# Patient Record
Sex: MALE | Race: WHITE | NOT HISPANIC OR LATINO | Employment: OTHER | URBAN - METROPOLITAN AREA
[De-identification: names, ages, dates, MRNs, and addresses within clinical notes are randomized per-mention and may not be internally consistent; named-entity substitution may affect disease eponyms.]

---

## 2017-01-04 ENCOUNTER — OFFICE VISIT (OUTPATIENT)
Dept: OTHER | Facility: IMAGING CENTER | Age: 77
End: 2017-01-04
Payer: MEDICARE

## 2017-01-04 DIAGNOSIS — M19.032 PRIMARY OSTEOARTHRITIS OF BOTH WRISTS: ICD-10-CM

## 2017-01-04 DIAGNOSIS — M54.2 CERVICALGIA: ICD-10-CM

## 2017-01-04 DIAGNOSIS — M19.031 PRIMARY OSTEOARTHRITIS OF BOTH WRISTS: ICD-10-CM

## 2017-01-04 DIAGNOSIS — G60.9 PERIPHERAL NEUROPATHY, IDIOPATHIC: Primary | ICD-10-CM

## 2017-01-04 PROCEDURE — 99213 OFFICE O/P EST LOW 20 MIN: CPT | Mod: 25 | Performed by: FAMILY MEDICINE

## 2017-01-04 PROCEDURE — 97813 ACUP 1/> W/ESTIM 1ST 15 MIN: CPT | Performed by: FAMILY MEDICINE

## 2017-01-04 PROCEDURE — 97811 ACUP 1/> W/O ESTIM EA ADD 15: CPT | Performed by: FAMILY MEDICINE

## 2017-01-04 NOTE — PROGRESS NOTES
[facility] Medical Acupuncture Progress Note  [address] [place] [place] [address]  Dept: [phone]      Patient Name: Primo Ca   MRN: [id number]  YOB: 1940  PCP: Jefferson Garcia M.D.  Date of Service: 1/4/2017  9:59 AM    CC Alvarado - SANDY  peripheral neuropathy, OA wrists bilaterally (lateral aspect), cervicalgia     HPI He needs 5 teeth recrowned - he's going to [place] at the end of this month.    Still has neuropathy to the feet.  Last treatment was helpful for his feet.  He states that the neuropathy hasn't gotten worse in a year.  The wrists aren't too bad at this point.  He has a bit of pain to the R lateral wrist.      Neck is tight bilaterally.       ROS walks an hour 4x per week-   Seen by chiropractic in the past - states that this didn't help.   Favorite color - Red - Dark.  Just likes it. Wears brown and light colored clothes.    Favorite Season - likes the color of the fall .  Too hot in the summer.  90s is too hot.    For hobby / fun - travel.  In the states and overseas.  Likes to meet people, but really likes the history. Genealogy.   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    Retired at 55.    Didn't like the management.    4 years with the [employer].    Born in [place], [place] ? Early in the 2 AM.  Unknown.   R handed   PMH Past Medical History   Diagnosis Date   • Hypertension    • Dyslipidemia    • Gout    • Vertigo    • Obesity    • Diabetes    • GERD (gastroesophageal reflux disease)      Past Surgical History   Procedure Laterality Date   • Cholecystectomy         Social History     Social History   • Marital Status:      Spouse Name: Evelyn   • Number of Children: N/A   • Years of Education: N/A     Social History Main Topics   • Smoking status: Former Smoker -- 22 years     Types: Pipe     Start date: 04/01/1975     Quit date: 08/02/1981   • Smokeless tobacco: Never Used   • Alcohol Use: No   • Drug Use: No   • Sexual  Activity: No      Comment: , 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     • ONE TOUCH ULTRA TEST strip TEST ONCE DAILY AND AS NEEDED FOR SYMPTOMS OF HIGH OR LOW SUGAR 100 Strip 3   • hydrochlorothiazide (HYDRODIURIL) 25 MG Tab TAKE 1 TABLET BY MOUTH EVERY DAY 90 Tab 3   • lisinopril (PRINIVIL) 20 MG Tab TAKE 1 TABLET BY MOUTH TWICE DAILY 180 Tab 3   • POTASSIUM CHLORIDE PO Take  by mouth.     • allopurinol (ZYLOPRIM) 300 MG TABS Take 1 Tab by mouth every day.     • Alpha-Lipoic Acid 600 MG CAPS Take 1 Cap by mouth every day.     • Blood Glucose Monitoring Suppl SUPPLIES MISC Test strips order: Test strips for One Touch Ultra meter. Sig: use once daily and prn ssx high or low sugar 100 Each 3   • cyanocobalamin (VITAMIN B-12) 500 MCG TABS Take 500 mcg by mouth every day.     • docosahexanoic acid (FISH OIL) 1000 MG CAPS Take 1,000 mg by mouth 3 times a day, with meals.     • vitamin D (CHOLECALCIFEROL) 1000 UNIT TABS Take 1,000 Units by mouth 3 times a day.       No current facility-administered medications on file prior to visit.      ALLERGIES Allergies   Allergen Reactions   • Spironolactone      diarrhea      PE Pulses - not examined today  Tongue - not examined today     Assesment Eastern Stagnation SI, Yin Qi  BaZi Saavedra Water (Ángel), strength indeterminate (strong per preferences) need time of birth    Western Encounter Diagnoses   Name Primary?   •  Peripheral neuropathy, idiopathic Yes   • Primary osteoarthritis of both wrists    • Cervicalgia                   Plan Treatment matrix = ZULEIKA CRAIG  Set 1: BLE SP6-->++SP3 / LR5-->++LR interspace / KI8-->++KI1 (L KI no estim)  Set 2: Bilateral FMS  Set 3: SANDY Quinn  Set 4: RUE SI7-->SI5  Set 5: RLE Yin ankle x 4   15 min spent face to face, not including procedure  Kal Huizar M.D.

## 2017-01-04 NOTE — MR AVS SNAPSHOT
Primo Gambino Lanny   2017 10:00 AM   Office Visit   MRN: [id number]    Department:  Acupuncture Med   Dept Phone:  [phone]    Description:  Male : 1940   Provider:  Kal Huizar M.D.           Allergies as of 2017     Allergen Noted Reactions    Spironolactone 2016       diarrhea      You were diagnosed with     Peripheral neuropathy, idiopathic   [241263]  -  Primary     Primary osteoarthritis of both wrists   [8057977]       Cervicalgia   [723.1.ICD-9-CM]         Vital Signs     Smoking Status                   Former Smoker           Basic Information     Date Of Birth Sex Race Ethnicity Preferred Language    1940 Male White Non- English      Problem List              ICD-10-CM Priority Class Noted - Resolved    Type 2 diabetes, controlled, with neuropathy (HCC) E11.40   10/19/2009 - Present    HTN (hypertension) I10   10/19/2009 - Present    Dyslipidemia E78.5   10/19/2009 - Present    Chronic gout M1A.9XX0   10/19/2009 - Present    GERD (gastroesophageal reflux disease) K21.9   10/19/2009 - Present    Obesity (BMI 35.0-39.9 without comorbidity) (HCC) (Chronic) E66.9   Unknown - Present    Leg pain M79.606   2013 - Present    Seasonal allergies J30.2   2/10/2014 - Present    Cerebral microvascular disease I67.9   3/4/2014 - Present    Diabetic neuropathy, type II diabetes mellitus (HCC) E11.40   3/20/2014 - Present    Varicose veins of legs I83.93   3/20/2014 - Present    Actinic keratosis L57.0   3/20/2014 - Present    History of squamous cell carcinoma of skin Z85.828   3/20/2014 - Present    Diverticulosis of colon K57.30   2014 - Present    Neck pain on left side M54.2   10/30/2014 - Present    BPH (benign prostatic hyperplasia) N40.0   2015 - Present    ALEX on CPAP G47.33   2015 - Present    Cervical radiculopathy M54.12   10/8/2015 - Present    Peripheral neuropathy, idiopathic G60.9   10/8/2015 - Present    Primary osteoarthritis of both  wrists M19.031, M19.032   3/1/2016 - Present    Absolute anemia D64.9   3/15/2016 - Present    Obesity (BMI 30-39.9) E66.9   9/22/2016 - Present      Health Maintenance        Date Due Completion Dates    IMM DTaP/Tdap/Td Vaccine (1 - Tdap) 8/27/1959 ---    RETINAL SCREENING 4/22/2016 4/22/2015, 11/24/2014    IMM INFLUENZA (1) 9/1/2016 10/19/2009, 10/25/2007    A1C SCREENING 3/16/2017 9/16/2016, 2/29/2016, 10/19/2015, 3/17/2015, 10/23/2014, 2/3/2014, 11/4/2013, 5/24/2013, 10/15/2012, 4/5/2012, 11/14/2011    FASTING LIPID PROFILE 9/16/2017 9/16/2016, 2/29/2016, 10/19/2015, 3/17/2015, 10/23/2014, 3/5/2014, 11/4/2013, 10/15/2012, 4/5/2012, 2/11/2011, 10/12/2009    URINE ACR / MICROALBUMIN 9/16/2017 9/16/2016, 2/29/2016, 10/19/2015, 3/17/2015, 10/23/2014, 2/3/2014, 4/5/2012    SERUM CREATININE 9/16/2017 9/16/2016, 5/9/2016, 4/11/2016, 2/29/2016, 11/26/2015, 10/19/2015, 4/28/2015, 4/26/2015, 3/17/2015, 10/23/2014, 3/4/2014, 2/3/2014, 1/15/2013, 12/9/2011, 2/11/2011, 10/12/2009    IMM PNEUMOCOCCAL 65+ (ADULT) LOW/MEDIUM RISK SERIES (2 of 2 - PPSV23) 9/22/2017 9/22/2016    DIABETES MONOFILAMTENT / LE EXAM 9/22/2017 9/22/2016, 4/2/2015, 3/20/2014    COLONOSCOPY 8/19/2024 8/19/2014            Current Immunizations     13-VALENT PCV PREVNAR 9/22/2016    INFLUENZA VACCINE H1N1 10/1/2013    Influenza TIV (IM) 10/19/2009    Influenza Vaccine Pediatric 10/25/2007    Pneumococcal Vaccine (UF)Historical Data 1/1/2010    SHINGLES VACCINE 10/19/2009      Below and/or attached are the medications your provider expects you to take. Review all of your home medications and newly ordered medications with your provider and/or pharmacist. Follow medication instructions as directed by your provider and/or pharmacist. Please keep your medication list with you and share with your provider. Update the information when medications are discontinued, doses are changed, or new medications (including over-the-counter products) are added; and carry  medication information at all times in the event of emergency situations     Allergies:  SPIRONOLACTONE - (reactions not documented)               Medications  Valid as of: January 04, 2017 - 11:08 AM    Generic Name Brand Name Tablet Size Instructions for use    Allopurinol (Tab) ZYLOPRIM 300 MG Take 1 Tab by mouth every day.        Alpha-Lipoic Acid (Cap) Alpha-Lipoic Acid 600 MG Take 1 Cap by mouth every day.        Atenolol (Tab) TENORMIN 50 MG TAKE 1 TABLET BY MOUTH TWICE DAILY        Blood Glucose Monitoring Suppl (Misc) Blood Glucose Monitoring Suppl SUPPLIES Test strips order: Test strips for One Touch Ultra meter. Sig: use once daily and prn ssx high or low sugar        Cholecalciferol (Tab) cholecalciferol 1000 UNIT Take 1,000 Units by mouth 3 times a day.        Cyanocobalamin (Tab) VITAMIN B-12 500 MCG Take 500 mcg by mouth every day.        DiltiaZEM HCl Coated Beads (CAPSULE SR 24 HR) CARTIA  MG TAKE ONE CAPSULE BY MOUTH EVERY DAY        Glucose Blood (Strip) ONE TOUCH ULTRA TEST  TEST ONCE DAILY AND AS NEEDED FOR SYMPTOMS OF HIGH OR LOW SUGAR        HydroCHLOROthiazide (Tab) HYDRODIURIL 25 MG TAKE 1 TABLET BY MOUTH EVERY DAY        Indomethacin (Cap) INDOCIN 50 MG Take 50 mg by mouth 3 times a day.        Lisinopril (Tab) PRINIVIL 20 MG TAKE 1 TABLET BY MOUTH TWICE DAILY        Lovastatin (Tab) MEVACOR 20 MG TAKE 3 TABLETS BY MOUTH EVERY DAY        Omega-3 Fatty Acids (Cap) OMEGA 3 FA 1000 MG Take 1,000 mg by mouth 3 times a day, with meals.        Potassium Chloride   Take  by mouth.        SITagliptin-MetFORMIN HCl (Tab) JANUMET  MG TAKE 1 TABLET TWICE DAILY  WITH MEALS        .                 Medicines prescribed today were sent to:     [id number] - [place], [place] - [address] AT [facility] [address] [facility]    [address] [address]    Phone: [phone] Fax: [phone]    Open 24 Hours?: No    [facility] -  [facility] [place] - [address] AT [address]AL TO [address] [facility]    [address] [facility] [address]    Phone: [phone] Fax: [phone]    Open 24 Hours?: No    [id number] - [place], [place] - [address] AT [facility] [address]    [address] [facility] [address]    Phone: [phone] Fax: [phone]    Open 24 Hours?: No      Medication refill instructions:       If your prescription bottle indicates you have medication refills left, it is not necessary to call your provider’s office. Please contact your pharmacy and they will refill your medication.    If your prescription bottle indicates you do not have any refills left, you may request refills at any time through one of the following ways: The online [employer] system (except Urgent Care), by calling your provider’s office, or by asking your pharmacy to contact your provider’s office with a refill request. Medication refills are processed only during regular business hours and may not be available until the next business day. Your provider may request additional information or to have a follow-up visit with you prior to refilling your medication.   *Please Note: Medication refills are assigned a new Rx number when refilled electronically. Your pharmacy may indicate that no refills were authorized even though a new prescription for the same medication is available at the pharmacy. Please request the medicine by name with the pharmacy before contacting your provider for a refill.        Instructions    The side effects of Acupuncture needle insertion include: minor bruising, bleeding, or pain at the site of needle insertion.  If more worrisome symptoms, such as continued bleeding, severe bruising, or continue pain or altered sensation persist, please contact [facility] office @ [phone]            [employer] Access Code: Activation code not generated  Current [employer] Status: Active

## 2017-01-04 NOTE — PATIENT INSTRUCTIONS
The side effects of Acupuncture needle insertion include: minor bruising, bleeding, or pain at the site of needle insertion.  If more worrisome symptoms, such as continued bleeding, severe bruising, or continue pain or altered sensation persist, please contact [facility] office @ [phone]

## 2017-01-25 ENCOUNTER — OFFICE VISIT (OUTPATIENT)
Dept: OTHER | Facility: IMAGING CENTER | Age: 77
End: 2017-01-25
Payer: MEDICARE

## 2017-01-25 DIAGNOSIS — G60.9 PERIPHERAL NEUROPATHY, IDIOPATHIC: ICD-10-CM

## 2017-01-25 DIAGNOSIS — M19.032 PRIMARY OSTEOARTHRITIS OF BOTH WRISTS: ICD-10-CM

## 2017-01-25 DIAGNOSIS — M54.12 CERVICAL RADICULOPATHY: Primary | ICD-10-CM

## 2017-01-25 DIAGNOSIS — M19.031 PRIMARY OSTEOARTHRITIS OF BOTH WRISTS: ICD-10-CM

## 2017-01-25 PROCEDURE — 99213 OFFICE O/P EST LOW 20 MIN: CPT | Mod: 25 | Performed by: FAMILY MEDICINE

## 2017-01-25 PROCEDURE — 1101F PT FALLS ASSESS-DOCD LE1/YR: CPT | Performed by: FAMILY MEDICINE

## 2017-01-25 PROCEDURE — G8432 DEP SCR NOT DOC, RNG: HCPCS | Performed by: FAMILY MEDICINE

## 2017-01-25 PROCEDURE — 97811 ACUP 1/> W/O ESTIM EA ADD 15: CPT | Performed by: FAMILY MEDICINE

## 2017-01-25 PROCEDURE — G8484 FLU IMMUNIZE NO ADMIN: HCPCS | Performed by: FAMILY MEDICINE

## 2017-01-25 PROCEDURE — 1036F TOBACCO NON-USER: CPT | Performed by: FAMILY MEDICINE

## 2017-01-25 PROCEDURE — G8419 CALC BMI OUT NRM PARAM NOF/U: HCPCS | Performed by: FAMILY MEDICINE

## 2017-01-25 PROCEDURE — 4040F PNEUMOC VAC/ADMIN/RCVD: CPT | Performed by: FAMILY MEDICINE

## 2017-01-25 PROCEDURE — 97810 ACUP 1/> WO ESTIM 1ST 15 MIN: CPT | Performed by: FAMILY MEDICINE

## 2017-01-25 NOTE — PROGRESS NOTES
[facility] Medical Acupuncture Progress Note  [address] [place] [address]  Dept: [phone]      Patient Name: Primo Ca   MRN: [id number]  YOB: 1940  PCP: Jefferson Garcia M.D.  Date of Service: 1/25/2017 10:07 AM    CC Alvarado - SANDY  peripheral neuropathy, OA wrists bilaterally (lateral aspect), cervicalgia     HPI He has tingling to the top of the L shoulder.  He states that it's a bit annoying.  Hot shower helps to relieve it. He's a bit tight in the shoulders as well.         R wrist is bothering him.  He's been shoveling snow.      SANDY peripheral neuropathy.    He's ready to go to [place] to get his teeth worked on   [employer] walks an hour 4x per week-   Seen by chiropractic in the past - states that this didn't help.   Favorite color - Red - Dark.  Just likes it. Wears brown and light colored clothes.    Favorite Season - likes the color of the fall .  Too hot in the summer.  90s is too hot.    For hobby / fun - travel.  In the states and overseas.  Likes to meet people, but really likes the history. Genealogy.   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    Retired at 55.    Didn't like the management.    4 years with the [employer].    Used to work in [place], [place]  Born in [place], [place] ? Early in the 2 AM.  Unknown.   R handed   PMH Past Medical History   Diagnosis Date   • Hypertension    • Dyslipidemia    • Gout    • Vertigo    • Obesity    • Diabetes    • GERD (gastroesophageal reflux disease)      Past Surgical History   Procedure Laterality Date   • Cholecystectomy         Social History     Social History   • Marital Status:      Spouse Name: Evelyn   • Number of Children: N/A   • Years of Education: N/A     Social History Main Topics   • Smoking status: Former Smoker -- 22 years     Types: Pipe     Start date: 04/01/1975     Quit date: 08/02/1981   • Smokeless tobacco: Never Used   • Alcohol Use: No   • Drug Use: No   • Sexual  Activity: No      Comment: , 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     • ONE TOUCH ULTRA TEST strip TEST ONCE DAILY AND AS NEEDED FOR SYMPTOMS OF HIGH OR LOW SUGAR 100 Strip 3   • hydrochlorothiazide (HYDRODIURIL) 25 MG Tab TAKE 1 TABLET BY MOUTH EVERY DAY 90 Tab 3   • lisinopril (PRINIVIL) 20 MG Tab TAKE 1 TABLET BY MOUTH TWICE DAILY 180 Tab 3   • POTASSIUM CHLORIDE PO Take  by mouth.     • allopurinol (ZYLOPRIM) 300 MG TABS Take 1 Tab by mouth every day.     • Alpha-Lipoic Acid 600 MG CAPS Take 1 Cap by mouth every day.     • Blood Glucose Monitoring Suppl SUPPLIES MISC Test strips order: Test strips for One Touch Ultra meter. Sig: use once daily and prn ssx high or low sugar 100 Each 3   • cyanocobalamin (VITAMIN B-12) 500 MCG TABS Take 500 mcg by mouth every day.     • docosahexanoic acid (FISH OIL) 1000 MG CAPS Take 1,000 mg by mouth 3 times a day, with meals.     • vitamin D (CHOLECALCIFEROL) 1000 UNIT TABS Take 1,000 Units by mouth 3 times a day.       No current facility-administered medications on file prior to visit.      ALLERGIES Allergies   Allergen Reactions   • Spironolactone      diarrhea      PE Pulses - not examined today  Tongue - not examined today     Assesment Eastern Stagnation SI, Yin Qi  BaZi Saavedra Water (Ángel), strength indeterminate (strong per preferences) need time of birth    Western Encounter Diagnoses   Name Primary?   •  Cervical radiculopathy Yes   • Primary osteoarthritis of both wrists    • Peripheral neuropathy, idiopathic                   Plan Treatment matrix = Alternating  Set 1: neuropathy treatment - RLE Yin   Set 2: Bilateral FMS  Set 3: SANDY Ruy Quinn  Set 4: LLE Saavedra ankle x 4, GB20,21  Set 5: RLE Yin ankle x 4   15 min spent face to face, not including procedure  Kal Huizar M.D.

## 2017-01-25 NOTE — MR AVS SNAPSHOT
Primo Gambino Lanny   2017 10:00 AM   Office Visit   MRN: [id number]    Department:  Acupuncture Med   Dept Phone:  [phone]    Description:  Male : 1940   Provider:  Kal Huizar M.D.           Allergies as of 2017     Allergen Noted Reactions    Spironolactone 2016       diarrhea      You were diagnosed with     Cervical radiculopathy   [657445]  -  Primary     Primary osteoarthritis of both wrists   [7151389]       Peripheral neuropathy, idiopathic   [746013]         Vital Signs     Smoking Status                   Former Smoker           Basic Information     Date Of Birth Sex Race Ethnicity Preferred Language    1940 Male White Non- English      Your appointments     2017 10:00 AM   ACUPUNCTURE 30 with Kal Huizar M.D.   [facility] Acupuncture and Integrative Medicine (--)    [address] [facility] [id number] [phone]              Problem List              ICD-10-CM Priority Class Noted - Resolved    Type 2 diabetes, controlled, with neuropathy (CMS-[facility]) E11.40   10/19/2009 - Present    HTN (hypertension) I10   10/19/2009 - Present    Dyslipidemia E78.5   10/19/2009 - Present    Chronic gout M1A.9XX0   10/19/2009 - Present    GERD (gastroesophageal reflux disease) K21.9   10/19/2009 - Present    Obesity (BMI 35.0-39.9 without comorbidity) ([facility]) (Chronic) E66.9   Unknown - Present    Leg pain M79.606   2013 - Present    Seasonal allergies J30.2   2/10/2014 - Present    Cerebral microvascular disease I67.9   3/4/2014 - Present    Diabetic neuropathy, type II diabetes mellitus (CMS-[facility]) E11.40   3/20/2014 - Present    Varicose veins of legs I83.93   3/20/2014 - Present    Actinic keratosis L57.0   3/20/2014 - Present    History of squamous cell carcinoma of skin Z85.828   3/20/2014 - Present    Diverticulosis of colon K57.30   2014 - Present    Neck pain on left side M54.2   10/30/2014 - Present    BPH (benign prostatic  hyperplasia) N40.0   4/2/2015 - Present    ALEX on CPAP G47.33   4/29/2015 - Present    Cervical radiculopathy M54.12   10/8/2015 - Present    Peripheral neuropathy, idiopathic G60.9   10/8/2015 - Present    Primary osteoarthritis of both wrists M19.031, M19.032   3/1/2016 - Present    Absolute anemia D64.9   3/15/2016 - Present    Obesity (BMI 30-39.9) E66.9   9/22/2016 - Present      Health Maintenance        Date Due Completion Dates    IMM DTaP/Tdap/Td Vaccine (1 - Tdap) 8/27/1959 ---    RETINAL SCREENING 4/22/2016 4/22/2015, 11/24/2014    IMM INFLUENZA (1) 9/1/2016 10/19/2009, 10/25/2007    A1C SCREENING 3/16/2017 9/16/2016, 2/29/2016, 10/19/2015, 3/17/2015, 10/23/2014, 2/3/2014, 11/4/2013, 5/24/2013, 10/15/2012, 4/5/2012, 11/14/2011    FASTING LIPID PROFILE 9/16/2017 9/16/2016, 2/29/2016, 10/19/2015, 3/17/2015, 10/23/2014, 3/5/2014, 11/4/2013, 10/15/2012, 4/5/2012, 2/11/2011, 10/12/2009    URINE ACR / MICROALBUMIN 9/16/2017 9/16/2016, 2/29/2016, 10/19/2015, 3/17/2015, 10/23/2014, 2/3/2014, 4/5/2012    SERUM CREATININE 9/16/2017 9/16/2016, 5/9/2016, 4/11/2016, 2/29/2016, 11/26/2015, 10/19/2015, 4/28/2015, 4/26/2015, 3/17/2015, 10/23/2014, 3/4/2014, 2/3/2014, 1/15/2013, 12/9/2011, 2/11/2011, 10/12/2009    IMM PNEUMOCOCCAL 65+ (ADULT) LOW/MEDIUM RISK SERIES (2 of 2 - PPSV23) 9/22/2017 9/22/2016    DIABETES MONOFILAMENT / LE EXAM 9/22/2017 9/22/2016, 4/2/2015, 3/20/2014    COLONOSCOPY 8/19/2024 8/19/2014            Current Immunizations     13-VALENT PCV PREVNAR 9/22/2016    INFLUENZA VACCINE H1N1 10/1/2013    Influenza TIV (IM) 10/19/2009    Influenza Vaccine Pediatric 10/25/2007    Pneumococcal Vaccine (UF)Historical Data 1/1/2010    SHINGLES VACCINE 10/19/2009      Below and/or attached are the medications your provider expects you to take. Review all of your home medications and newly ordered medications with your provider and/or pharmacist. Follow medication instructions as directed by your provider and/or  pharmacist. Please keep your medication list with you and share with your provider. Update the information when medications are discontinued, doses are changed, or new medications (including over-the-counter products) are added; and carry medication information at all times in the event of emergency situations     Allergies:  SPIRONOLACTONE - (reactions not documented)               Medications  Valid as of: January 25, 2017 - 11:16 AM    Generic Name Brand Name Tablet Size Instructions for use    Allopurinol (Tab) ZYLOPRIM 300 MG Take 1 Tab by mouth every day.        Alpha-Lipoic Acid (Cap) Alpha-Lipoic Acid 600 MG Take 1 Cap by mouth every day.        Atenolol (Tab) TENORMIN 50 MG TAKE 1 TABLET BY MOUTH TWICE DAILY        Blood Glucose Monitoring Suppl (Misc) Blood Glucose Monitoring Suppl SUPPLIES Test strips order: Test strips for One Touch Ultra meter. Sig: use once daily and prn ssx high or low sugar        Cholecalciferol (Tab) cholecalciferol 1000 UNIT Take 1,000 Units by mouth 3 times a day.        Cyanocobalamin (Tab) VITAMIN B-12 500 MCG Take 500 mcg by mouth every day.        DiltiaZEM HCl Coated Beads (CAPSULE SR 24 HR) CARTIA  MG TAKE ONE CAPSULE BY MOUTH EVERY DAY        Glucose Blood (Strip) ONE TOUCH ULTRA TEST  TEST ONCE DAILY AND AS NEEDED FOR SYMPTOMS OF HIGH OR LOW SUGAR        HydroCHLOROthiazide (Tab) HYDRODIURIL 25 MG TAKE 1 TABLET BY MOUTH EVERY DAY        Indomethacin (Cap) INDOCIN 50 MG Take 50 mg by mouth 3 times a day.        Lisinopril (Tab) PRINIVIL 20 MG TAKE 1 TABLET BY MOUTH TWICE DAILY        Lovastatin (Tab) MEVACOR 20 MG TAKE 3 TABLETS BY MOUTH EVERY DAY        Omega-3 Fatty Acids (Cap) OMEGA 3 FA 1000 MG Take 1,000 mg by mouth 3 times a day, with meals.        Potassium Chloride   Take  by mouth.        SITagliptin-MetFORMIN HCl (Tab) JANUMET  MG TAKE 1 TABLET TWICE DAILY  WITH MEALS        .                 Medicines prescribed today were sent to:     [id number] - [place], [place] - [address]  AT [facility] & [facility]    [address] [facility] [address] [place] [address]    Phone: [phone] Fax: [phone]    Open 24 Hours?: No    [facility] [place], [place] - [address] AT PORTAL TO REGISTERED [facility]    [address] [facility] [place] [address]    Phone: [phone] Fax: [phone]    Open 24 Hours?: No    [id number] - [place], [place] - [address] AT [facility] & [address]    [address] [facility] [address]    Phone: [phone] Fax: [phone]    Open 24 Hours?: No      Medication refill instructions:       If your prescription bottle indicates you have medication refills left, it is not necessary to call your provider’s office. Please contact your pharmacy and they will refill your medication.    If your prescription bottle indicates you do not have any refills left, you may request refills at any time through one of the following ways: The online [employer] system (except Urgent Care), by calling your provider’s office, or by asking your pharmacy to contact your provider’s office with a refill request. Medication refills are processed only during regular business hours and may not be available until the next business day. Your provider may request additional information or to have a follow-up visit with you prior to refilling your medication.   *Please Note: Medication refills are assigned a new Rx number when refilled electronically. Your pharmacy may indicate that no refills were authorized even though a new prescription for the same medication is available at the pharmacy. Please request the medicine by name with the pharmacy before contacting your provider for a refill.        Instructions    The side effects of Acupuncture needle insertion include: minor bruising, bleeding, or pain at the site of needle insertion.  If more worrisome symptoms, such as continued bleeding, severe bruising,  or continue pain or altered sensation persist, please contact [facility] office @ [phone]              [employer] Access Code: Activation code not generated  Current [employer] Status: Active

## 2017-01-25 NOTE — PATIENT INSTRUCTIONS
The side effects of Acupuncture needle insertion include: minor bruising, bleeding, or pain at the site of needle insertion.  If more worrisome symptoms, such as continued bleeding, severe bruising, or continue pain or altered sensation persist, please contact [facility] office @ [phone]

## 2017-02-09 RX ORDER — INDOMETHACIN 50 MG/1
CAPSULE ORAL
Qty: 90 CAP | Refills: 3 | Status: SHIPPED | OUTPATIENT
Start: 2017-02-09 | End: 2017-03-08

## 2017-02-09 NOTE — TELEPHONE ENCOUNTER
Was the patient seen in the last year in this department? Yes     Does patient have an active prescription for medications requested? No     Received Request Via: Pharmacy       Last visit:9/22/16    Last labs:9/22/16

## 2017-02-28 ENCOUNTER — OFFICE VISIT (OUTPATIENT)
Dept: OTHER | Facility: IMAGING CENTER | Age: 77
End: 2017-02-28
Payer: MEDICARE

## 2017-02-28 DIAGNOSIS — M19.031 PRIMARY OSTEOARTHRITIS OF BOTH WRISTS: ICD-10-CM

## 2017-02-28 DIAGNOSIS — M54.12 CERVICAL RADICULOPATHY: ICD-10-CM

## 2017-02-28 DIAGNOSIS — M19.032 PRIMARY OSTEOARTHRITIS OF BOTH WRISTS: ICD-10-CM

## 2017-02-28 DIAGNOSIS — G60.9 PERIPHERAL NEUROPATHY, IDIOPATHIC: Primary | ICD-10-CM

## 2017-02-28 PROCEDURE — 1036F TOBACCO NON-USER: CPT | Performed by: FAMILY MEDICINE

## 2017-02-28 PROCEDURE — G8432 DEP SCR NOT DOC, RNG: HCPCS | Performed by: FAMILY MEDICINE

## 2017-02-28 PROCEDURE — 97810 ACUP 1/> WO ESTIM 1ST 15 MIN: CPT | Performed by: FAMILY MEDICINE

## 2017-02-28 PROCEDURE — 99213 OFFICE O/P EST LOW 20 MIN: CPT | Mod: 25 | Performed by: FAMILY MEDICINE

## 2017-02-28 PROCEDURE — G8484 FLU IMMUNIZE NO ADMIN: HCPCS | Performed by: FAMILY MEDICINE

## 2017-02-28 PROCEDURE — 1101F PT FALLS ASSESS-DOCD LE1/YR: CPT | Performed by: FAMILY MEDICINE

## 2017-02-28 PROCEDURE — 97811 ACUP 1/> W/O ESTIM EA ADD 15: CPT | Performed by: FAMILY MEDICINE

## 2017-02-28 PROCEDURE — G8419 CALC BMI OUT NRM PARAM NOF/U: HCPCS | Performed by: FAMILY MEDICINE

## 2017-02-28 PROCEDURE — 4040F PNEUMOC VAC/ADMIN/RCVD: CPT | Performed by: FAMILY MEDICINE

## 2017-02-28 NOTE — PATIENT INSTRUCTIONS
The side effects of Acupuncture needle insertion include: minor bruising, bleeding, or pain at the site of needle insertion.  If more worrisome symptoms, such as continued bleeding, severe bruising, or continue pain or altered sensation persist, please contact [facility] office @ [phone]

## 2017-02-28 NOTE — PROGRESS NOTES
[facility] Progress Note  [address] [place] [address]  Dept: [phone]      Patient Name: Primo Ca   MRN: [id number]  YOB: 1940  PCP: Jefferson Garcia M.D.  Date of Service: 2/28/2017 10:05 AM    CC Alvarado - SANDY  peripheral neuropathy, OA wrists bilaterally (lateral aspect), shoulder pain L      HPI He's back from [place] after having his teeth worked on.  He states that the experience was wonderful.  He saved several thousand dollars.  Over 3 days, he had 3 root canals and multiple crowns.   Evelyn got her teeth cleaned.      L anterior shoulder is tingling.  No pain to the posterior neck and thoracic spine.  Neuropathy to feet.    Wrists are painful as usual.     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    ROS walks an hour 4x per week-   Seen by chiropractic in the past - states that this didn't help.   Favorite color - Red - Dark.  Just likes it. Wears brown and light colored clothes.    Favorite Season - likes the color of the fall .  Too hot in the summer.  90s is too hot.    For hobby / fun - travel.  In the states and overseas.  Likes to meet people, but really likes the history. Genealogy.   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    Retired at 55.    Didn't like the management.    4 years with the [employer].    Used to work in [place], [place]  Born in [place], [place] ? Early in the 2 AM.  Unknown.   R handed   PMH Past Medical History   Diagnosis Date   • Hypertension    • Dyslipidemia    • Gout    • Vertigo    • Obesity    • Diabetes    • GERD (gastroesophageal reflux disease)      Past Surgical History   Procedure Laterality Date   • Cholecystectomy        SH Social History     Social History   • Marital Status:      Spouse Name: Evelyn   • Number of Children: N/A   • Years of Education: N/A     Social History Main Topics   • Smoking status: Former Smoker -- 22 years     Types: Pipe     Start date:  04/01/1975     Quit date: 08/02/1981   • Smokeless tobacco: Never Used   • Alcohol Use: No   • Drug Use: No   • Sexual Activity: No      Comment: , 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, retired government official     Other Topics Concern   • None     Social History Narrative      MEDS Current Outpatient Prescriptions on File Prior to Visit   Medication Sig Dispense Refill   • indomethacin (INDOCIN) 50 MG Cap TAKE 1 CAPSULE BY MOUTH THREE TIMES DAILY 90 Cap 3   • JANUMET  MG per tablet TAKE 1 TABLET TWICE DAILY  WITH MEALS 180 Tab 1   • atenolol (TENORMIN) 50 MG Tab TAKE 1 TABLET BY MOUTH TWICE DAILY 180 Tab 3   • CARTIA  MG CAPSULE SR 24 HR TAKE ONE CAPSULE BY MOUTH EVERY DAY 90 Cap 3   • lovastatin (MEVACOR) 20 MG Tab TAKE 3 TABLETS BY MOUTH EVERY  Tab 3   • indomethacin (INDOCIN) 50 MG Cap Take 50 mg by mouth 3 times a day.     • ONE TOUCH ULTRA TEST strip TEST ONCE DAILY AND AS NEEDED FOR SYMPTOMS OF HIGH OR LOW SUGAR 100 Strip 3   • hydrochlorothiazide (HYDRODIURIL) 25 MG Tab TAKE 1 TABLET BY MOUTH EVERY DAY 90 Tab 3   • lisinopril (PRINIVIL) 20 MG Tab TAKE 1 TABLET BY MOUTH TWICE DAILY 180 Tab 3   • POTASSIUM CHLORIDE PO Take  by mouth.     • allopurinol (ZYLOPRIM) 300 MG TABS Take 1 Tab by mouth every day.     • Alpha-Lipoic Acid 600 MG CAPS Take 1 Cap by mouth every day.     • Blood Glucose Monitoring Suppl SUPPLIES MISC Test strips order: Test strips for One Touch Ultra meter. Sig: use once daily and prn ssx high or low sugar 100 Each 3   • cyanocobalamin (VITAMIN B-12) 500 MCG TABS Take 500 mcg by mouth every day.     • docosahexanoic acid (FISH OIL) 1000 MG CAPS Take 1,000 mg by mouth 3 times a day, with meals.     • vitamin D (CHOLECALCIFEROL) 1000 UNIT TABS Take 1,000 Units by mouth 3 times a day.       No current facility-administered medications on file prior to visit.      ALLERGIES Allergies   Allergen Reactions   • Spironolactone      diarrhea      PE Pulses - not examined today  Tongue - not  examined today     Assesment Eastern Stagnation SI, Yin Qi  BaZi [place] Water (Ángel), strength indeterminate (strong per preferences) need time of birth    Western Encounter Diagnoses   Name Primary?   • Peripheral neuropathy, idiopathic Yes   • Primary osteoarthritis of both wrists    • Cervical radiculopathy                   Plan Treatment matrix = Alternating  Set 1: neuropathy treatment - RLE Yin, No estim  Set 2: Bilateral FMS  Set 3: SANDY Quinn  Set 4: LUE LU7-->LU2, HT5-->HT7  Set 5: RUE SI7-->SI5   15 min spent face to face, not including procedure  Kal Huizar M.D.

## 2017-02-28 NOTE — MR AVS SNAPSHOT
Primo Gambino Lanny   2017 10:00 AM   Office Visit   MRN: [id number]    Department:  Acupuncture Med   Dept Phone:  [phone]    Description:  Male : 1940   Provider:  Kal Huizar M.D.           Allergies as of 2017     Allergen Noted Reactions    Spironolactone 2016       diarrhea      You were diagnosed with     Peripheral neuropathy, idiopathic   [341146]  -  Primary     Primary osteoarthritis of both wrists   [9022837]       Cervical radiculopathy   [894876]         Vital Signs     Smoking Status                   Former Smoker           Basic Information     Date Of Birth Sex Race Ethnicity Preferred Language    1940 Male White Non- English      Problem List              ICD-10-CM Priority Class Noted - Resolved    Type 2 diabetes, controlled, with neuropathy (CMS-HCC) E11.40   10/19/2009 - Present    HTN (hypertension) I10   10/19/2009 - Present    Dyslipidemia E78.5   10/19/2009 - Present    Chronic gout M1A.9XX0   10/19/2009 - Present    GERD (gastroesophageal reflux disease) K21.9   10/19/2009 - Present    Obesity (BMI 35.0-39.9 without comorbidity) ([facility]) (Chronic) E66.9   Unknown - Present    Leg pain M79.606   2013 - Present    Seasonal allergies J30.2   2/10/2014 - Present    Cerebral microvascular disease I67.9   3/4/2014 - Present    Diabetic neuropathy, type II diabetes mellitus (CMS-HCC) E11.40   3/20/2014 - Present    Varicose veins of legs I83.93   3/20/2014 - Present    Actinic keratosis L57.0   3/20/2014 - Present    History of squamous cell carcinoma of skin Z85.828   3/20/2014 - Present    Diverticulosis of colon K57.30   2014 - Present    Neck pain on left side M54.2   10/30/2014 - Present    BPH (benign prostatic hyperplasia) N40.0   2015 - Present    ALEX on CPAP G47.33   2015 - Present    Cervical radiculopathy M54.12   10/8/2015 - Present    Peripheral neuropathy, idiopathic G60.9   10/8/2015 - Present    Primary  osteoarthritis of both wrists M19.031, M19.032   3/1/2016 - Present    Absolute anemia D64.9   3/15/2016 - Present    Obesity (BMI 30-39.9) E66.9   9/22/2016 - Present      Health Maintenance        Date Due Completion Dates    IMM DTaP/Tdap/Td Vaccine (1 - Tdap) 8/27/1959 ---    RETINAL SCREENING 4/22/2016 4/22/2015, 11/24/2014    IMM INFLUENZA (1) 9/1/2016 10/19/2009, 10/25/2007    A1C SCREENING 3/16/2017 9/16/2016, 2/29/2016, 10/19/2015, 3/17/2015, 10/23/2014, 2/3/2014, 11/4/2013, 5/24/2013, 10/15/2012, 4/5/2012, 11/14/2011    FASTING LIPID PROFILE 9/16/2017 9/16/2016, 2/29/2016, 10/19/2015, 3/17/2015, 10/23/2014, 3/5/2014, 11/4/2013, 10/15/2012, 4/5/2012, 2/11/2011, 10/12/2009    URINE ACR / MICROALBUMIN 9/16/2017 9/16/2016, 2/29/2016, 10/19/2015, 3/17/2015, 10/23/2014, 2/3/2014, 4/5/2012    SERUM CREATININE 9/16/2017 9/16/2016, 5/9/2016, 4/11/2016, 2/29/2016, 11/26/2015, 10/19/2015, 4/28/2015, 4/26/2015, 3/17/2015, 10/23/2014, 3/4/2014, 2/3/2014, 1/15/2013, 12/9/2011, 2/11/2011, 10/12/2009    IMM PNEUMOCOCCAL 65+ (ADULT) LOW/MEDIUM RISK SERIES (2 of 2 - PPSV23) 9/22/2017 9/22/2016    DIABETES MONOFILAMENT / LE EXAM 9/22/2017 9/22/2016, 4/2/2015, 3/20/2014    COLONOSCOPY 8/19/2024 8/19/2014            Current Immunizations     13-VALENT PCV PREVNAR 9/22/2016    INFLUENZA VACCINE H1N1 10/1/2013    Influenza TIV (IM) 10/19/2009    Influenza Vaccine Pediatric 10/25/2007    Pneumococcal Vaccine (UF)Historical Data 1/1/2010    SHINGLES VACCINE 10/19/2009      Below and/or attached are the medications your provider expects you to take. Review all of your home medications and newly ordered medications with your provider and/or pharmacist. Follow medication instructions as directed by your provider and/or pharmacist. Please keep your medication list with you and share with your provider. Update the information when medications are discontinued, doses are changed, or new medications (including over-the-counter products)  are added; and carry medication information at all times in the event of emergency situations     Allergies:  SPIRONOLACTONE - (reactions not documented)               Medications  Valid as of: February 28, 2017 - 11:01 AM    Generic Name Brand Name Tablet Size Instructions for use    Allopurinol (Tab) ZYLOPRIM 300 MG Take 1 Tab by mouth every day.        Alpha-Lipoic Acid (Cap) Alpha-Lipoic Acid 600 MG Take 1 Cap by mouth every day.        Atenolol (Tab) TENORMIN 50 MG TAKE 1 TABLET BY MOUTH TWICE DAILY        Blood Glucose Monitoring Suppl (Misc) Blood Glucose Monitoring Suppl SUPPLIES Test strips order: Test strips for One Touch Ultra meter. Sig: use once daily and prn ssx high or low sugar        Cholecalciferol (Tab) cholecalciferol 1000 UNIT Take 1,000 Units by mouth 3 times a day.        Cyanocobalamin (Tab) VITAMIN B-12 500 MCG Take 500 mcg by mouth every day.        DiltiaZEM HCl Coated Beads (CAPSULE SR 24 HR) CARTIA  MG TAKE ONE CAPSULE BY MOUTH EVERY DAY        Glucose Blood (Strip) ONE TOUCH ULTRA TEST  TEST ONCE DAILY AND AS NEEDED FOR SYMPTOMS OF HIGH OR LOW SUGAR        HydroCHLOROthiazide (Tab) HYDRODIURIL 25 MG TAKE 1 TABLET BY MOUTH EVERY DAY        Indomethacin (Cap) INDOCIN 50 MG Take 50 mg by mouth 3 times a day.        Indomethacin (Cap) INDOCIN 50 MG TAKE 1 CAPSULE BY MOUTH THREE TIMES DAILY        Lisinopril (Tab) PRINIVIL 20 MG TAKE 1 TABLET BY MOUTH TWICE DAILY        Lovastatin (Tab) MEVACOR 20 MG TAKE 3 TABLETS BY MOUTH EVERY DAY        Omega-3 Fatty Acids (Cap) OMEGA 3 FA 1000 MG Take 1,000 mg by mouth 3 times a day, with meals.        Potassium Chloride   Take  by mouth.        SITagliptin-MetFORMIN HCl (Tab) JANUMET  MG TAKE 1 TABLET TWICE DAILY  WITH MEALS        .                 Medicines prescribed today were sent to:     [id number] - [place], [place] - [address] AT [facility] & [facility]    [address] [facility] [place] [address]     Phone: [phone] Fax: [phone]    Open 24 Hours?: No    [facility] [place] - [address] AT PORTAL TO [facility] SIT[address]    [address] [facility] [address]    Phone: [phone] Fax: [phone]    Open 24 Hours?: No    [id number] - [place], [place] - [address] AT [facility] [address]    [address] [facility] [address]    Phone: [phone] Fax: [phone]    Open 24 Hours?: No      Medication refill instructions:       If your prescription bottle indicates you have medication refills left, it is not necessary to call your provider’s office. Please contact your pharmacy and they will refill your medication.    If your prescription bottle indicates you do not have any refills left, you may request refills at any time through one of the following ways: The online [employer] system (except Urgent Care), by calling your provider’s office, or by asking your pharmacy to contact your provider’s office with a refill request. Medication refills are processed only during regular business hours and may not be available until the next business day. Your provider may request additional information or to have a follow-up visit with you prior to refilling your medication.   *Please Note: Medication refills are assigned a new Rx number when refilled electronically. Your pharmacy may indicate that no refills were authorized even though a new prescription for the same medication is available at the pharmacy. Please request the medicine by name with the pharmacy before contacting your provider for a refill.        Instructions    The side effects of Acupuncture needle insertion include: minor bruising, bleeding, or pain at the site of needle insertion.  If more worrisome symptoms, such as continued bleeding, severe bruising, or continue pain or altered sensation persist, please contact [facility] office @ [phone]            [employer]  Access Code: Activation code not generated  Current MyChart Status: Active

## 2017-03-08 ENCOUNTER — HOSPITAL ENCOUNTER (EMERGENCY)
Facility: MEDICAL CENTER | Age: 77
End: 2017-03-08
Attending: EMERGENCY MEDICINE
Payer: MEDICARE

## 2017-03-08 VITALS
SYSTOLIC BLOOD PRESSURE: 137 MMHG | RESPIRATION RATE: 16 BRPM | OXYGEN SATURATION: 97 % | WEIGHT: 244.27 LBS | HEIGHT: 70 IN | DIASTOLIC BLOOD PRESSURE: 77 MMHG | BODY MASS INDEX: 34.97 KG/M2 | TEMPERATURE: 98 F | HEART RATE: 72 BPM

## 2017-03-08 DIAGNOSIS — I10 ESSENTIAL HYPERTENSION: ICD-10-CM

## 2017-03-08 DIAGNOSIS — Z91.09 ENVIRONMENTAL ALLERGIES: ICD-10-CM

## 2017-03-08 LAB
ALBUMIN SERPL BCP-MCNC: 4.2 G/DL (ref 3.2–4.9)
ALBUMIN/GLOB SERPL: 1.2 G/DL
ALP SERPL-CCNC: 70 U/L (ref 30–99)
ALT SERPL-CCNC: 17 U/L (ref 2–50)
ANION GAP SERPL CALC-SCNC: 11 MMOL/L (ref 0–11.9)
APTT PPP: 30.8 SEC (ref 24.7–36)
AST SERPL-CCNC: 23 U/L (ref 12–45)
BASOPHILS # BLD AUTO: 0.9 % (ref 0–1.8)
BASOPHILS # BLD: 0.07 K/UL (ref 0–0.12)
BILIRUB SERPL-MCNC: 0.6 MG/DL (ref 0.1–1.5)
BNP SERPL-MCNC: 99 PG/ML (ref 0–100)
BUN SERPL-MCNC: 16 MG/DL (ref 8–22)
CALCIUM SERPL-MCNC: 9.5 MG/DL (ref 8.4–10.2)
CHLORIDE SERPL-SCNC: 96 MMOL/L (ref 96–112)
CO2 SERPL-SCNC: 27 MMOL/L (ref 20–33)
CREAT SERPL-MCNC: 0.97 MG/DL (ref 0.5–1.4)
EOSINOPHIL # BLD AUTO: 0.12 K/UL (ref 0–0.51)
EOSINOPHIL NFR BLD: 1.5 % (ref 0–6.9)
ERYTHROCYTE [DISTWIDTH] IN BLOOD BY AUTOMATED COUNT: 44.4 FL (ref 35.9–50)
GFR SERPL CREATININE-BSD FRML MDRD: >60 ML/MIN/1.73 M 2
GLOBULIN SER CALC-MCNC: 3.5 G/DL (ref 1.9–3.5)
GLUCOSE SERPL-MCNC: 136 MG/DL (ref 65–99)
HCT VFR BLD AUTO: 40.7 % (ref 42–52)
HGB BLD-MCNC: 14.3 G/DL (ref 14–18)
IMM GRANULOCYTES # BLD AUTO: 0.04 K/UL (ref 0–0.11)
IMM GRANULOCYTES NFR BLD AUTO: 0.5 % (ref 0–0.9)
INR PPP: 0.92 (ref 0.87–1.13)
LIPASE SERPL-CCNC: 24 U/L (ref 7–58)
LYMPHOCYTES # BLD AUTO: 1.78 K/UL (ref 1–4.8)
LYMPHOCYTES NFR BLD: 22.6 % (ref 22–41)
MCH RBC QN AUTO: 30.8 PG (ref 27–33)
MCHC RBC AUTO-ENTMCNC: 35.1 G/DL (ref 33.7–35.3)
MCV RBC AUTO: 87.7 FL (ref 81.4–97.8)
MONOCYTES # BLD AUTO: 0.69 K/UL (ref 0–0.85)
MONOCYTES NFR BLD AUTO: 8.7 % (ref 0–13.4)
NEUTROPHILS # BLD AUTO: 5.19 K/UL (ref 1.82–7.42)
NEUTROPHILS NFR BLD: 65.8 % (ref 44–72)
NRBC # BLD AUTO: 0 K/UL
NRBC BLD AUTO-RTO: 0 /100 WBC
PLATELET # BLD AUTO: 313 K/UL (ref 164–446)
PMV BLD AUTO: 8.7 FL (ref 9–12.9)
POTASSIUM SERPL-SCNC: 3.8 MMOL/L (ref 3.6–5.5)
PROT SERPL-MCNC: 7.7 G/DL (ref 6–8.2)
PROTHROMBIN TIME: 12.2 SEC (ref 12–14.6)
RBC # BLD AUTO: 4.64 M/UL (ref 4.7–6.1)
SODIUM SERPL-SCNC: 134 MMOL/L (ref 135–145)
TROPONIN I SERPL-MCNC: <0.02 NG/ML (ref 0–0.04)
WBC # BLD AUTO: 7.9 K/UL (ref 4.8–10.8)

## 2017-03-08 PROCEDURE — 99284 EMERGENCY DEPT VISIT MOD MDM: CPT

## 2017-03-08 PROCEDURE — 93005 ELECTROCARDIOGRAM TRACING: CPT | Performed by: EMERGENCY MEDICINE

## 2017-03-08 PROCEDURE — 96374 THER/PROPH/DIAG INJ IV PUSH: CPT

## 2017-03-08 PROCEDURE — 85025 COMPLETE CBC W/AUTO DIFF WBC: CPT

## 2017-03-08 PROCEDURE — 36415 COLL VENOUS BLD VENIPUNCTURE: CPT

## 2017-03-08 PROCEDURE — 83880 ASSAY OF NATRIURETIC PEPTIDE: CPT

## 2017-03-08 PROCEDURE — 83690 ASSAY OF LIPASE: CPT

## 2017-03-08 PROCEDURE — 80053 COMPREHEN METABOLIC PANEL: CPT

## 2017-03-08 PROCEDURE — 85610 PROTHROMBIN TIME: CPT

## 2017-03-08 PROCEDURE — A9270 NON-COVERED ITEM OR SERVICE: HCPCS | Performed by: EMERGENCY MEDICINE

## 2017-03-08 PROCEDURE — 85730 THROMBOPLASTIN TIME PARTIAL: CPT

## 2017-03-08 PROCEDURE — 700102 HCHG RX REV CODE 250 W/ 637 OVERRIDE(OP): Performed by: EMERGENCY MEDICINE

## 2017-03-08 PROCEDURE — 700111 HCHG RX REV CODE 636 W/ 250 OVERRIDE (IP): Performed by: EMERGENCY MEDICINE

## 2017-03-08 PROCEDURE — 84484 ASSAY OF TROPONIN QUANT: CPT

## 2017-03-08 RX ORDER — CLONIDINE HYDROCHLORIDE 0.1 MG/1
0.2 TABLET ORAL ONCE
Status: COMPLETED | OUTPATIENT
Start: 2017-03-08 | End: 2017-03-08

## 2017-03-08 RX ORDER — MULTIVIT-MIN/IRON/FOLIC ACID/K 18-600-40
2000 CAPSULE ORAL DAILY
COMMUNITY

## 2017-03-08 RX ORDER — CETIRIZINE HYDROCHLORIDE 10 MG/1
10 TABLET ORAL DAILY
Status: DISCONTINUED | OUTPATIENT
Start: 2017-03-08 | End: 2017-03-08 | Stop reason: HOSPADM

## 2017-03-08 RX ORDER — HYDRALAZINE HYDROCHLORIDE 20 MG/ML
20 INJECTION INTRAMUSCULAR; INTRAVENOUS ONCE
Status: COMPLETED | OUTPATIENT
Start: 2017-03-08 | End: 2017-03-08

## 2017-03-08 RX ORDER — NAPROXEN SODIUM 220 MG
440 TABLET ORAL PRN
Status: SHIPPED | COMMUNITY
End: 2017-03-24

## 2017-03-08 RX ADMIN — CLONIDINE HYDROCHLORIDE 0.2 MG: 0.1 TABLET ORAL at 16:36

## 2017-03-08 RX ADMIN — CETIRIZINE HYDROCHLORIDE 10 MG: 10 TABLET, FILM COATED ORAL at 18:54

## 2017-03-08 RX ADMIN — HYDRALAZINE HYDROCHLORIDE 20 MG: 20 INJECTION INTRAMUSCULAR; INTRAVENOUS at 16:37

## 2017-03-08 ASSESSMENT — PAIN SCALES - GENERAL: PAINLEVEL_OUTOF10: 0

## 2017-03-08 NOTE — ED PROVIDER NOTES
ED Provider Note    CHIEF COMPLAINT  Chief Complaint   Patient presents with   • Blood Pressure Problem        HPI  Primo Ca is a 76 y.o. male who presents to the ED with complaints of hypertension. The patient does have intermittent bouts of hypertension spikes. The patient has been eating some Weir foods high in salt today. He was watching a soccer game in his son room and he started feeling flushed, like what he does, when his blood pressure goes up. He noticed that as 220/120. He did take an additional of his atenolol but he continued to stay high, so he presented to the ED for evaluation. Upon arrival, just feels flushed. Denies any headache, visual acuity changes, chest pain, shortness of breath or any other symptoms.    REVIEW OF SYSTEMS  See HPI for further details. All other systems are negative.     PAST MEDICAL HISTORY  Past Medical History   Diagnosis Date   • Hypertension    • Dyslipidemia    • Gout    • Vertigo    • Obesity    • Diabetes    • GERD (gastroesophageal reflux disease)        FAMILY HISTORY  Family History   Problem Relation Age of Onset   • Stroke Mother    • Hypertension Mother    • Cancer Father      colon cancer;liver   • Stroke Brother    • Hypertension Brother      Patient's family history has been discussed and is been found to be noncontributory to his present illness  SOCIAL HISTORY  Social History     Social History   • Marital Status:      Spouse Name: Evelyn   • Number of Children: N/A   • Years of Education: N/A     Social History Main Topics   • Smoking status: Former Smoker -- 22 years     Types: Pipe     Start date: 04/01/1975     Quit date: 08/02/1981   • Smokeless tobacco: Never Used   • Alcohol Use: No   • Drug Use: No   • Sexual Activity: No      Comment: , 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      SURGICAL HISTORY  Past Surgical History   Procedure  "Laterality Date   • Cholecystectomy         CURRENT MEDICATIONS  Home Medications     **Home medications have not yet been reviewed for this encounter**        No current facility-administered medications on file prior to encounter.     Current Outpatient Prescriptions on File Prior to Encounter   Medication Sig Dispense Refill   • indomethacin (INDOCIN) 50 MG Cap TAKE 1 CAPSULE BY MOUTH THREE TIMES DAILY 90 Cap 3   • JANUMET  MG per tablet TAKE 1 TABLET TWICE DAILY  WITH MEALS 180 Tab 1   • atenolol (TENORMIN) 50 MG Tab TAKE 1 TABLET BY MOUTH TWICE DAILY 180 Tab 3   • CARTIA  MG CAPSULE SR 24 HR TAKE ONE CAPSULE BY MOUTH EVERY DAY 90 Cap 3   • lovastatin (MEVACOR) 20 MG Tab TAKE 3 TABLETS BY MOUTH EVERY  Tab 3   • indomethacin (INDOCIN) 50 MG Cap Take 50 mg by mouth 3 times a day.     • ONE TOUCH ULTRA TEST strip TEST ONCE DAILY AND AS NEEDED FOR SYMPTOMS OF HIGH OR LOW SUGAR 100 Strip 3   • hydrochlorothiazide (HYDRODIURIL) 25 MG Tab TAKE 1 TABLET BY MOUTH EVERY DAY 90 Tab 3   • lisinopril (PRINIVIL) 20 MG Tab TAKE 1 TABLET BY MOUTH TWICE DAILY 180 Tab 3   • POTASSIUM CHLORIDE PO Take  by mouth.     • allopurinol (ZYLOPRIM) 300 MG TABS Take 1 Tab by mouth every day.     • Alpha-Lipoic Acid 600 MG CAPS Take 1 Cap by mouth every day.     • Blood Glucose Monitoring Suppl SUPPLIES MISC Test strips order: Test strips for One Touch Ultra meter. Sig: use once daily and prn ssx high or low sugar 100 Each 3   • cyanocobalamin (VITAMIN B-12) 500 MCG TABS Take 500 mcg by mouth every day.     • docosahexanoic acid (FISH OIL) 1000 MG CAPS Take 1,000 mg by mouth 3 times a day, with meals.     • vitamin D (CHOLECALCIFEROL) 1000 UNIT TABS Take 1,000 Units by mouth 3 times a day.         ALLERGIES  Allergies   Allergen Reactions   • Spironolactone      diarrhea       PHYSICAL EXAM  VITAL SIGNS: /122 mmHg  Pulse 81  Temp(Src) 36.7 °C (98 °F)  Resp 16  Ht 1.778 m (5' 10\")  Wt 110.8 kg (244 lb 4.3 oz)  " BMI 35.05 kg/m2  SpO2 95%   Pulse Oximetry was obtained. It showed a reading of Pulse Oximetry: 95 %.  I interpreted this as not hypoxic.     Constitutional: Well developed, Well nourished, No acute distress, Non-toxic appearance.   HENT: Normocephalic, Atraumatic, Bilateral external ears normal, bilateral tympanic membranes normal, Oropharynx moist, No oral exudates, Nose normal.   Eyes: Pupils are equal round and react to light, extraocular motions are intact, conjunctiva is normal, there are no signs of exudate.   Neck: Supple, no cervical lymphadenopathy, no meningeal signs..   Lymphatic: No lymphadenopathy noted.   Cardiovascular: Regular rate and rhythm without murmurs gallops or rubs.   Thorax & Lungs: Lungs are clear to auscultation bilaterally, there are no wheezes no rales. Chest wall is nontender.  Abdomen: Soft, nontender nondistended. Bowel sounds are present.   Skin: Warm, Dry, No erythema,   Back: No tenderness, No CVA tenderness.   Musculoskeletal: Good range of motion in all major joints. No tenderness to palpation or major deformities noted. Intact distal pulses, no clubbing, no cyanosis, no edema,   Neurologic: Alert & oriented x 3, Normal motor function, Normal sensory function, No focal deficits noted.   Psychiatric: Affect normal, Judgment normal, Mood normal.     EKG  Twelve-lead EKG was obtained interpreted by myself as    Normal sinus rhythm with a rate of 77, axis is normal at 68. P waves are normal. The patient has a nonspecific interventricular conduction delay with a first-degree AV block with a NV interval 220. His no acute ST elevations, nonspecific T-wave inversion in lead 3 and aVF. There are Q waves noted in 3 and aVF as well. Otherwise nonspecific EKG    RADIOLOGY/PROCEDURES  No orders to display     Results for orders placed or performed during the hospital encounter of 03/08/17   CBC WITH DIFFERENTIAL   Result Value Ref Range    WBC 7.9 4.8 - 10.8 K/uL    RBC 4.64 (L) 4.70 -  6.10 M/uL    Hemoglobin 14.3 14.0 - 18.0 g/dL    Hematocrit 40.7 (L) 42.0 - 52.0 %    MCV 87.7 81.4 - 97.8 fL    MCH 30.8 27.0 - 33.0 pg    MCHC 35.1 33.7 - 35.3 g/dL    RDW 44.4 35.9 - 50.0 fL    Platelet Count 313 164 - 446 K/uL    MPV 8.7 (L) 9.0 - 12.9 fL    Neutrophils-Polys 65.80 44.00 - 72.00 %    Lymphocytes 22.60 22.00 - 41.00 %    Monocytes 8.70 0.00 - 13.40 %    Eosinophils 1.50 0.00 - 6.90 %    Basophils 0.90 0.00 - 1.80 %    Immature Granulocytes 0.50 0.00 - 0.90 %    Nucleated RBC 0.00 /100 WBC    Neutrophils (Absolute) 5.19 1.82 - 7.42 K/uL    Lymphs (Absolute) 1.78 1.00 - 4.80 K/uL    Monos (Absolute) 0.69 0.00 - 0.85 K/uL    Eos (Absolute) 0.12 0.00 - 0.51 K/uL    Baso (Absolute) 0.07 0.00 - 0.12 K/uL    Immature Granulocytes (abs) 0.04 0.00 - 0.11 K/uL    NRBC (Absolute) 0.00 K/uL   COMP METABOLIC PANEL   Result Value Ref Range    Sodium 134 (L) 135 - 145 mmol/L    Potassium 3.8 3.6 - 5.5 mmol/L    Chloride 96 96 - 112 mmol/L    Co2 27 20 - 33 mmol/L    Anion Gap 11.0 0.0 - 11.9    Glucose 136 (H) 65 - 99 mg/dL    Bun 16 8 - 22 mg/dL    Creatinine 0.97 0.50 - 1.40 mg/dL    Calcium 9.5 8.4 - 10.2 mg/dL    AST(SGOT) 23 12 - 45 U/L    ALT(SGPT) 17 2 - 50 U/L    Alkaline Phosphatase 70 30 - 99 U/L    Total Bilirubin 0.6 0.1 - 1.5 mg/dL    Albumin 4.2 3.2 - 4.9 g/dL    Total Protein 7.7 6.0 - 8.2 g/dL    Globulin 3.5 1.9 - 3.5 g/dL    A-G Ratio 1.2 g/dL   LIPASE   Result Value Ref Range    Lipase 24 7 - 58 U/L   PROTHROMBIN TIME   Result Value Ref Range    PT 12.2 12.0 - 14.6 sec    INR 0.92 0.87 - 1.13   APTT   Result Value Ref Range    APTT 30.8 24.7 - 36.0 sec   TROPONIN   Result Value Ref Range    Troponin I <0.02 0.00 - 0.04 ng/mL   BTYPE NATRIURETIC PEPTIDE   Result Value Ref Range    B Natriuretic Peptide 99 0 - 100 pg/mL   ESTIMATED GFR   Result Value Ref Range    GFR If African American >60 >60 mL/min/1.73 m 2    GFR If Non African American >60 >60 mL/min/1.73 m 2   EKG (ER)   Result Value Ref  Range    Report       [facility] Emergency Dept.    Test Date:  2017  Pt Name:    MERCEDEZ MCDONALD               Department: [facility]  MRN:        [id number]                      Room:       -ROOM 9  Gender:     M                            Technician: GAEL  :        [id number]                   Requested By:GUIDO CISNEROS  Order #:    [id number]                    Reading MD:    Measurements  Intervals                                Axis  Rate:       77                           P:          0  SD:         222                          QRS:        68  QRSD:       114                          T:          -8  QT:         380  QTc:        431    Interpretive Statements  SINUS RHYTHM  FIRST DEGREE AV BLOCK  NONSPECIFIC INTRAVENTRICULAR CONDUCTION DELAY  Compared to ECG 2015 16:59:08  Intraventricular conduction delay now present  Sinus bradycardia no longer present  Incomplete right bundle-branch block no longer present           COURSE & MEDICAL DECISION MAKING  Pertinent Labs & Imaging studies reviewed. (See chart for details)  Patient resents to ED for evaluation. Upon arrival, the patient's blood pressure was elevated. I did give him 20 mg of hydralazine with 0.2 mg clonidine. Patient was observed. His blood pressure has come down nicely to the 150s systolic, but he still feels flushed and describing an itchiness to his eyes and nose. His nose is congested. At this point, I feel that the eye flushed. This is most likely an allergic reaction or allergy. I started the patient with severe tach. He is on Flonase, apparently for this at home. His blood pressure is lower. He has no signs of end organ injury. I do feel the patient is stable for discharge. I recommended for the patient to follow up with his primary care physician for recheck of his blood pressure and possible adjustment of his blood pressure medications, but also for possible referral to an allergist. He has continued  allergic type symptoms. Certainly an allergy can cause a reactive hypertensive episode. At this point there is no significant and/or injury and I do feel the patient is stable for discharge.    FINAL IMPRESSION  1. Essential hypertension    2. Environmental allergies         The patient will return for new or worsening symptoms and is stable at the time of discharge.    The patient is referred to a primary physician for blood pressure management, diabetic screening, and for all other preventative health concerns.    DISPOSITION:  Patient will be discharged home in stable condition.    FOLLOW UP:  Jefferson Garcia M.D.  [address] [facility] [address]  [address]  [facility] [address]  [phone]    Schedule an appointment as soon as possible for a visit        OUTPATIENT MEDICATIONS:  New Prescriptions    No medications on file             Electronically signed by: Venkata Lawler, 3/8/2017 3:32 PM

## 2017-03-08 NOTE — ED AVS SNAPSHOT
Home Care Instructions                                                                                                                Primo Ca   MRN: [id number]    Department:  [facility], Emergency Dept   Date of Visit:  3/8/2017            [facility], Emergency Dept    [address]    [place] [address]    Phone:  [phone]      You were seen by     Venkata Lawler M.D.      Your Diagnosis Was     Essential hypertension     I10       These are the medications you received during your hospitalization from 03/08/2017 1429 to 03/08/2017 1910     Date/Time Order Dose Route Action    03/08/2017 1636 clonidine (CATAPRES) tablet 0.2 mg 0.2 mg Oral Given    03/08/2017 1637 hydrALAZINE (APRESOLINE) injection 20 mg 20 mg Intravenous Given    03/08/2017 1854 cetirizine (ZYRTEC) tablet 10 mg 10 mg Oral Given      Follow-up Information     1. Schedule an appointment as soon as possible for a visit with Jefferson Garcia M.D..    Specialty:  Family Medicine    Contact information    [address]  [address]  [place] [address] [phone]        Medication Information     Review all of your home medications and newly ordered medications with your primary doctor and/or pharmacist as soon as possible. Follow medication instructions as directed by your doctor and/or pharmacist.     Please keep your complete medication list with you and share with your physician. Update the information when medications are discontinued, doses are changed, or new medications (including over-the-counter products) are added; and carry medication information at all times in the event of emergency situations.               Medication List      ASK your doctor about these medications        Instructions    Morning Afternoon Evening Bedtime    ALEVE 220 MG tablet   Generic drug:  naproxen        Take 440 mg by mouth as needed. Indications: Mild to Moderate Pain   Dose:  440 mg                       allopurinol 300 MG Tabs   Commonly known as:  ZYLOPRIM        Take 1 Tab by mouth every day.   Dose:  300 mg                        Alpha-Lipoic Acid 600 MG Caps        Take 1 Cap by mouth every day.   Dose:  1 Cap                        aspirin EC 81 MG Tbec   Commonly known as:  ECOTRIN        Take 81 mg by mouth every evening.   Dose:  81 mg                        atenolol 50 MG Tabs   Commonly known as:  TENORMIN        TAKE 1 TABLET BY MOUTH TWICE DAILY                        CARTIA  MG Cp24   Generic drug:  diltiazem CD        TAKE ONE CAPSULE BY MOUTH EVERY DAY                        cyanocobalamin 500 MCG Tabs   Commonly known as:  VITAMIN B-12        Take 500 mcg by mouth every day.   Dose:  500 mcg                        docosahexanoic acid 1000 MG Caps   Commonly known as:  OMEGA 3 FA        Take 1,000 mg by mouth every day.   Dose:  1000 mg                        hydrochlorothiazide 25 MG Tabs   Commonly known as:  HYDRODIURIL        TAKE 1 TABLET BY MOUTH EVERY DAY                        JANUMET  MG per tablet   Generic drug:  sitagliptan-metformin        TAKE 1 TABLET TWICE DAILY  WITH MEALS                        lisinopril 20 MG Tabs   Commonly known as:  PRINIVIL        TAKE 1 TABLET BY MOUTH TWICE DAILY                        lovastatin 20 MG Tabs   Commonly known as:  MEVACOR        TAKE 3 TABLETS BY MOUTH EVERY DAY                        Vitamin D 2000 UNITS Caps        Take 2,000 Units by mouth every day.   Dose:  2000 Units                                Procedures and tests performed during your visit     APTT    BTYPE NATRIURETIC PEPTIDE    CBC WITH DIFFERENTIAL    COMP METABOLIC PANEL    EKG (ER)    ESTIMATED GFR    LIPASE    PROTHROMBIN TIME    TROPONIN        Discharge Instructions       Arterial Hypertension  Arterial hypertension (high blood pressure) is a condition of elevated pressure in your blood vessels. Hypertension over a long period of time is a  "risk factor for strokes, heart attacks, and heart failure. It is also the leading cause of kidney (renal) failure.   CAUSES   · In Adults -- Over 90% of all hypertension has no known cause. This is called essential or primary hypertension. In the other 10% of people with hypertension, the increase in blood pressure is caused by another disorder. This is called secondary hypertension. Important causes of secondary hypertension are:  · Heavy alcohol use.  · Obstructive sleep apnea.  · Hyperaldosterosim (Conn's syndrome).  · Steroid use.  · Chronic kidney failure.  · Hyperparathyroidism.  · Medications.  · Renal artery stenosis.  · Pheochromocytoma.  · Cushing's disease.  · Coarctation of the aorta.  · Scleroderma renal crisis.  · Licorice (in excessive amounts).  · Drugs (cocaine, methamphetamine).  Your caregiver can explain any items above that apply to you.  · In Children -- Secondary hypertension is more common and should always be considered.  · Pregnancy -- Few women of childbearing age have high blood pressure. However, up to 10% of them develop hypertension of pregnancy. Generally, this will not harm the woman. It may be a sign of 3 complications of pregnancy: preeclampsia, HELLP syndrome, and eclampsia. Follow up and control with medication is necessary.  SYMPTOMS   · This condition normally does not produce any noticeable symptoms. It is usually found during a routine exam.  · Malignant hypertension is a late problem of high blood pressure. It may have the following symptoms:  · Headaches.  · Blurred vision.  · End-organ damage (this means your kidneys, heart, lungs, and other organs are being damaged).  · Stressful situations can increase the blood pressure. If a person with normal blood pressure has their blood pressure go up while being seen by their caregiver, this is often termed \"white coat hypertension.\" Its importance is not known. It may be related with eventually developing hypertension or " "complications of hypertension.  · Hypertension is often confused with mental tension, stress, and anxiety.  DIAGNOSIS   The diagnosis is made by 3 separate blood pressure measurements. They are taken at least 1 week apart from each other. If there is organ damage from hypertension, the diagnosis may be made without repeat measurements.  Hypertension is usually identified by having blood pressure readings:  · Above 140/90 mmHg measured in both arms, at 3 separate times, over a couple weeks.  · Over 130/80 mmHg should be considered a risk factor and may require treatment in patients with diabetes.  Blood pressure readings over 120/80 mmHg are called \"pre-hypertension\" even in non-diabetic patients.  To get a true blood pressure measurement, use the following guidelines. Be aware of the factors that can alter blood pressure readings.  · Take measurements at least 1 hour after caffeine.  · Take measurements 30 minutes after smoking and without any stress. This is another reason to quit smoking  it raises your blood pressure.  · Use a proper cuff size. Ask your caregiver if you are not sure about your cuff size.  · Most home blood pressure cuffs are automatic. They will measure systolic and diastolic pressures. The systolic pressure is the pressure reading at the start of sounds. Diastolic pressure is the pressure at which the sounds disappear. If you are elderly, measure pressures in multiple postures. Try sitting, lying or standing.  · Sit at rest for a minimum of 5 minutes before taking measurements.  · You should not be on any medications like decongestants. These are found in many cold medications.  · Record your blood pressure readings and review them with your caregiver.  If you have hypertension:  · Your caregiver may do tests to be sure you do not have secondary hypertension (see \"causes\" above).  · Your caregiver may also look for signs of metabolic syndrome. This is also called Syndrome X or Insulin Resistance " Syndrome. You may have this syndrome if you have type 2 diabetes, abdominal obesity, and abnormal blood lipids in addition to hypertension.  · Your caregiver will take your medical and family history and perform a physical exam.  · Diagnostic tests may include blood tests (for glucose, cholesterol, potassium, and kidney function), a urinalysis, or an EKG. Other tests may also be necessary depending on your condition.  PREVENTION   There are important lifestyle issues that you can adopt to reduce your chance of developing hypertension:  · Maintain a normal weight.  · Limit the amount of salt (sodium) in your diet.  · Exercise often.  · Limit alcohol intake.  · Get enough potassium in your diet. Discuss specific advice with your caregiver.  · Follow a DASH diet (dietary approaches to stop hypertension). This diet is rich in fruits, vegetables, and low-fat dairy products, and avoids certain fats.  PROGNOSIS   Essential hypertension cannot be cured. Lifestyle changes and medical treatment can lower blood pressure and reduce complications. The prognosis of secondary hypertension depends on the underlying cause. Many people whose hypertension is controlled with medicine or lifestyle changes can live a normal, healthy life.   RISKS AND COMPLICATIONS   While high blood pressure alone is not an illness, it often requires treatment due to its short- and long-term effects on many organs. Hypertension increases your risk for:  · CVAs or strokes (cerebrovascular accident).  · Heart failure due to chronically high blood pressure (hypertensive cardiomyopathy).  · Heart attack (myocardial infarction).  · Damage to the retina (hypertensive retinopathy).  · Kidney failure (hypertensive nephropathy).  Your caregiver can explain list items above that apply to you. Treatment of hypertension can significantly reduce the risk of complications.  TREATMENT   · For overweight patients, weight loss and regular exercise are recommended.  "Physical fitness lowers blood pressure.  · Mild hypertension is usually treated with diet and exercise. A diet rich in fruits and vegetables, fat-free dairy products, and foods low in fat and salt (sodium) can help lower blood pressure. Decreasing salt intake decreases blood pressure in a 1/3 of people.  · Stop smoking if you are a smoker.  The steps above are highly effective in reducing blood pressure. While these actions are easy to suggest, they are difficult to achieve. Most patients with moderate or severe hypertension end up requiring medications to bring their blood pressure down to a normal level. There are several classes of medications for treatment. Blood pressure pills (antihypertensives) will lower blood pressure by their different actions. Lowering the blood pressure by 10 mmHg may decrease the risk of complications by as much as 25%.  The goal of treatment is effective blood pressure control. This will reduce your risk for complications. Your caregiver will help you determine the best treatment for you according to your lifestyle. What is excellent treatment for one person, may not be for you.  HOME CARE INSTRUCTIONS   · Do not smoke.  · Follow the lifestyle changes outlined in the \"Prevention\" section.  · If you are on medications, follow the directions carefully. Blood pressure medications must be taken as prescribed. Skipping doses reduces their benefit. It also puts you at risk for problems.  · Follow up with your caregiver, as directed.  · If you are asked to monitor your blood pressure at home, follow the guidelines in the \"Diagnosis\" section above.  SEEK MEDICAL CARE IF:   · You think you are having medication side effects.  · You have recurrent headaches or lightheadedness.  · You have swelling in your ankles.  · You have trouble with your vision.  SEEK IMMEDIATE MEDICAL CARE IF:   · You have sudden onset of chest pain or pressure, difficulty breathing, or other symptoms of a heart " attack.  · You have a severe headache.  · You have symptoms of a stroke (such as sudden weakness, difficulty speaking, difficulty walking).  MAKE SURE YOU:   · Understand these instructions.  · Will watch your condition.  · Will get help right away if you are not doing well or get worse.  Document Released: 12/18/2006 Document Revised: 03/11/2013 Document Reviewed: 07/17/2008  ExitCare® Patient Information ©2014 [employer].      Allergies  An allergy is an abnormal reaction to a substance by the body's defense system (immune system). Allergies can develop at any age.  WHAT CAUSES ALLERGIES?  An allergic reaction happens when the immune system mistakenly reacts to a normally harmless substance, called an allergen, as if it were harmful. The immune system releases antibodies to fight the substance. Antibodies eventually release a chemical called histamine into the bloodstream. The release of histamine is meant to protect the body from infection, but it also causes discomfort.  An allergic reaction can be triggered by:  · Eating an allergen.  · Inhaling an allergen.  · Touching an allergen.  WHAT TYPES OF ALLERGIES ARE THERE?  There are many types of allergies. Common types include:  · Seasonal allergies. People with this type of allergy are usually allergic to substances that are only present during certain seasons, such as molds and pollens.  · Food allergies.  · Drug allergies.  · Insect allergies.  · Animal dander allergies.  WHAT ARE SYMPTOMS OF ALLERGIES?  Possible allergy symptoms include:  · Swelling of the lips, face, tongue, mouth, or throat.  · Sneezing, coughing, or wheezing.  · Nasal congestion.  · Tingling in the mouth.  · Rash.  · Itching.  · Itchy, red, swollen areas of skin (hives).  · Watery eyes.  · Vomiting.  · Diarrhea.  · Dizziness.  · Lightheadedness.  · Fainting.  · Trouble breathing or swallowing.  · Chest tightness.  · Rapid heartbeat.  HOW ARE ALLERGIES DIAGNOSED?  Allergies are diagnosed  with a medical and family history and one or more of the following:  · Skin tests.  · Blood tests.  · A food diary. A food diary is a record of all the foods and drinks you have in a day and of all the symptoms you experience.  · The results of an elimination diet. An elimination diet involves eliminating foods from your diet and then adding them back in one by one to find out if a certain food causes an allergic reaction.  HOW ARE ALLERGIES TREATED?  There is no cure for allergies, but allergic reactions can be treated with medicine. Severe reactions usually need to be treated at a hospital.  HOW CAN REACTIONS BE PREVENTED?  The best way to prevent an allergic reaction is by avoiding the substance you are allergic to. Allergy shots and medicines can also help prevent reactions in some cases. People with severe allergic reactions may be able to prevent a life-threatening reaction called anaphylaxis with a medicine given right after exposure to the allergen.     This information is not intended to replace advice given to you by your health care provider. Make sure you discuss any questions you have with your health care provider.     Document Released: 03/12/2004 Document Revised: 01/08/2016 Document Reviewed: 09/29/2015  [employer] Interactive Patient Education ©2016 [employer]            Patient Information     Patient Information    Following emergency treatment: all patient requiring follow-up care must return either to a private physician or a clinic if your condition worsens before you are able to obtain further medical attention, please return to the emergency room.     Billing Information    At [facility], we work to make the billing process streamlined for our patients.  Our Representatives are here to answer any questions you may have regarding your hospital bill.  If you have insurance coverage and have supplied your insurance information to us, we will submit a claim to your insurer on your behalf.   Should you have any questions regarding your bill, we can be reached online or by phone as follows:  Online: You are able pay your bills online or live chat with our representatives about any billing questions you may have. We are here to help Monday - Friday from 8:00am to 7:30pm and 9:00am - 12:00pm on Saturdays.  Please visit [email or web address]  for more information.   Phone:  [phone] or [phone]    Please note that your emergency physician, surgeon, pathologist, radiologist, anesthesiologist, and other specialists are not employed by [facility] and will therefore bill separately for their services.  Please contact them directly for any questions concerning their bills at the numbers below:     Emergency Physician Services:  [phone]  [facility]:  [phone]  [facility]:  [phone]  [facility]:  [phone]    1. Your final bill may vary from the amount quoted upon discharge if all procedures are not complete at that time, or if your doctor has additional procedures of which we are not aware. You will receive an additional bill if you return to the Emergency Department at [facility] for suture removal regardless of the facility of which the sutures were placed.     2. Please arrange for settlement of this account at the emergency registration.    3. All self-pay accounts are due in full at the time of treatment.  If you are unable to meet this obligation then payment is expected within 4-5 days.     4. If you have had radiology studies (CT, X-ray, Ultrasound, MRI), you have received a preliminary result during your emergency department visit. Please contact the radiology department [phone] to receive a copy of your final result. Please discuss the Final result with your primary physician or with the follow up physician provided.     Crisis Hotline:  National Crisis Hotline:  9-800-FONWYMS or  1-942.189.1161  [place] Crisis Hotline:    1-179.822.2937 or 322-246-0886         ED Discharge Follow Up Questions    1. In order to provide you with very good care, we would like to follow up with a phone call in the next few days.  May we have your permission to contact you?     YES /  NO    2. What is the best phone number to call you? (       )_____-__________    3. What is the best time to call you?      Morning  /  Afternoon  /  Evening                   Patient Signature:  ____________________________________________________________    Date:  ____________________________________________________________      Your appointments     Mar 10, 2017  4:20 PM   Established Patient with Jefferson Garcia M.D.   [facility])    [address]  [facility] [id number] [phone]           You will be receiving a confirmation call a few days before your appointment from our automated call confirmation system.            Apr 26, 2017 10:00 AM   ACUPUNCTURE 30 with Kal Huizar M.D.   [facility] (--)    [address] [address] [facility] [address]  [facility] [id number] [phone]

## 2017-03-08 NOTE — ED AVS SNAPSHOT
3/8/2017          Primo Gambino Lanny  [address]  [place] [address]    Dear Primo:    [facility] wants to ensure your discharge home is safe and you or your loved ones have had all your questions answered regarding your care after you leave the hospital.    You may receive a telephone call within two days of your discharge.  This call is to make certain you understand your discharge instructions as well as ensure we provided you with the best care possible during your stay with us.     The call will only last approximately 3-5 minutes and will be done by a nurse.    Once again, we want to ensure your discharge home is safe and that you have a clear understanding of any next steps in your care.  If you have any questions or concerns, please do not hesitate to contact us, we are here for you.  Thank you for choosing [facility] for your healthcare needs.    Sincerely,    Slick Louis    [facility]

## 2017-03-08 NOTE — ED NOTES
"Chief Complaint   Patient presents with   • Blood Pressure Problem     Pulse 81  Temp(Src) 36.7 °C (98 °F)  Resp 16  Ht 1.778 m (5' 10\")  Wt 110.8 kg (244 lb 4.3 oz)  BMI 35.05 kg/m2  SpO2 95%    Pt denies c/o CP, denies HA, denies abd pain.  C/O generalized weakness    "

## 2017-03-09 NOTE — ED NOTES
"Med rec updated and complete  Allergies reviewed  Pt states \"No antibiotics in the last 30 days\".  Pt states \"I took my ATENOLOL 50MG today (3/8/2017) @ 0800 and 1300\".  "

## 2017-03-09 NOTE — DISCHARGE INSTRUCTIONS
Arterial Hypertension  Arterial hypertension (high blood pressure) is a condition of elevated pressure in your blood vessels. Hypertension over a long period of time is a risk factor for strokes, heart attacks, and heart failure. It is also the leading cause of kidney (renal) failure.   CAUSES   · In Adults -- Over 90% of all hypertension has no known cause. This is called essential or primary hypertension. In the other 10% of people with hypertension, the increase in blood pressure is caused by another disorder. This is called secondary hypertension. Important causes of secondary hypertension are:  · Heavy alcohol use.  · Obstructive sleep apnea.  · Hyperaldosterosim (Conn's syndrome).  · Steroid use.  · Chronic kidney failure.  · Hyperparathyroidism.  · Medications.  · Renal artery stenosis.  · Pheochromocytoma.  · Cushing's disease.  · Coarctation of the aorta.  · Scleroderma renal crisis.  · Licorice (in excessive amounts).  · Drugs (cocaine, methamphetamine).  Your caregiver can explain any items above that apply to you.  · In Children -- Secondary hypertension is more common and should always be considered.  · Pregnancy -- Few women of childbearing age have high blood pressure. However, up to 10% of them develop hypertension of pregnancy. Generally, this will not harm the woman. It may be a sign of 3 complications of pregnancy: preeclampsia, HELLP syndrome, and eclampsia. Follow up and control with medication is necessary.  SYMPTOMS   · This condition normally does not produce any noticeable symptoms. It is usually found during a routine exam.  · Malignant hypertension is a late problem of high blood pressure. It may have the following symptoms:  · Headaches.  · Blurred vision.  · End-organ damage (this means your kidneys, heart, lungs, and other organs are being damaged).  · Stressful situations can increase the blood pressure. If a person with normal blood pressure has their blood pressure go up while being  "seen by their caregiver, this is often termed \"white coat hypertension.\" Its importance is not known. It may be related with eventually developing hypertension or complications of hypertension.  · Hypertension is often confused with mental tension, stress, and anxiety.  DIAGNOSIS   The diagnosis is made by 3 separate blood pressure measurements. They are taken at least 1 week apart from each other. If there is organ damage from hypertension, the diagnosis may be made without repeat measurements.  Hypertension is usually identified by having blood pressure readings:  · Above 140/90 mmHg measured in both arms, at 3 separate times, over a couple weeks.  · Over 130/80 mmHg should be considered a risk factor and may require treatment in patients with diabetes.  Blood pressure readings over 120/80 mmHg are called \"pre-hypertension\" even in non-diabetic patients.  To get a true blood pressure measurement, use the following guidelines. Be aware of the factors that can alter blood pressure readings.  · Take measurements at least 1 hour after caffeine.  · Take measurements 30 minutes after smoking and without any stress. This is another reason to quit smoking  it raises your blood pressure.  · Use a proper cuff size. Ask your caregiver if you are not sure about your cuff size.  · Most home blood pressure cuffs are automatic. They will measure systolic and diastolic pressures. The systolic pressure is the pressure reading at the start of sounds. Diastolic pressure is the pressure at which the sounds disappear. If you are elderly, measure pressures in multiple postures. Try sitting, lying or standing.  · Sit at rest for a minimum of 5 minutes before taking measurements.  · You should not be on any medications like decongestants. These are found in many cold medications.  · Record your blood pressure readings and review them with your caregiver.  If you have hypertension:  · Your caregiver may do tests to be sure you do not have " "secondary hypertension (see \"causes\" above).  · Your caregiver may also look for signs of metabolic syndrome. This is also called Syndrome X or Insulin Resistance Syndrome. You may have this syndrome if you have type 2 diabetes, abdominal obesity, and abnormal blood lipids in addition to hypertension.  · Your caregiver will take your medical and family history and perform a physical exam.  · Diagnostic tests may include blood tests (for glucose, cholesterol, potassium, and kidney function), a urinalysis, or an EKG. Other tests may also be necessary depending on your condition.  PREVENTION   There are important lifestyle issues that you can adopt to reduce your chance of developing hypertension:  · Maintain a normal weight.  · Limit the amount of salt (sodium) in your diet.  · Exercise often.  · Limit alcohol intake.  · Get enough potassium in your diet. Discuss specific advice with your caregiver.  · Follow a DASH diet (dietary approaches to stop hypertension). This diet is rich in fruits, vegetables, and low-fat dairy products, and avoids certain fats.  PROGNOSIS   Essential hypertension cannot be cured. Lifestyle changes and medical treatment can lower blood pressure and reduce complications. The prognosis of secondary hypertension depends on the underlying cause. Many people whose hypertension is controlled with medicine or lifestyle changes can live a normal, healthy life.   RISKS AND COMPLICATIONS   While high blood pressure alone is not an illness, it often requires treatment due to its short- and long-term effects on many organs. Hypertension increases your risk for:  · CVAs or strokes (cerebrovascular accident).  · Heart failure due to chronically high blood pressure (hypertensive cardiomyopathy).  · Heart attack (myocardial infarction).  · Damage to the retina (hypertensive retinopathy).  · Kidney failure (hypertensive nephropathy).  Your caregiver can explain list items above that apply to you. Treatment " "of hypertension can significantly reduce the risk of complications.  TREATMENT   · For overweight patients, weight loss and regular exercise are recommended. Physical fitness lowers blood pressure.  · Mild hypertension is usually treated with diet and exercise. A diet rich in fruits and vegetables, fat-free dairy products, and foods low in fat and salt (sodium) can help lower blood pressure. Decreasing salt intake decreases blood pressure in a 1/3 of people.  · Stop smoking if you are a smoker.  The steps above are highly effective in reducing blood pressure. While these actions are easy to suggest, they are difficult to achieve. Most patients with moderate or severe hypertension end up requiring medications to bring their blood pressure down to a normal level. There are several classes of medications for treatment. Blood pressure pills (antihypertensives) will lower blood pressure by their different actions. Lowering the blood pressure by 10 mmHg may decrease the risk of complications by as much as 25%.  The goal of treatment is effective blood pressure control. This will reduce your risk for complications. Your caregiver will help you determine the best treatment for you according to your lifestyle. What is excellent treatment for one person, may not be for you.  HOME CARE INSTRUCTIONS   · Do not smoke.  · Follow the lifestyle changes outlined in the \"Prevention\" section.  · If you are on medications, follow the directions carefully. Blood pressure medications must be taken as prescribed. Skipping doses reduces their benefit. It also puts you at risk for problems.  · Follow up with your caregiver, as directed.  · If you are asked to monitor your blood pressure at home, follow the guidelines in the \"Diagnosis\" section above.  SEEK MEDICAL CARE IF:   · You think you are having medication side effects.  · You have recurrent headaches or lightheadedness.  · You have swelling in your ankles.  · You have trouble with " your vision.  SEEK IMMEDIATE MEDICAL CARE IF:   · You have sudden onset of chest pain or pressure, difficulty breathing, or other symptoms of a heart attack.  · You have a severe headache.  · You have symptoms of a stroke (such as sudden weakness, difficulty speaking, difficulty walking).  MAKE SURE YOU:   · Understand these instructions.  · Will watch your condition.  · Will get help right away if you are not doing well or get worse.  Document Released: 12/18/2006 Document Revised: 03/11/2013 Document Reviewed: 07/17/2008  ExitCare® Patient Information ©2014 [employer].      Allergies  An allergy is an abnormal reaction to a substance by the body's defense system (immune system). Allergies can develop at any age.  WHAT CAUSES ALLERGIES?  An allergic reaction happens when the immune system mistakenly reacts to a normally harmless substance, called an allergen, as if it were harmful. The immune system releases antibodies to fight the substance. Antibodies eventually release a chemical called histamine into the bloodstream. The release of histamine is meant to protect the body from infection, but it also causes discomfort.  An allergic reaction can be triggered by:  · Eating an allergen.  · Inhaling an allergen.  · Touching an allergen.  WHAT TYPES OF ALLERGIES ARE THERE?  There are many types of allergies. Common types include:  · Seasonal allergies. People with this type of allergy are usually allergic to substances that are only present during certain seasons, such as molds and pollens.  · Food allergies.  · Drug allergies.  · Insect allergies.  · Animal dander allergies.  WHAT ARE SYMPTOMS OF ALLERGIES?  Possible allergy symptoms include:  · Swelling of the lips, face, tongue, mouth, or throat.  · Sneezing, coughing, or wheezing.  · Nasal congestion.  · Tingling in the mouth.  · Rash.  · Itching.  · Itchy, red, swollen areas of skin (hives).  · Watery  eyes.  · Vomiting.  · Diarrhea.  · Dizziness.  · Lightheadedness.  · Fainting.  · Trouble breathing or swallowing.  · Chest tightness.  · Rapid heartbeat.  HOW ARE ALLERGIES DIAGNOSED?  Allergies are diagnosed with a medical and family history and one or more of the following:  · Skin tests.  · Blood tests.  · A food diary. A food diary is a record of all the foods and drinks you have in a day and of all the symptoms you experience.  · The results of an elimination diet. An elimination diet involves eliminating foods from your diet and then adding them back in one by one to find out if a certain food causes an allergic reaction.  HOW ARE ALLERGIES TREATED?  There is no cure for allergies, but allergic reactions can be treated with medicine. Severe reactions usually need to be treated at a hospital.  HOW CAN REACTIONS BE PREVENTED?  The best way to prevent an allergic reaction is by avoiding the substance you are allergic to. Allergy shots and medicines can also help prevent reactions in some cases. People with severe allergic reactions may be able to prevent a life-threatening reaction called anaphylaxis with a medicine given right after exposure to the allergen.     This information is not intended to replace advice given to you by your health care provider. Make sure you discuss any questions you have with your health care provider.     Document Released: 03/12/2004 Document Revised: 01/08/2016 Document Reviewed: 09/29/2015  [employer] Interactive Patient Education ©2016 [employer]

## 2017-03-09 NOTE — ED NOTES
Discharge information provided. Pt verbalized understanding of discharge instructions to follow up with PCP and to return to ER if condition worsens. Pt expressed the awareness of not driving or operating heavy machinery, has ride home with wife. Pt ambulated out of ER in a steady gait, no additional questions or concerns.

## 2017-03-10 ENCOUNTER — OFFICE VISIT (OUTPATIENT)
Dept: MEDICAL GROUP | Facility: MEDICAL CENTER | Age: 77
End: 2017-03-10
Payer: MEDICARE

## 2017-03-10 ENCOUNTER — PATIENT MESSAGE (OUTPATIENT)
Dept: MEDICAL GROUP | Facility: MEDICAL CENTER | Age: 77
End: 2017-03-10

## 2017-03-10 VITALS
BODY MASS INDEX: 34.22 KG/M2 | TEMPERATURE: 97.5 F | HEIGHT: 70 IN | DIASTOLIC BLOOD PRESSURE: 98 MMHG | HEART RATE: 79 BPM | SYSTOLIC BLOOD PRESSURE: 172 MMHG | RESPIRATION RATE: 16 BRPM | WEIGHT: 239 LBS | OXYGEN SATURATION: 94 %

## 2017-03-10 DIAGNOSIS — I10 ESSENTIAL HYPERTENSION: ICD-10-CM

## 2017-03-10 DIAGNOSIS — F41.8 ANXIETY ABOUT HEALTH: ICD-10-CM

## 2017-03-10 DIAGNOSIS — E11.40 TYPE 2 DIABETES, CONTROLLED, WITH NEUROPATHY (HCC): ICD-10-CM

## 2017-03-10 DIAGNOSIS — J30.1 SEASONAL ALLERGIC RHINITIS DUE TO POLLEN: ICD-10-CM

## 2017-03-10 DIAGNOSIS — E78.5 DYSLIPIDEMIA: ICD-10-CM

## 2017-03-10 DIAGNOSIS — E66.9 OBESITY (BMI 35.0-39.9 WITHOUT COMORBIDITY): Chronic | ICD-10-CM

## 2017-03-10 PROCEDURE — 1101F PT FALLS ASSESS-DOCD LE1/YR: CPT | Performed by: FAMILY MEDICINE

## 2017-03-10 PROCEDURE — 4040F PNEUMOC VAC/ADMIN/RCVD: CPT | Performed by: FAMILY MEDICINE

## 2017-03-10 PROCEDURE — G8484 FLU IMMUNIZE NO ADMIN: HCPCS | Performed by: FAMILY MEDICINE

## 2017-03-10 PROCEDURE — G8419 CALC BMI OUT NRM PARAM NOF/U: HCPCS | Performed by: FAMILY MEDICINE

## 2017-03-10 PROCEDURE — 1036F TOBACCO NON-USER: CPT | Performed by: FAMILY MEDICINE

## 2017-03-10 PROCEDURE — G8432 DEP SCR NOT DOC, RNG: HCPCS | Performed by: FAMILY MEDICINE

## 2017-03-10 PROCEDURE — 99214 OFFICE O/P EST MOD 30 MIN: CPT | Performed by: FAMILY MEDICINE

## 2017-03-10 RX ORDER — DIAZEPAM 2 MG/1
2 TABLET ORAL
Qty: 30 TAB | Refills: 1 | Status: SHIPPED
Start: 2017-03-10 | End: 2017-03-13 | Stop reason: SDUPTHER

## 2017-03-10 RX ORDER — FLUTICASONE PROPIONATE 50 MCG
2 SPRAY, SUSPENSION (ML) NASAL DAILY
Qty: 16 G | Refills: 3 | Status: SHIPPED | OUTPATIENT
Start: 2017-03-10 | End: 2018-05-07 | Stop reason: SDUPTHER

## 2017-03-10 NOTE — MR AVS SNAPSHOT
"        Primo Gambino Lanny   3/10/2017 4:20 PM   Office Visit   MRN: [id number]    Department:  [facility]   Dept Phone:  [phone]    Description:  Male : 1940   Provider:  Jefferson Garcia M.D.           Reason for Visit     Hypertension           Allergies as of 3/10/2017     Allergen Noted Reactions    Spironolactone 2016       diarrhea      You were diagnosed with     Essential hypertension   [0630715]       Anxiety about health   [5198467]       Seasonal allergic rhinitis due to pollen   [8876143]       Obesity (BMI 35.0-39.9 without comorbidity) ([facility])   [267794]       Type 2 diabetes, controlled, with neuropathy (CMS-HCC)   [486880]       Dyslipidemia   [479967]         Vital Signs     Blood Pressure Pulse Temperature Respirations Height Weight    172/98 mmHg 79 36.4 °C (97.5 °F) 16 1.778 m (5' 10\") 108.41 kg (239 lb)    Body Mass Index Oxygen Saturation Smoking Status             34.29 kg/m2 94% Former Smoker         Basic Information     Date Of Birth Sex Race Ethnicity Preferred Language    1940 Male White Non- English      Your appointments     2017 10:00 AM   ACUPUNCTURE 30 with Kal Huizar M.D.   [facility] (--)    [address] [facility] [id number] [phone]              Problem List              ICD-10-CM Priority Class Noted - Resolved    Type 2 diabetes, controlled, with neuropathy (CMS-HCC) E11.40   10/19/2009 - Present    HTN (hypertension) I10   10/19/2009 - Present    Dyslipidemia E78.5   10/19/2009 - Present    Chronic gout M1A.9XX0   10/19/2009 - Present    GERD (gastroesophageal reflux disease) K21.9   10/19/2009 - Present    Obesity (BMI 35.0-39.9 without comorbidity) ([facility]) (Chronic) E66.9   Unknown - Present    Leg pain M79.606   2013 - Present    Seasonal allergies J30.2   2/10/2014 - Present    Cerebral microvascular disease I67.9   3/4/2014 - Present    Diabetic neuropathy, type II " diabetes mellitus (CMS-HCC) E11.40   3/20/2014 - Present    Varicose veins of legs I83.93   3/20/2014 - Present    Actinic keratosis L57.0   3/20/2014 - Present    History of squamous cell carcinoma of skin Z85.828   3/20/2014 - Present    Diverticulosis of colon K57.30   8/22/2014 - Present    Neck pain on left side M54.2   10/30/2014 - Present    BPH (benign prostatic hyperplasia) N40.0   4/2/2015 - Present    LAEX on CPAP G47.33   4/29/2015 - Present    Cervical radiculopathy M54.12   10/8/2015 - Present    Peripheral neuropathy, idiopathic G60.9   10/8/2015 - Present    Primary osteoarthritis of both wrists M19.031, M19.032   3/1/2016 - Present    Absolute anemia D64.9   3/15/2016 - Present    Obesity (BMI 30-39.9) E66.9   9/22/2016 - Present      Health Maintenance        Date Due Completion Dates    IMM DTaP/Tdap/Td Vaccine (1 - Tdap) 8[birth year]27/1959 ---    RETINAL SCREENING 4/22/2016 4/22/2015, 11/24/2014    IMM INFLUENZA (1) 9/1/2016 10/19/2009, 10/25/2007    A1C SCREENING 3/16/2017 9/16/2016, 2/29/2016, 10/19/2015, 3/17/2015, 10/23/2014, 2/3/2014, 11/4/2013, 5/24/2013, 10/15/2012, 4/5/2012, 11/14/2011    FASTING LIPID PROFILE 9/16/2017 9/16/2016, 2/29/2016, 10/19/2015, 3/17/2015, 10/23/2014, 3/5/2014, 11/4/2013, 10/15/2012, 4/5/2012, 2/11/2011, 10/12/2009    URINE ACR / MICROALBUMIN 9/16/2017 9/16/2016, 2/29/2016, 10/19/2015, 3/17/2015, 10/23/2014, 2/3/2014, 4/5/2012    IMM PNEUMOCOCCAL 65+ (ADULT) LOW/MEDIUM RISK SERIES (2 of 2 - PPSV23) 9/22/2017 9/22/2016    DIABETES MONOFILAMENT / LE EXAM 9/22/2017 9/22/2016, 4/2/2015, 3/20/2014    SERUM CREATININE 3/8/2018 3/8/2017, 9/16/2016, 5/9/2016, 4/11/2016, 2/29/2016, 11/26/2015, 10/19/2015, 4/28/2015, 4/26/2015, 3/17/2015, 10/23/2014, 3/4/2014, 2/3/2014, 1/15/2013, 12/9/2011, 2/11/2011, 10/12/2009    COLONOSCOPY 8/19/2024 8/19/2014            Current Immunizations     13-VALENT PCV PREVNAR 9/22/2016    INFLUENZA VACCINE H1N1 10/1/2013    Influenza TIV (IM) 10/19/2009      Influenza Vaccine Pediatric 10/25/2007    Pneumococcal Vaccine (UF)Historical Data 1/1/2010    SHINGLES VACCINE 10/19/2009      Below and/or attached are the medications your provider expects you to take. Review all of your home medications and newly ordered medications with your provider and/or pharmacist. Follow medication instructions as directed by your provider and/or pharmacist. Please keep your medication list with you and share with your provider. Update the information when medications are discontinued, doses are changed, or new medications (including over-the-counter products) are added; and carry medication information at all times in the event of emergency situations     Allergies:  SPIRONOLACTONE - (reactions not documented)               Medications  Valid as of: March 10, 2017 -  4:43 PM    Generic Name Brand Name Tablet Size Instructions for use    Alpha-Lipoic Acid (Cap) Alpha-Lipoic Acid 600 MG Take 1 Cap by mouth every day.        Aspirin (Tablet Delayed Response) ECOTRIN 81 MG Take 81 mg by mouth every evening.        Atenolol (Tab) TENORMIN 50 MG TAKE 1 TABLET BY MOUTH TWICE DAILY        Cholecalciferol (Cap) Vitamin D 2000 UNITS Take 2,000 Units by mouth every day.        Cyanocobalamin (Tab) VITAMIN B-12 500 MCG Take 500 mcg by mouth every day.        DiazePAM (Tab) VALIUM 2 MG Take 1 Tab by mouth 1 time daily as needed for Anxiety.        DiltiaZEM HCl Coated Beads (CAPSULE SR 24 HR) CARTIA  MG TAKE ONE CAPSULE BY MOUTH EVERY DAY        Fluticasone Propionate (Suspension) FLONASE 50 MCG/ACT Spray 2 Sprays in nose every day.        HydroCHLOROthiazide (Tab) HYDRODIURIL 25 MG TAKE 1 TABLET BY MOUTH EVERY DAY        Lisinopril (Tab) PRINIVIL 20 MG TAKE 1 TABLET BY MOUTH TWICE DAILY        Lovastatin (Tab) MEVACOR 20 MG TAKE 3 TABLETS BY MOUTH EVERY DAY        Naproxen Sodium (Tab) ANAPROX 220 MG Take 440 mg by mouth as needed. Indications: Mild to Moderate Pain        Omega-3 Fatty  Acids (Cap) OMEGA 3 FA 1000 MG Take 1,000 mg by mouth every day.        SITagliptin-MetFORMIN HCl (Tab) JANUMET  MG TAKE 1 TABLET TWICE DAILY  WITH MEALS        .                 Medicines prescribed today were sent to:     [id number] - [place], [place] - [address] [facility] AT [facility] & [facility]    [address] [place] [address]    Phone: [phone] Fax: [phone]    Open 24 Hours?: No    [facility] - [place], [place] - [address] AT PORTAL TO REGISTERED [facility] S[facility]    [address] [facility] [address]    Phone: [phone] Fax: [phone]    Open 24 Hours?: No    [id number] - [place], [place] - [address] AT [facility] & [address]    [address] [facility] [address]    Phone: [phone] Fax: [phone]    Open 24 Hours?: No      Medication refill instructions:       If your prescription bottle indicates you have medication refills left, it is not necessary to call your provider’s office. Please contact your pharmacy and they will refill your medication.    If your prescription bottle indicates you do not have any refills left, you may request refills at any time through one of the following ways: The online [employer] system (except Urgent Care), by calling your provider’s office, or by asking your pharmacy to contact your provider’s office with a refill request. Medication refills are processed only during regular business hours and may not be available until the next business day. Your provider may request additional information or to have a follow-up visit with you prior to refilling your medication.   *Please Note: Medication refills are assigned a new Rx number when refilled electronically. Your pharmacy may indicate that no refills were authorized even though a new prescription for the same medication is available at the pharmacy. Please request the medicine by name with the pharmacy before contacting your  provider for a refill.        Your To Do List     Future Labs/Procedures Complete By Expires    COMP METABOLIC PANEL  As directed 3/11/2018    HEMOGLOBIN A1C  As directed 3/11/2018    LIPID PROFILE  As directed 3/11/2018    MICROALBUMIN CREAT RATIO URINE  As directed 3/11/2018    TSH WITH REFLEX TO FT4  As directed 3/10/2018      Referral     A referral request has been sent to our patient care coordination department. Please allow 3-5 business days for us to process this request and contact you either by phone or mail. If you do not hear from us by the 5th business day, please call us at [phone].           [employer] Access Code: [email or web address]  Expires: 4/9/2017  4:01 PM    [employer]  A secure, online tool to manage your health information     [employer]’s [employer]® is a secure, online tool that connects you to your personalized health information from the privacy of your home -- day or night - making it very easy for you to manage your healthcare. Once the activation process is completed, you can even access your medical information using the [employer] bradley, which is available for free in the Apple Bradley store or Google Play store.     [employer] provides the following levels of access (as shown below):   My Chart Features   [facility] Doctor [facility]  Specialists [facility]  Urgent  Care Non-[facility]  Primary Care  Doctor   Email your healthcare team securely and privately 24/7 X X X    Manage appointments: schedule your next appointment; view details of past/upcoming appointments X      Request prescription refills. X      View recent personal medical records, including lab and immunizations X X X X   View health record, including health history, allergies, medications X X X X   Read reports about your outpatient visits, procedures, consult and ER notes X X X X   See your discharge summary, which is a recap of your hospital and/or ER visit that includes your diagnosis, lab results, and care plan. X X       How  to register for ReferralMD:  1. Go to  [email or web address].  2. Click on the Sign Up Now box, which takes you to the New Member Sign Up page. You will need to provide the following information:  a. Enter your ReferralMD Access Code exactly as it appears at the top of this page. (You will not need to use this code after you’ve completed the sign-up process. If you do not sign up before the expiration date, you must request a new code.)   b. Enter your date of birth.   c. Enter your home email address.   d. Click Submit, and follow the next screen’s instructions.  3. Create a ReferralMD ID. This will be your ReferralMD login ID and cannot be changed, so think of one that is secure and easy to remember.  4. Create a Brand[employer] password. You can change your password at any time.  5. Enter your Password Reset Question and Answer. This can be used at a later time if you forget your password.   6. Enter your e-mail address. This allows you to receive e-mail notifications when new information is available in ReferralMD.  7. Click Sign Up. You can now view your health information.    For assistance activating your ReferralMD account, call [phone]

## 2017-03-11 NOTE — ASSESSMENT & PLAN NOTE
Systolic blood pressure has been running over 150s.  He was down in the ER in [facility] in February and given fluids because of dehydration but his blood pressure was around 200 systolic.  Went to the /120, he went to the ER because of blood pressure, he was flushed and heavy head.  Having anxiety about blood pressure, was given librium about 20 years ago and that relaxed him.  Has been taking aleve a couple times a week, to help relax him for bed.  Using CPAP nightly.  Taking lisinopril 20 mg daily, HCTZ 25 mg daily, cartia 240 mg, atenolol 50 mg daily.  Feeling stressed over the blood pressure, so he is checking his blood pressure 5-7 times per day.

## 2017-03-11 NOTE — ASSESSMENT & PLAN NOTE
Patient is taking nasal decongestant, which is working and he was not aware that he need to stop after 3 days. Patient has been usingfor about 3 days. He tried Zyrtec but it caused drowsiness the next day.

## 2017-03-11 NOTE — PROGRESS NOTES
Subjective:   Primo Ca is a 76 y.o. male here today for hypertension, allergies    HTN (hypertension)  Systolic blood pressure has been running over 150s.  He was down in the ER in [facility] in February and given fluids because of dehydration but his blood pressure was around 200 systolic.  Went to the /120, he went to the ER because of blood pressure, he was flushed and heavy head.  Having anxiety about blood pressure, was given librium about 20 years ago and that relaxed him.  Has been taking aleve a couple times a week, to help relax him for bed.  Using CPAP nightly.  Taking lisinopril 20 mg daily, HCTZ 25 mg daily, cartia 240 mg, atenolol 50 mg daily.  Feeling stressed over the blood pressure, so he is checking his blood pressure 5-7 times per day.    Seasonal allergies  Patient is taking nasal decongestant, which is working and he was not aware that he need to stop after 3 days. Patient has been usingfor about 3 days. He tried Zyrtec but it caused drowsiness the next day.           Current medicines (including changes today)  Current Outpatient Prescriptions   Medication Sig Dispense Refill   • fluticasone (FLONASE) 50 MCG/ACT nasal spray Spray 2 Sprays in nose every day. 16 g 3   • diazepam (VALIUM) 2 MG Tab Take 1 Tab by mouth 1 time daily as needed for Anxiety. 30 Tab 1   • Cholecalciferol (VITAMIN D) 2000 UNITS Cap Take 2,000 Units by mouth every day.     • aspirin EC (ECOTRIN) 81 MG Tablet Delayed Response Take 81 mg by mouth every evening.     • naproxen (ALEVE) 220 MG tablet Take 440 mg by mouth as needed. Indications: Mild to Moderate Pain     • JANUMET  MG per tablet TAKE 1 TABLET TWICE DAILY  WITH MEALS 180 Tab 1   • atenolol (TENORMIN) 50 MG Tab TAKE 1 TABLET BY MOUTH TWICE DAILY 180 Tab 3   • CARTIA  MG CAPSULE SR 24 HR TAKE ONE CAPSULE BY MOUTH EVERY DAY 90 Cap 3   • lovastatin (MEVACOR) 20 MG Tab TAKE 3 TABLETS BY MOUTH EVERY  Tab 3   •  "hydrochlorothiazide (HYDRODIURIL) 25 MG Tab TAKE 1 TABLET BY MOUTH EVERY DAY 90 Tab 3   • lisinopril (PRINIVIL) 20 MG Tab TAKE 1 TABLET BY MOUTH TWICE DAILY 180 Tab 3   • Alpha-Lipoic Acid 600 MG CAPS Take 1 Cap by mouth every day.     • cyanocobalamin (VITAMIN B-12) 500 MCG TABS Take 500 mcg by mouth every day.     • docosahexanoic acid (FISH OIL) 1000 MG CAPS Take 1,000 mg by mouth every day.       No current facility-administered medications for this visit.     He  has a past medical history of Hypertension; Dyslipidemia; Gout; Vertigo; Obesity; Diabetes; and GERD (gastroesophageal reflux disease).    ROS   No chest pain, no shortness of breath       Objective:     Blood pressure 172/98, pulse 79, temperature 36.4 °C (97.5 °F), resp. rate 16, height 1.778 m (5' 10\"), weight 108.41 kg (239 lb), SpO2 94 %. Body mass index is 34.29 kg/(m^2).   Physical Exam:  Constitutional: Alert, no distress.  Skin: Warm, dry, good turgor, no rashes in visible areas.  Eye: Equal, round and reactive, conjunctiva clear, lids normal.  Respiratory: Unlabored respiratory effort, lungs clear to auscultation, no wheezes, no ronchi.  Cardiovascular: Normal S1, S2, no murmur, no edema.  Psych: Alert and oriented x3, normal affect and mood.        Assessment and Plan:   The following treatment plan was discussed    1. Essential hypertension  Uncontrolled but may be elevated because of stress. Advised patient to walk.  Continue current medication.  We will give her a short-term prescription for diazepam to see if we can break him out of this cycle of anxiety about his blood pressure.   Follow-up in 2 weeks to review twice daily blood pressure readings.    2. Anxiety about health  Short-term prescription for diazepam. If patient needs medication for longer period of time, we will consider an SSRI.  - diazepam (VALIUM) 2 MG Tab; Take 1 Tab by mouth 1 time daily as needed for Anxiety.  Dispense: 30 Tab; Refill: 1    3. Seasonal allergic " rhinitis due to pollen  Trial of Flonase.  - fluticasone (FLONASE) 50 MCG/ACT nasal spray; Spray 2 Sprays in nose every day.  Dispense: 16 g; Refill: 3    4. Obesity (BMI 35.0-39.9 without comorbidity) ([facility])  - REFERRAL TO [facility] IMPROVEMENT PROGRAMS (HIP) Services Requested:: Medicare Obesity Counseling; Reason for Visit:: Overweight/Obesity  - Patient identified as having weight management issue.  Appropriate orders and counseling given.    5. Type 2 diabetes, controlled, with neuropathy (CMS-HCC)  Check labs and follow up in approximately 2 weeks to discuss.  - COMP METABOLIC PANEL; Future  - HEMOGLOBIN A1C; Future  - MICROALBUMIN CREAT RATIO URINE; Future  - LIPID PROFILE; Future    6. Dyslipidemia  Check labs and follow up in approximately 2 weeks to discuss.  - TSH WITH REFLEX TO FT4; Future      Followup: Return in about 2 weeks (around 3/24/2017) for Diabetes, Short.

## 2017-03-13 ENCOUNTER — TELEPHONE (OUTPATIENT)
Dept: MEDICAL GROUP | Facility: MEDICAL CENTER | Age: 77
End: 2017-03-13

## 2017-03-13 LAB — EKG IMPRESSION: NORMAL

## 2017-03-13 RX ORDER — DIAZEPAM 2 MG/1
2 TABLET ORAL
Qty: 30 TAB | Refills: 1 | Status: SHIPPED
Start: 2017-03-13 | End: 2017-08-06

## 2017-03-13 NOTE — TELEPHONE ENCOUNTER
Pt left message stating his pharmacy never received Diazepam rx. Called pt and notified him we faxed rx this morning.

## 2017-03-14 ENCOUNTER — HOSPITAL ENCOUNTER (OUTPATIENT)
Dept: LAB | Facility: MEDICAL CENTER | Age: 77
End: 2017-03-14
Attending: FAMILY MEDICINE
Payer: MEDICARE

## 2017-03-14 DIAGNOSIS — E78.5 DYSLIPIDEMIA: ICD-10-CM

## 2017-03-14 DIAGNOSIS — E11.40 TYPE 2 DIABETES, CONTROLLED, WITH NEUROPATHY (HCC): ICD-10-CM

## 2017-03-14 LAB
ALBUMIN SERPL BCP-MCNC: 4.2 G/DL (ref 3.2–4.9)
ALBUMIN/GLOB SERPL: 1.3 G/DL
ALP SERPL-CCNC: 76 U/L (ref 30–99)
ALT SERPL-CCNC: 37 U/L (ref 2–50)
ANION GAP SERPL CALC-SCNC: 10 MMOL/L (ref 0–11.9)
AST SERPL-CCNC: 37 U/L (ref 12–45)
BILIRUB SERPL-MCNC: 0.7 MG/DL (ref 0.1–1.5)
BUN SERPL-MCNC: 13 MG/DL (ref 8–22)
CALCIUM SERPL-MCNC: 9.5 MG/DL (ref 8.5–10.5)
CHLORIDE SERPL-SCNC: 94 MMOL/L (ref 96–112)
CHOLEST SERPL-MCNC: 138 MG/DL (ref 100–199)
CO2 SERPL-SCNC: 27 MMOL/L (ref 20–33)
CREAT SERPL-MCNC: 1.01 MG/DL (ref 0.5–1.4)
CREAT UR-MCNC: 115.6 MG/DL
EST. AVERAGE GLUCOSE BLD GHB EST-MCNC: 154 MG/DL
GLOBULIN SER CALC-MCNC: 3.2 G/DL (ref 1.9–3.5)
GLUCOSE SERPL-MCNC: 140 MG/DL (ref 65–99)
HBA1C MFR BLD: 7 % (ref 0–5.6)
HDLC SERPL-MCNC: 41 MG/DL
LDLC SERPL CALC-MCNC: 49 MG/DL
MICROALBUMIN UR-MCNC: 12.5 MG/DL
MICROALBUMIN/CREAT UR: 108 MG/G (ref 0–30)
POTASSIUM SERPL-SCNC: 3.8 MMOL/L (ref 3.6–5.5)
PROT SERPL-MCNC: 7.4 G/DL (ref 6–8.2)
SODIUM SERPL-SCNC: 131 MMOL/L (ref 135–145)
TRIGL SERPL-MCNC: 239 MG/DL (ref 0–149)
TSH SERPL DL<=0.005 MIU/L-ACNC: 1.33 UIU/ML (ref 0.3–3.7)

## 2017-03-14 PROCEDURE — 80061 LIPID PANEL: CPT

## 2017-03-14 PROCEDURE — 83036 HEMOGLOBIN GLYCOSYLATED A1C: CPT | Mod: GA

## 2017-03-14 PROCEDURE — 82043 UR ALBUMIN QUANTITATIVE: CPT

## 2017-03-14 PROCEDURE — 82570 ASSAY OF URINE CREATININE: CPT

## 2017-03-14 PROCEDURE — 36415 COLL VENOUS BLD VENIPUNCTURE: CPT

## 2017-03-14 PROCEDURE — 84443 ASSAY THYROID STIM HORMONE: CPT

## 2017-03-14 PROCEDURE — 80053 COMPREHEN METABOLIC PANEL: CPT

## 2017-03-15 DIAGNOSIS — E11.40 TYPE 2 DIABETES, CONTROLLED, WITH NEUROPATHY (HCC): ICD-10-CM

## 2017-03-15 DIAGNOSIS — E66.9 OBESITY (BMI 30-39.9): ICD-10-CM

## 2017-03-24 ENCOUNTER — OFFICE VISIT (OUTPATIENT)
Dept: MEDICAL GROUP | Facility: MEDICAL CENTER | Age: 77
End: 2017-03-24
Payer: MEDICARE

## 2017-03-24 VITALS
SYSTOLIC BLOOD PRESSURE: 128 MMHG | RESPIRATION RATE: 16 BRPM | OXYGEN SATURATION: 100 % | HEIGHT: 70 IN | WEIGHT: 234 LBS | DIASTOLIC BLOOD PRESSURE: 86 MMHG | HEART RATE: 58 BPM | BODY MASS INDEX: 33.5 KG/M2 | TEMPERATURE: 97.6 F

## 2017-03-24 DIAGNOSIS — R97.20 ELEVATED PSA, LESS THAN 10 NG/ML: ICD-10-CM

## 2017-03-24 DIAGNOSIS — E11.40 TYPE 2 DIABETES, CONTROLLED, WITH NEUROPATHY (HCC): ICD-10-CM

## 2017-03-24 DIAGNOSIS — H91.91 DECREASED HEARING, RIGHT: ICD-10-CM

## 2017-03-24 DIAGNOSIS — I10 ESSENTIAL HYPERTENSION: ICD-10-CM

## 2017-03-24 PROBLEM — H91.90 DECREASED HEARING: Status: ACTIVE | Noted: 2017-03-24

## 2017-03-24 PROCEDURE — G8419 CALC BMI OUT NRM PARAM NOF/U: HCPCS | Performed by: FAMILY MEDICINE

## 2017-03-24 PROCEDURE — 1101F PT FALLS ASSESS-DOCD LE1/YR: CPT | Performed by: FAMILY MEDICINE

## 2017-03-24 PROCEDURE — G8484 FLU IMMUNIZE NO ADMIN: HCPCS | Performed by: FAMILY MEDICINE

## 2017-03-24 PROCEDURE — 1036F TOBACCO NON-USER: CPT | Performed by: FAMILY MEDICINE

## 2017-03-24 PROCEDURE — G8432 DEP SCR NOT DOC, RNG: HCPCS | Performed by: FAMILY MEDICINE

## 2017-03-24 PROCEDURE — 4040F PNEUMOC VAC/ADMIN/RCVD: CPT | Performed by: FAMILY MEDICINE

## 2017-03-24 PROCEDURE — 99214 OFFICE O/P EST MOD 30 MIN: CPT | Performed by: FAMILY MEDICINE

## 2017-03-24 RX ORDER — DILTIAZEM HYDROCHLORIDE 360 MG/1
360 CAPSULE, EXTENDED RELEASE ORAL
Qty: 90 CAP | Refills: 3 | Status: SHIPPED | OUTPATIENT
Start: 2017-03-24 | End: 2017-05-09

## 2017-03-24 RX ORDER — AMLODIPINE BESYLATE 2.5 MG/1
2.5 TABLET ORAL DAILY
Qty: 90 TAB | Refills: 3 | Status: SHIPPED | OUTPATIENT
Start: 2017-03-24 | End: 2017-03-24

## 2017-03-24 RX ORDER — INDOMETHACIN 50 MG/1
50 CAPSULE ORAL 3 TIMES DAILY
COMMUNITY
End: 2018-05-23

## 2017-03-24 NOTE — ASSESSMENT & PLAN NOTE
Patient has decreased hearing in right ear and usually wears a hearing aid. He would like to have follow-up with the audiologist, is requesting a referral.

## 2017-03-24 NOTE — PROGRESS NOTES
Subjective:   Primo Ca is a 76 y.o. male here today for hypertension, diabetes    Decreased hearing  Patient has decreased hearing in right ear and usually wears a hearing aid. He would like to have follow-up with the audiologist, is requesting a referral.    Elevated PSA, less than 10 ng/ml  Patient has had a slightly elevated PSA of around 4.5. He was seen by Dr. Rai, urologist, about 2 years ago and was diagnosed with BPH.    HTN (hypertension)  Patient's blood pressure has been elevated 140-160 over 80s to 95 at home over the last couple weeks. He is on atenolol 50 mg daily, diltiazem 240 mg daily, hydrochlorothiazide 25 mg daily, lisinopril 20 mg twice a day. He is having microalbuminuria. Patient discontinued taking Aleve regularly.    Type 2 diabetes, controlled, with neuropathy  Patient's A1c is slightly decreased since last labs in September from 6.2 down to 6.0.  He is on Janumet  milligrams twice a day.         Current medicines (including changes today)  Current Outpatient Prescriptions   Medication Sig Dispense Refill   • indomethacin (INDOCIN) 50 MG Cap Take 50 mg by mouth 3 times a day.     • diltiazem CD (CARDIZEM CD) 360 MG ER capsule Take 1 Cap by mouth every day. 90 Cap 3   • Cholecalciferol (VITAMIN D) 2000 UNITS Cap Take 2,000 Units by mouth every day.     • aspirin EC (ECOTRIN) 81 MG Tablet Delayed Response Take 81 mg by mouth every evening.     • JANUMET  MG per tablet TAKE 1 TABLET TWICE DAILY  WITH MEALS 180 Tab 1   • atenolol (TENORMIN) 50 MG Tab TAKE 1 TABLET BY MOUTH TWICE DAILY 180 Tab 3   • lovastatin (MEVACOR) 20 MG Tab TAKE 3 TABLETS BY MOUTH EVERY  Tab 3   • hydrochlorothiazide (HYDRODIURIL) 25 MG Tab TAKE 1 TABLET BY MOUTH EVERY DAY 90 Tab 3   • lisinopril (PRINIVIL) 20 MG Tab TAKE 1 TABLET BY MOUTH TWICE DAILY 180 Tab 3   • Alpha-Lipoic Acid 600 MG CAPS Take 1 Cap by mouth every day.     • cyanocobalamin (VITAMIN B-12) 500 MCG TABS Take 500 mcg  "by mouth every day.     • docosahexanoic acid (FISH OIL) 1000 MG CAPS Take 1,000 mg by mouth every day.     • diazepam (VALIUM) 2 MG Tab Take 1 Tab by mouth 1 time daily as needed for Anxiety. 30 Tab 1   • fluticasone (FLONASE) 50 MCG/ACT nasal spray Spray 2 Sprays in nose every day. 16 g 3     No current facility-administered medications for this visit.     He  has a past medical history of Hypertension; Dyslipidemia; Gout; Vertigo; Obesity; Diabetes; and GERD (gastroesophageal reflux disease).    ROS   No chest pain, no shortness of breath     Objective:     Blood pressure 128/86, pulse 58, temperature 36.4 °C (97.6 °F), resp. rate 16, height 1.778 m (5' 10\"), weight 106.142 kg (234 lb), SpO2 100 %. Body mass index is 33.58 kg/(m^2).   Physical Exam:  Constitutional: Alert, no distress.  Skin: Warm, dry, good turgor, no rashes in visible areas.  Eye: Equal, round and reactive, conjunctiva clear, lids normal.  Respiratory: Unlabored respiratory effort, lungs clear to auscultation, no wheezes, no ronchi.  Cardiovascular: Normal S1, S2, no murmur, no edema  Psych: Alert and oriented x3, normal affect and mood.        Assessment and Plan:   The following treatment plan was discussed    1. Essential hypertension  Increased diltiazem from 240 mg a 360 mg daily. Monitor blood pressure twice a day. Continue other medication. Call if blood pressure does not stay under 140/90.  - diltiazem CD (CARDIZEM CD) 360 MG ER capsule; Take 1 Cap by mouth every day.  Dispense: 90 Cap; Refill: 3    2. Type 2 diabetes, controlled, with neuropathy (CMS-HCC)  Controlled. Continue current medication. Follow-up in 3 months with labs, particularly microalbuminuria.  - COMP METABOLIC PANEL; Future  - HEMOGLOBIN A1C; Future  - MICROALBUMIN CREAT RATIO URINE; Future  - LIPID PROFILE; Future    3. Elevated PSA, less than 10 ng/ml  Check PSA in 3 months. Follow-up in clinic afterwards.  - PROSTATE SPECIFIC AG DIAGNOSTIC; Future    4. Decreased " hearing, right  - REFERRAL TO AUDIOLOGY      Followup: Return in about 3 months (around 6/24/2017) for Diabetes, Short.

## 2017-03-24 NOTE — ASSESSMENT & PLAN NOTE
Patient's blood pressure has been elevated 140-160 over 80s to 95 at home over the last couple weeks. He is on atenolol 50 mg daily, diltiazem 240 mg daily, hydrochlorothiazide 25 mg daily, lisinopril 20 mg twice a day. He is having microalbuminuria. Patient discontinued taking Aleve regularly.

## 2017-03-24 NOTE — ASSESSMENT & PLAN NOTE
Patient's A1c is slightly decreased since last labs in September from 6.2 down to 6.0.  He is on Janumet  milligrams twice a day.

## 2017-03-24 NOTE — MR AVS SNAPSHOT
"        Primo Gambino Lanny   3/24/2017 2:00 PM   Office Visit   MRN: [id number]    Department:  [facility]   Dept Phone:  [phone]    Description:  Male : 1940   Provider:  Jefferson Garcia M.D.           Reason for Visit     Follow-Up           Allergies as of 3/24/2017     Allergen Noted Reactions    Spironolactone 2016       diarrhea      You were diagnosed with     Essential hypertension   [7260421]       Type 2 diabetes, controlled, with neuropathy (CMS-[facility])   [509836]       Elevated PSA, less than 10 ng/ml   [[id number]]       Decreased hearing, right   [244154]         Vital Signs     Blood Pressure Pulse Temperature Respirations Height Weight    128/86 mmHg 58 36.4 °C (97.6 °F) 16 1.778 m (5' 10\") 106.142 kg (234 lb)    Body Mass Index Oxygen Saturation Smoking Status             33.58 kg/m2 100% Former Smoker         Basic Information     Date Of Birth Sex Race Ethnicity Preferred Language    1940 Male White Non- English      Your appointments     Mar 28, 2017 10:00 AM   ACUPUNCTURE 30 with Kal Huizar M.D.   [facility] (--)    [address] [employer]  [place] [address]   [phone]            2017 10:00 AM   ACUPUNCTURE 30 with Kal Huizar M.D.   [facility] (--)    [address]  [place] [address]   [phone]            Jul 10, 2017 10:00 AM   Established Patient with Jefferson Garcia M.D.   [facility] ([facility])    [address]  [place] [address]   [phone]           You will be receiving a confirmation call a few days before your appointment from our automated call confirmation system.              Problem List              ICD-10-CM Priority Class Noted - Resolved    Type 2 diabetes, controlled, with neuropathy (CMS-HCC) E11.40   10/19/2009 - Present    HTN (hypertension) I10   10/19/2009 - Present    Dyslipidemia " E78.5   10/19/2009 - Present    Chronic gout M1A.9XX0   10/19/2009 - Present    GERD (gastroesophageal reflux disease) K21.9   10/19/2009 - Present    Obesity (BMI 35.0-39.9 without comorbidity) (HCC) (Chronic) E66.9   Unknown - Present    Leg pain M79.606   1/16/2013 - Present    Seasonal allergies J30.2   2/10/2014 - Present    Cerebral microvascular disease I67.9   3/4/2014 - Present    Diabetic neuropathy, type II diabetes mellitus (CMS-HCC) E11.40   3/20/2014 - Present    Varicose veins of legs I83.93   3/20/2014 - Present    Actinic keratosis L57.0   3/20/2014 - Present    History of squamous cell carcinoma of skin Z85.828   3/20/2014 - Present    Diverticulosis of colon K57.30   8/22/2014 - Present    Neck pain on left side M54.2   10/30/2014 - Present    BPH (benign prostatic hyperplasia) N40.0   4/2/2015 - Present    ALEX on CPAP G47.33   4/29/2015 - Present    Cervical radiculopathy M54.12   10/8/2015 - Present    Peripheral neuropathy, idiopathic G60.9   10/8/2015 - Present    Primary osteoarthritis of both wrists M19.031, M19.032   3/1/2016 - Present    Absolute anemia D64.9   3/15/2016 - Present    Obesity (BMI 30-39.9) E66.9   9/22/2016 - Present    Elevated PSA, less than 10 ng/ml R97.20   3/24/2017 - Present    Decreased hearing H91.90   3/24/2017 - Present      Health Maintenance        Date Due Completion Dates    IMM DTaP/Tdap/Td Vaccine (1 - Tdap) 8/27/1959 ---    RETINAL SCREENING 4/22/2016 4/22/2015, 11/24/2014    IMM INFLUENZA (1) 9/1/2016 10/19/2009, 10/25/2007    A1C SCREENING 9/14/2017 3/14/2017, 9/16/2016, 2/29/2016, 10/19/2015, 3/17/2015, 10/23/2014, 2/3/2014, 11/4/2013, 5/24/2013, 10/15/2012, 4/5/2012, 11/14/2011    IMM PNEUMOCOCCAL 65+ (ADULT) LOW/MEDIUM RISK SERIES (2 of 2 - PPSV23) 9/22/2017 9/22/2016    DIABETES MONOFILAMENT / LE EXAM 9/22/2017 9/22/2016, 4/2/2015, 3/20/2014    FASTING LIPID PROFILE 3/14/2018 3/14/2017, 9/16/2016, 2/29/2016, 10/19/2015, 3/17/2015, 10/23/2014,  3/5/2014, 11/4/2013, 10/15/2012, 4/5/2012, 2/11/2011, 10/12/2009    URINE ACR / MICROALBUMIN 3/14/2018 3/14/2017, 9/16/2016, 2/29/2016, 10/19/2015, 3/17/2015, 10/23/2014, 2/3/2014, 4/5/2012    SERUM CREATININE 3/14/2018 3/14/2017, 3/8/2017, 9/16/2016, 5/9/2016, 4/11/2016, 2/29/2016, 11/26/2015, 10/19/2015, 4/28/2015, 4/26/2015, 3/17/2015, 10/23/2014, 3/4/2014, 2/3/2014, 1/15/2013, 12/9/2011, 2/11/2011, 10/12/2009    COLONOSCOPY 8/19/2024 8/19/2014            Current Immunizations     13-VALENT PCV PREVNAR 9/22/2016    INFLUENZA VACCINE H1N1 10/1/2013    Influenza TIV (IM) 10/19/2009    Influenza Vaccine Pediatric 10/25/2007    Pneumococcal Vaccine (UF)Historical Data 1/1/2010    SHINGLES VACCINE 10/19/2009      Below and/or attached are the medications your provider expects you to take. Review all of your home medications and newly ordered medications with your provider and/or pharmacist. Follow medication instructions as directed by your provider and/or pharmacist. Please keep your medication list with you and share with your provider. Update the information when medications are discontinued, doses are changed, or new medications (including over-the-counter products) are added; and carry medication information at all times in the event of emergency situations     Allergies:  SPIRONOLACTONE - (reactions not documented)               Medications  Valid as of: March 24, 2017 -  2:27 PM    Generic Name Brand Name Tablet Size Instructions for use    Alpha-Lipoic Acid (Cap) Alpha-Lipoic Acid 600 MG Take 1 Cap by mouth every day.        Aspirin (Tablet Delayed Response) ECOTRIN 81 MG Take 81 mg by mouth every evening.        Atenolol (Tab) TENORMIN 50 MG TAKE 1 TABLET BY MOUTH TWICE DAILY        Cholecalciferol (Cap) Vitamin D 2000 UNITS Take 2,000 Units by mouth every day.        Cyanocobalamin (Tab) VITAMIN B-12 500 MCG Take 500 mcg by mouth every day.        DiazePAM (Tab) VALIUM 2 MG Take 1 Tab by mouth 1 time daily  as needed for Anxiety.        DiltiaZEM HCl Coated Beads (CAPSULE SR 24 HR) CARDIZEM  MG Take 1 Cap by mouth every day.        Fluticasone Propionate (Suspension) FLONASE 50 MCG/ACT Spray 2 Sprays in nose every day.        HydroCHLOROthiazide (Tab) HYDRODIURIL 25 MG TAKE 1 TABLET BY MOUTH EVERY DAY        Indomethacin (Cap) INDOCIN 50 MG Take 50 mg by mouth 3 times a day.        Lisinopril (Tab) PRINIVIL 20 MG TAKE 1 TABLET BY MOUTH TWICE DAILY        Lovastatin (Tab) MEVACOR 20 MG TAKE 3 TABLETS BY MOUTH EVERY DAY        Omega-3 Fatty Acids (Cap) OMEGA 3 FA 1000 MG Take 1,000 mg by mouth every day.        SITagliptin-MetFORMIN HCl (Tab) JANUMET  MG TAKE 1 TABLET TWICE DAILY  WITH MEALS        .                 Medicines prescribed today were sent to:     [facility] [id number] [place] [address] [address] [facility] [id number] [facility] [address]    Phone: [phone] Fax: [phone]    Open 24 Hours?: No    [facility] - [place], [place] - [address] AT PORTAL TO REGISTERED [facility] SITES    [address] [facility] [address]    Phone: [phone] Fax: [phone]    Open 24 Hours?: No    [id number] - [place], [place] - [address] AT [facility] [address]    [address] [facility] [address]    Phone: [phone] Fax: [phone]    Open 24 Hours?: No      Medication refill instructions:       If your prescription bottle indicates you have medication refills left, it is not necessary to call your provider’s office. Please contact your pharmacy and they will refill your medication.    If your prescription bottle indicates you do not have any refills left, you may request refills at any time through one of the following ways: The online [employer] system (except Urgent Care), by calling your provider’s office, or by asking your pharmacy to contact your provider’s office with a refill request. Medication  refills are processed only during regular business hours and may not be available until the next business day. Your provider may request additional information or to have a follow-up visit with you prior to refilling your medication.   *Please Note: Medication refills are assigned a new Rx number when refilled electronically. Your pharmacy may indicate that no refills were authorized even though a new prescription for the same medication is available at the pharmacy. Please request the medicine by name with the pharmacy before contacting your provider for a refill.        Your To Do List     Future Labs/Procedures Complete By Expires    COMP METABOLIC PANEL  As directed 3/25/2018    HEMOGLOBIN A1C  As directed 3/25/2018    LIPID PROFILE  As directed 3/25/2018    MICROALBUMIN CREAT RATIO URINE  As directed 3/25/2018    PROSTATE SPECIFIC AG DIAGNOSTIC  As directed 3/24/2018      Referral     A referral request has been sent to our patient care coordination department. Please allow 3-5 business days for us to process this request and contact you either by phone or mail. If you do not hear from us by the 5th business day, please call us at [phone].           [employer] Access Code: Activation code not generated  Current [employer] Status: Active

## 2017-03-24 NOTE — ASSESSMENT & PLAN NOTE
Patient has had a slightly elevated PSA of around 4.5. He was seen by Dr. Rai, urologist, about 2 years ago and was diagnosed with BPH.

## 2017-03-26 ENCOUNTER — PATIENT MESSAGE (OUTPATIENT)
Dept: MEDICAL GROUP | Facility: MEDICAL CENTER | Age: 77
End: 2017-03-26

## 2017-03-27 NOTE — TELEPHONE ENCOUNTER
From: Primo Ca  To: Jefferson Garcia M.D.  Sent: 3/26/2017 8:45 PM PDT  Subject: Non-Urgent Medical Question    So I started the new prescription 3 days ago Dialtizem 360 up from 240 , and it making a difference in readings. Lower, but also have some slight side effects ie lip swelling, tired, not hungry, lower pulse today 45-50, that I would rather not deal with, and I would like to go back to 240 dosage until we return from [place] in 3 weeks. I will monitor daily and keep it below 150/90 and better. Readings appear to be fine for last 5 days. Sound like a plan ?

## 2017-03-28 ENCOUNTER — OFFICE VISIT (OUTPATIENT)
Dept: OTHER | Facility: IMAGING CENTER | Age: 77
End: 2017-03-28
Payer: MEDICARE

## 2017-03-28 DIAGNOSIS — M25.512 CHRONIC LEFT SHOULDER PAIN: Primary | ICD-10-CM

## 2017-03-28 DIAGNOSIS — M19.032 PRIMARY OSTEOARTHRITIS OF BOTH WRISTS: ICD-10-CM

## 2017-03-28 DIAGNOSIS — M19.031 PRIMARY OSTEOARTHRITIS OF BOTH WRISTS: ICD-10-CM

## 2017-03-28 DIAGNOSIS — G89.29 CHRONIC LEFT SHOULDER PAIN: Primary | ICD-10-CM

## 2017-03-28 DIAGNOSIS — M54.12 CERVICAL RADICULOPATHY: ICD-10-CM

## 2017-03-28 DIAGNOSIS — G60.9 PERIPHERAL NEUROPATHY, IDIOPATHIC: ICD-10-CM

## 2017-03-28 PROCEDURE — 99213 OFFICE O/P EST LOW 20 MIN: CPT | Mod: 25 | Performed by: FAMILY MEDICINE

## 2017-03-28 PROCEDURE — 97811 ACUP 1/> W/O ESTIM EA ADD 15: CPT | Performed by: FAMILY MEDICINE

## 2017-03-28 PROCEDURE — G8484 FLU IMMUNIZE NO ADMIN: HCPCS | Performed by: FAMILY MEDICINE

## 2017-03-28 PROCEDURE — 1101F PT FALLS ASSESS-DOCD LE1/YR: CPT | Performed by: FAMILY MEDICINE

## 2017-03-28 PROCEDURE — G8419 CALC BMI OUT NRM PARAM NOF/U: HCPCS | Performed by: FAMILY MEDICINE

## 2017-03-28 PROCEDURE — G8432 DEP SCR NOT DOC, RNG: HCPCS | Performed by: FAMILY MEDICINE

## 2017-03-28 PROCEDURE — 1036F TOBACCO NON-USER: CPT | Performed by: FAMILY MEDICINE

## 2017-03-28 PROCEDURE — 97813 ACUP 1/> W/ESTIM 1ST 15 MIN: CPT | Performed by: FAMILY MEDICINE

## 2017-03-28 PROCEDURE — 4040F PNEUMOC VAC/ADMIN/RCVD: CPT | Performed by: FAMILY MEDICINE

## 2017-03-28 NOTE — PROGRESS NOTES
"[facility] Progress Note  [address] [place] [address]  Dept: [phone]      Patient Name: Primo Ca   MRN: [id number]  YOB: 1940  PCP: Jefferson Garcia M.D.  Date of Service: 3/28/2017 10:03 AM    [facility] - SANDY  peripheral neuropathy, OA wrists bilaterally (lateral aspect), shoulder pain L      HPI L shoulder painful at top - feels like the middle of the shoulder.  ROM is preserved.  R wrist is bothering him  Lateral aspect.      Feet feel OK.  Back of his legs feel tight when he wakes up -0 improve after a walk.     Teeth are doing well -     His wife is on \"the How Not to Die\" book.       He's going to [place] on Thursdays - going to be gone for 3 weeks.     ROS     walks an hour 4x per week-   Seen by chiropractic in the past - states that this didn't help.   Favorite color - Red - Dark.  Just likes it. Wears brown and light colored clothes.    Favorite Season - likes the color of the fall .  Too hot in the summer.  90s is too hot.    For hobby / fun - travel.  In the states and overseas.  Likes to meet people, but really likes the history. Genealogy.   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    Retired at 55.    Didn't like the management.    4 years with the [employer].    Used to work in [place], [place]  Born in [place], [place] ? Early in the 2 AM.  Unknown.   R handed   PMH Past Medical History   Diagnosis Date   • Hypertension    • Dyslipidemia    • Gout    • Vertigo    • Obesity    • Diabetes    • GERD (gastroesophageal reflux disease)      Past Surgical History   Procedure Laterality Date   • Cholecystectomy         Social History     Social History   • Marital Status:      Spouse Name: Evelyn   • Number of Children: N/A   • Years of Education: N/A     Social History Main Topics   • Smoking status: Former Smoker -- 22 years     Types: Pipe     Start date: 04/01/1975     Quit date: 08/02/1981   • Smokeless tobacco: " Never Used   • Alcohol Use: No   • Drug Use: No   • Sexual Activity: No      Comment: , 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     • diltiazem CD (CARDIZEM CD) 360 MG ER capsule Take 1 Cap by mouth every day. 90 Cap 3   • diazepam (VALIUM) 2 MG Tab Take 1 Tab by mouth 1 time daily as needed for Anxiety. 30 Tab 1   • fluticasone (FLONASE) 50 MCG/ACT nasal spray Spray 2 Sprays in nose every day. 16 g 3   • Cholecalciferol (VITAMIN D) 2000 UNITS Cap Take 2,000 Units by mouth every day.     • aspirin EC (ECOTRIN) 81 MG Tablet Delayed Response Take 81 mg by mouth every evening.     • JANUMET  MG per tablet TAKE 1 TABLET TWICE DAILY  WITH MEALS 180 Tab 1   • atenolol (TENORMIN) 50 MG Tab TAKE 1 TABLET BY MOUTH TWICE DAILY 180 Tab 3   • lovastatin (MEVACOR) 20 MG Tab TAKE 3 TABLETS BY MOUTH EVERY  Tab 3   • hydrochlorothiazide (HYDRODIURIL) 25 MG Tab TAKE 1 TABLET BY MOUTH EVERY DAY 90 Tab 3   • lisinopril (PRINIVIL) 20 MG Tab TAKE 1 TABLET BY MOUTH TWICE DAILY 180 Tab 3   • Alpha-Lipoic Acid 600 MG CAPS Take 1 Cap by mouth every day.     • cyanocobalamin (VITAMIN B-12) 500 MCG TABS Take 500 mcg by mouth every day.     • docosahexanoic acid (FISH OIL) 1000 MG CAPS Take 1,000 mg by mouth every day.       No current facility-administered medications on file prior to visit.      ALLERGIES Allergies   Allergen Reactions   • Spironolactone      diarrhea      PE Pulses - not examined today  Tongue - not examined today     Assesment Eastern Stagnation SI, Yin Qi  BaZi Saavedra Water (Ángel), strength indeterminate (strong per preferences) need time of birth    Western No diagnosis found.               Plan Treatment matrix = Alternating  Set 1: LUE LU7-->++LU2, Lu1  Set 2: RUE  SI7-->SI8  Set 3: LLE Saavedra ankle x 4  Set 4: RUE Yin ankle x 4  Set 5: Weston Yin Edin Saavedra 3:6     15 min spent face to face, not including procedure  Kal Huizar M.D.

## 2017-03-28 NOTE — MR AVS SNAPSHOT
Primo Gambino Lanny   3/28/2017 10:00 AM   Office Visit   MRN: [id number]    Department:  Acupuncture Med   Dept Phone:  [phone]    Description:  Male : 1940   Provider:  Kal Huizar M.D.           Allergies as of 3/28/2017     Allergen Noted Reactions    Spironolactone 2016       diarrhea      You were diagnosed with     Chronic left shoulder pain   [004817]  -  Primary     Primary osteoarthritis of both wrists   [9620262]       Cervical radiculopathy   [553703]       Peripheral neuropathy, idiopathic   [356303]         Vital Signs     Smoking Status                   Former Smoker           Basic Information     Date Of Birth Sex Race Ethnicity Preferred Language    1940 Male White Non- English      Your appointments     2017 10:00 AM   ACUPUNCTURE 30 with Kal Huizar M.D.   [facility] Acupuncture and Integrative Medicine (--)    [address]  [facility] [id number] [phone]            Jul 10, 2017 10:00 AM   Established Patient with Jefferson Garcia M.D.   [facility]    [address]  [place] [address]   [phone]           You will be receiving a confirmation call a few days before your appointment from our automated call confirmation system.              Problem List              ICD-10-CM Priority Class Noted - Resolved    Type 2 diabetes, controlled, with neuropathy (CMS-HCC) E11.40   10/19/2009 - Present    HTN (hypertension) I10   10/19/2009 - Present    Dyslipidemia E78.5   10/19/2009 - Present    Chronic gout M1A.9XX0   10/19/2009 - Present    GERD (gastroesophageal reflux disease) K21.9   10/19/2009 - Present    Obesity (BMI 35.0-39.9 without comorbidity) (HCC) (Chronic) E66.9   Unknown - Present    Leg pain M79.606   2013 - Present    Seasonal allergies J30.2   2/10/2014 - Present    Cerebral microvascular disease I67.9   3/4/2014 - Present    Diabetic neuropathy, type II  diabetes mellitus (CMS-HCC) E11.40   3/20/2014 - Present    Varicose veins of legs I83.93   3/20/2014 - Present    Actinic keratosis L57.0   3/20/2014 - Present    History of squamous cell carcinoma of skin Z85.828   3/20/2014 - Present    Diverticulosis of colon K57.30   8/22/2014 - Present    Neck pain on left side M54.2   10/30/2014 - Present    BPH (benign prostatic hyperplasia) N40.0   4/2/2015 - Present    ALEX on CPAP G47.33   4/29/2015 - Present    Cervical radiculopathy M54.12   10/8/2015 - Present    Peripheral neuropathy, idiopathic G60.9   10/8/2015 - Present    Primary osteoarthritis of both wrists M19.031, M19.032   3/1/2016 - Present    Absolute anemia D64.9   3/15/2016 - Present    Obesity (BMI 30-39.9) E66.9   9/22/2016 - Present    Elevated PSA, less than 10 ng/ml R97.20   3/24/2017 - Present    Decreased hearing H91.90   3/24/2017 - Present      Health Maintenance        Date Due Completion Dates    IMM DTaP/Tdap/Td Vaccine (1 - Tdap) 8/27/1959 ---    RETINAL SCREENING 4/22/2016 4/22/2015, 11/24/2014    IMM INFLUENZA (1) 9/1/2016 10/19/2009, 10/25/2007    A1C SCREENING 9/14/2017 3/14/2017, 9/16/2016, 2/29/2016, 10/19/2015, 3/17/2015, 10/23/2014, 2/3/2014, 11/4/2013, 5/24/2013, 10/15/2012, 4/5/2012, 11/14/2011    IMM PNEUMOCOCCAL 65+ (ADULT) LOW/MEDIUM RISK SERIES (2 of 2 - PPSV23) 9/22/2017 9/22/2016    DIABETES MONOFILAMENT / LE EXAM 9/22/2017 9/22/2016, 4/2/2015, 3/20/2014    FASTING LIPID PROFILE 3/14/2018 3/14/2017, 9/16/2016, 2/29/2016, 10/19/2015, 3/17/2015, 10/23/2014, 3/5/2014, 11/4/2013, 10/15/2012, 4/5/2012, 2/11/2011, 10/12/2009    URINE ACR / MICROALBUMIN 3/14/2018 3/14/2017, 9/16/2016, 2/29/2016, 10/19/2015, 3/17/2015, 10/23/2014, 2/3/2014, 4/5/2012    SERUM CREATININE 3/14/2018 3/14/2017, 3/8/2017, 9/16/2016, 5/9/2016, 4/11/2016, 2/29/2016, 11/26/2015, 10/19/2015, 4/28/2015, 4/26/2015, 3/17/2015, 10/23/2014, 3/4/2014, 2/3/2014, 1/15/2013, 12/9/2011, 2/11/2011, 10/12/2009     COLONOSCOPY 8/19/2024 8/19/2014            Current Immunizations     13-VALENT PCV PREVNAR 9/22/2016    INFLUENZA VACCINE H1N1 10/1/2013    Influenza TIV (IM) 10/19/2009    Influenza Vaccine Pediatric 10/25/2007    Pneumococcal Vaccine (UF)Historical Data 1/1/2010    SHINGLES VACCINE 10/19/2009      Below and/or attached are the medications your provider expects you to take. Review all of your home medications and newly ordered medications with your provider and/or pharmacist. Follow medication instructions as directed by your provider and/or pharmacist. Please keep your medication list with you and share with your provider. Update the information when medications are discontinued, doses are changed, or new medications (including over-the-counter products) are added; and carry medication information at all times in the event of emergency situations     Allergies:  SPIRONOLACTONE - (reactions not documented)               Medications  Valid as of: March 28, 2017 - 11:00 AM    Generic Name Brand Name Tablet Size Instructions for use    Alpha-Lipoic Acid (Cap) Alpha-Lipoic Acid 600 MG Take 1 Cap by mouth every day.        Aspirin (Tablet Delayed Response) ECOTRIN 81 MG Take 81 mg by mouth every evening.        Atenolol (Tab) TENORMIN 50 MG TAKE 1 TABLET BY MOUTH TWICE DAILY        Cholecalciferol (Cap) Vitamin D 2000 UNITS Take 2,000 Units by mouth every day.        Cyanocobalamin (Tab) VITAMIN B-12 500 MCG Take 500 mcg by mouth every day.        DiazePAM (Tab) VALIUM 2 MG Take 1 Tab by mouth 1 time daily as needed for Anxiety.        DiltiaZEM HCl Coated Beads (CAPSULE SR 24 HR) CARDIZEM  MG Take 1 Cap by mouth every day.        Fluticasone Propionate (Suspension) FLONASE 50 MCG/ACT Spray 2 Sprays in nose every day.        HydroCHLOROthiazide (Tab) HYDRODIURIL 25 MG TAKE 1 TABLET BY MOUTH EVERY DAY        Indomethacin (Cap) INDOCIN 50 MG Take 50 mg by mouth 3 times a day.        Lisinopril (Tab) PRINIVIL 20 MG  TAKE 1 TABLET BY MOUTH TWICE DAILY        Lovastatin (Tab) MEVACOR 20 MG TAKE 3 TABLETS BY MOUTH EVERY DAY        Omega-3 Fatty Acids (Cap) OMEGA 3 FA 1000 MG Take 1,000 mg by mouth every day.        SITagliptin-MetFORMIN HCl (Tab) JANUMET  MG TAKE 1 TABLET TWICE DAILY  WITH MEALS        .                 Medicines prescribed today were sent to:     [id number] - [place], [place] - [address] AT [facility]    [address] [place] [address]    Phone: [phone] Fax: [phone]    Open 24 Hours?: No    [facility] - [place], [place] - [address] AT PORTAL TO [facility]    [address] [facility] [address]    Phone: [phone] Fax: [phone]    Open 24 Hours?: No    [id number] - [place], [place] - [address] [facility]    [address] [facility] [address]    Phone: [phone] Fax: [phone]    Open 24 Hours?: No      Medication refill instructions:       If your prescription bottle indicates you have medication refills left, it is not necessary to call your provider’s office. Please contact your pharmacy and they will refill your medication.    If your prescription bottle indicates you do not have any refills left, you may request refills at any time through one of the following ways: The online [employer] system (except Urgent Care), by calling your provider’s office, or by asking your pharmacy to contact your provider’s office with a refill request. Medication refills are processed only during regular business hours and may not be available until the next business day. Your provider may request additional information or to have a follow-up visit with you prior to refilling your medication.   *Please Note: Medication refills are assigned a new Rx number when refilled electronically. Your pharmacy may indicate that no refills were authorized even though a new prescription for the  same medication is available at the pharmacy. Please request the medicine by name with the pharmacy before contacting your provider for a refill.        Instructions    The side effects of Acupuncture needle insertion include: minor bruising, bleeding, or pain at the site of needle insertion.  If more worrisome symptoms, such as continued bleeding, severe bruising, or continue pain or altered sensation persist, please contact [facility] office @ [phone]            [employer] Access Code: Activation code not generated  Current Solutionreach Status: Active

## 2017-03-28 NOTE — PATIENT INSTRUCTIONS
The side effects of Acupuncture needle insertion include: minor bruising, bleeding, or pain at the site of needle insertion.  If more worrisome symptoms, such as continued bleeding, severe bruising, or continue pain or altered sensation persist, please contact [facility] office @ [phone]

## 2017-04-26 ENCOUNTER — OFFICE VISIT (OUTPATIENT)
Dept: OTHER | Facility: IMAGING CENTER | Age: 77
End: 2017-04-26
Payer: MEDICARE

## 2017-04-26 DIAGNOSIS — G60.9 PERIPHERAL NEUROPATHY, IDIOPATHIC: Primary | ICD-10-CM

## 2017-04-26 DIAGNOSIS — M19.031 PRIMARY OSTEOARTHRITIS OF BOTH WRISTS: ICD-10-CM

## 2017-04-26 DIAGNOSIS — M19.032 PRIMARY OSTEOARTHRITIS OF BOTH WRISTS: ICD-10-CM

## 2017-04-26 DIAGNOSIS — M25.512 ACUTE PAIN OF LEFT SHOULDER: ICD-10-CM

## 2017-04-26 PROCEDURE — 99213 OFFICE O/P EST LOW 20 MIN: CPT | Mod: 25 | Performed by: FAMILY MEDICINE

## 2017-04-26 PROCEDURE — 1101F PT FALLS ASSESS-DOCD LE1/YR: CPT | Performed by: FAMILY MEDICINE

## 2017-04-26 PROCEDURE — G8432 DEP SCR NOT DOC, RNG: HCPCS | Performed by: FAMILY MEDICINE

## 2017-04-26 PROCEDURE — 1036F TOBACCO NON-USER: CPT | Performed by: FAMILY MEDICINE

## 2017-04-26 PROCEDURE — G8419 CALC BMI OUT NRM PARAM NOF/U: HCPCS | Performed by: FAMILY MEDICINE

## 2017-04-26 PROCEDURE — 4040F PNEUMOC VAC/ADMIN/RCVD: CPT | Performed by: FAMILY MEDICINE

## 2017-04-26 NOTE — MR AVS SNAPSHOT
Primo Gambino Lanny   2017 10:00 AM   Office Visit   MRN: [id number]    Department:  Acupuncture Med   Dept Phone:  [phone]    Description:  Male : 1940   Provider:  Kal Huizar M.D.           Allergies as of 2017     Allergen Noted Reactions    Spironolactone 2016       diarrhea      Vital Signs     Smoking Status                   Former Smoker           Basic Information     Date Of Birth Sex Race Ethnicity Preferred Language    1940 Male White Non- English      Your appointments     May 16, 2017 10:30 AM   New Patient with Willow Jaimes RD   [facility])    [address]  [place] [id number] [phone]           It is the patient's responsibility to check with your Insurance for benefit coverage for visit / visits.  24 hours notice is required for all appointment changes or cancellation.  Please arrive 20 min. before your appointment time  Please bring the following with you: 1)Picture Id 2) Insurance card 3) Completed Forms if New Patient  If scheduled for DIABETES VISIT please also brin) Medications 2) Meter 3) Blood glucose logs 4) Any recent labs if you have them  If scheduled for NUTRITION VISIT please also brin) 2-3 days of detailed food intake logs 2) Blood glucose monitor and blood glucose logs (if you have them)            2017 10:00 AM   ACUPUNCTURE 30 with Kal Huizar M.D.   [facility] Acupuncture and Integrative Medicine (--)    [address]  [place] [address] [phone]            Jul 10, 2017 10:00 AM   Established Patient with Jefferson Garcia M.D.   [facility])    [address]  [place] [address] [phone]           You will be receiving a confirmation call a few days before your appointment from our automated call confirmation system.              Problem List              ICD-10-CM Priority Class Noted -  Resolved    Type 2 diabetes, controlled, with neuropathy (CMS-[facility]) E11.40   10/19/2009 - Present    HTN (hypertension) I10   10/19/2009 - Present    Dyslipidemia E78.5   10/19/2009 - Present    Chronic gout M1A.9XX0   10/19/2009 - Present    GERD (gastroesophageal reflux disease) K21.9   10/19/2009 - Present    Obesity (BMI 35.0-39.9 without comorbidity) (HCC) (Chronic) E66.9   Unknown - Present    Leg pain M79.606   1/16/2013 - Present    Seasonal allergies J30.2   2/10/2014 - Present    Cerebral microvascular disease I67.9   3/4/2014 - Present    Diabetic neuropathy, type II diabetes mellitus (CMS-[facility]) E11.40   3/20/2014 - Present    Varicose veins of legs I83.93   3/20/2014 - Present    Actinic keratosis L57.0   3/20/2014 - Present    History of squamous cell carcinoma of skin Z85.828   3/20/2014 - Present    Diverticulosis of colon K57.30   8/22/2014 - Present    Neck pain on left side M54.2   10/30/2014 - Present    BPH (benign prostatic hyperplasia) N40.0   4/2/2015 - Present    ALEX on CPAP G47.33   4/29/2015 - Present    Cervical radiculopathy M54.12   10/8/2015 - Present    Peripheral neuropathy, idiopathic G60.9   10/8/2015 - Present    Primary osteoarthritis of both wrists M19.031, M19.032   3/1/2016 - Present    Absolute anemia D64.9   3/15/2016 - Present    Obesity (BMI 30-39.9) E66.9   9/22/2016 - Present    Elevated PSA, less than 10 ng/ml R97.20   3/24/2017 - Present    Decreased hearing H91.90   3/24/2017 - Present      Health Maintenance        Date Due Completion Dates    IMM DTaP/Tdap/Td Vaccine (1 - Tdap) 8/27/1959 ---    RETINAL SCREENING 4/22/2016 4/22/2015, 11/24/2014    A1C SCREENING 9/14/2017 3/14/2017, 9/16/2016, 2/29/2016, 10/19/2015, 3/17/2015, 10/23/2014, 2/3/2014, 11/4/2013, 5/24/2013, 10/15/2012, 4/5/2012, 11/14/2011    IMM PNEUMOCOCCAL 65+ (ADULT) LOW/MEDIUM RISK SERIES (2 of 2 - PPSV23) 9/22/2017 9/22/2016    DIABETES MONOFILAMENT / LE EXAM 9/22/2017 9/22/2016, 4/2/2015, 3/20/2014     FASTING LIPID PROFILE 3/14/2018 3/14/2017, 9/16/2016, 2/29/2016, 10/19/2015, 3/17/2015, 10/23/2014, 3/5/2014, 11/4/2013, 10/15/2012, 4/5/2012, 2/11/2011, 10/12/2009    URINE ACR / MICROALBUMIN 3/14/2018 3/14/2017, 9/16/2016, 2/29/2016, 10/19/2015, 3/17/2015, 10/23/2014, 2/3/2014, 4/5/2012    SERUM CREATININE 3/14/2018 3/14/2017, 3/8/2017, 9/16/2016, 5/9/2016, 4/11/2016, 2/29/2016, 11/26/2015, 10/19/2015, 4/28/2015, 4/26/2015, 3/17/2015, 10/23/2014, 3/4/2014, 2/3/2014, 1/15/2013, 12/9/2011, 2/11/2011, 10/12/2009    COLONOSCOPY 8/19/2024 8/19/2014            Current Immunizations     13-VALENT PCV PREVNAR 9/22/2016    INFLUENZA VACCINE H1N1 10/1/2013    Influenza TIV (IM) 10/19/2009    Influenza Vaccine Pediatric 10/25/2007    Pneumococcal Vaccine (UF)Historical Data 1/1/2010    SHINGLES VACCINE 10/19/2009      Below and/or attached are the medications your provider expects you to take. Review all of your home medications and newly ordered medications with your provider and/or pharmacist. Follow medication instructions as directed by your provider and/or pharmacist. Please keep your medication list with you and share with your provider. Update the information when medications are discontinued, doses are changed, or new medications (including over-the-counter products) are added; and carry medication information at all times in the event of emergency situations     Allergies:  SPIRONOLACTONE - (reactions not documented)               Medications  Valid as of: April 26, 2017 - 11:08 AM    Generic Name Brand Name Tablet Size Instructions for use    Alpha-Lipoic Acid (Cap) Alpha-Lipoic Acid 600 MG Take 1 Cap by mouth every day.        Aspirin (Tablet Delayed Response) ECOTRIN 81 MG Take 81 mg by mouth every evening.        Atenolol (Tab) TENORMIN 50 MG TAKE 1 TABLET BY MOUTH TWICE DAILY        Cholecalciferol (Cap) Vitamin D 2000 UNITS Take 2,000 Units by mouth every day.        Cyanocobalamin (Tab) VITAMIN B-12 500 MCG  Take 500 mcg by mouth every day.        DiazePAM (Tab) VALIUM 2 MG Take 1 Tab by mouth 1 time daily as needed for Anxiety.        DilTIAZem HCl Coated Beads (CAPSULE SR 24 HR) CARDIZEM  MG Take 1 Cap by mouth every day.        Fluticasone Propionate (Suspension) FLONASE 50 MCG/ACT Spray 2 Sprays in nose every day.        HydroCHLOROthiazide (Tab) HYDRODIURIL 25 MG TAKE 1 TABLET BY MOUTH EVERY DAY        Indomethacin (Cap) INDOCIN 50 MG Take 50 mg by mouth 3 times a day.        Lisinopril (Tab) PRINIVIL 20 MG TAKE 1 TABLET BY MOUTH TWICE DAILY        Lovastatin (Tab) MEVACOR 20 MG TAKE 3 TABLETS BY MOUTH EVERY DAY        Omega-3 Fatty Acids (Cap) OMEGA 3 FA 1000 MG Take 1,000 mg by mouth every day.        SITagliptin-MetFORMIN HCl (Tab) JANUMET  MG TAKE 1 TABLET TWICE DAILY  WITH MEALS        .                 Medicines prescribed today were sent to:     [facility] [address] [place] [address] - [address] [facility] [address] [facility] [address] [place] [id number] [facility] [address]    Phone: [phone] Fax: [phone]    Open 24 Hours?: No    [facility] - [place], [place] - [address] AT [address]AL TO [facility] S[address]    [address] [facility] [address]    Phone: [phone] Fax: [phone]    Open 24 Hours?: No    [id number] - [place] [address] AT [facility] [address]    [facility] [address] [facility] [address]    Phone: [phone] Fax: [phone]    Open 24 Hours?: No      Medication refill instructions:       If your prescription bottle indicates you have medication refills left, it is not necessary to call your provider’s office. Please contact your pharmacy and they will refill your medication.    If your prescription bottle indicates you do not have any refills left, you may request refills at any time through one of the following ways: The online [employer] system (except Urgent Care), by calling your provider’s office,  or by asking your pharmacy to contact your provider’s office with a refill request. Medication refills are processed only during regular business hours and may not be available until the next business day. Your provider may request additional information or to have a follow-up visit with you prior to refilling your medication.   *Please Note: Medication refills are assigned a new Rx number when refilled electronically. Your pharmacy may indicate that no refills were authorized even though a new prescription for the same medication is available at the pharmacy. Please request the medicine by name with the pharmacy before contacting your provider for a refill.           [employer] Access Code: Activation code not generated  Current [employer]Plus Status: Active

## 2017-04-26 NOTE — PATIENT INSTRUCTIONS
The side effects of Acupuncture needle insertion include: minor bruising, bleeding, or pain at the site of needle insertion.  If more worrisome symptoms, such as continued bleeding, severe bruising, or continue pain or altered sensation persist, please contact [facility] office @ [phone]

## 2017-04-26 NOTE — PROGRESS NOTES
[facility] Acupuncture Progress Note  [address] [place] [address]  Dept: [phone]      Patient Name: Primo Ca   MRN: [id number]  YOB: 1940  PCP: Jefferson Garcia M.D.  Date of Service: 4/26/2017 10:03 AM    CC [facility]ado - SANDY  peripheral neuropathy, OA wrists R>L (lateral aspect), shoulder pain L      HPI L shoulder painful at the top - worse after he lifted a suitcase.    R wrist lateral is also painful.  L wrist is much improved.     Bilateral feet with neuropathy.     Back from [place] visiting his son.     Teeth are doing great.      Son in law may have a job in [employer].         ROS     walks an hour 4x per week-   Seen by chiropractic in the past - states that this didn't help.   Favorite color - Red - Dark.  Just likes it. Wears brown and light colored clothes.    Favorite Season - likes the color of the fall .  Too hot in the summer.  90s is too hot.    For hobby / fun - travel.  In the states and overseas.  Likes to meet people, but really likes the history. Genealogy.   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    Retired at 55.    Didn't like the management.    4 years with the [employer].    Used to work in [place], [place]  Born in [place], [place] ? Early in the 2 AM.  Unknown.   R handed   PMH Past Medical History   Diagnosis Date   • Hypertension    • Dyslipidemia    • Gout    • Vertigo    • Obesity    • Diabetes    • GERD (gastroesophageal reflux disease)      Past Surgical History   Procedure Laterality Date   • Cholecystectomy         Social History     Social History   • Marital Status:      Spouse Name: Evelyn   • Number of Children: N/A   • Years of Education: N/A     Social History Main Topics   • Smoking status: Former Smoker -- 22 years     Types: Pipe     Start date: 04/01/1975     Quit date: 08/02/1981   • Smokeless tobacco: Never Used   • Alcohol Use: No   • Drug Use: No   • Sexual Activity: No      Comment:  , 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     • diltiazem CD (CARDIZEM CD) 360 MG ER capsule Take 1 Cap by mouth every day. 90 Cap 3   • diazepam (VALIUM) 2 MG Tab Take 1 Tab by mouth 1 time daily as needed for Anxiety. 30 Tab 1   • fluticasone (FLONASE) 50 MCG/ACT nasal spray Spray 2 Sprays in nose every day. 16 g 3   • Cholecalciferol (VITAMIN D) 2000 UNITS Cap Take 2,000 Units by mouth every day.     • aspirin EC (ECOTRIN) 81 MG Tablet Delayed Response Take 81 mg by mouth every evening.     • JANUMET  MG per tablet TAKE 1 TABLET TWICE DAILY  WITH MEALS 180 Tab 1   • atenolol (TENORMIN) 50 MG Tab TAKE 1 TABLET BY MOUTH TWICE DAILY 180 Tab 3   • lovastatin (MEVACOR) 20 MG Tab TAKE 3 TABLETS BY MOUTH EVERY  Tab 3   • hydrochlorothiazide (HYDRODIURIL) 25 MG Tab TAKE 1 TABLET BY MOUTH EVERY DAY 90 Tab 3   • lisinopril (PRINIVIL) 20 MG Tab TAKE 1 TABLET BY MOUTH TWICE DAILY 180 Tab 3   • Alpha-Lipoic Acid 600 MG CAPS Take 1 Cap by mouth every day.     • cyanocobalamin (VITAMIN B-12) 500 MCG TABS Take 500 mcg by mouth every day.     • docosahexanoic acid (FISH OIL) 1000 MG CAPS Take 1,000 mg by mouth every day.       No current facility-administered medications on file prior to visit.      ALLERGIES Allergies   Allergen Reactions   • Spironolactone      diarrhea      PE Pulses - not examined today  Tongue - not examined today     Assesment Eastern Stagnation SI, Yin Qi  BaZi [place] Water (Ángel), strength indeterminate (strong per preferences) need time of birth    Western Encounter Diagnoses   Name Primary?   • Peripheral neuropathy, idiopathic Yes   • Primary osteoarthritis of both wrists    • Acute pain of left shoulder                   Plan Treatment matrix =  Alternating  Set 1: Bon FMS  Set 2: LUE LU7-->LU2 / LI6--> LI15 / SJ5-->SJ14  Set 3: RUE LI6-->LI5  Set 4: Neuropathy Tx without Electricity  Set 5: Ba Kai bilaterally     >15 min spent face to face, not including procedure.  >50% of the face to face time was spent in counseling and coordination. Topics discussed included:  Discussed natural course of peripheral neuropathy and the need for continued, intermittent treatment  Total acupuncture treatment time = 45 minutes    Kal Huizar M.D.

## 2017-05-08 ENCOUNTER — PATIENT MESSAGE (OUTPATIENT)
Dept: MEDICAL GROUP | Facility: MEDICAL CENTER | Age: 77
End: 2017-05-08

## 2017-05-09 RX ORDER — DILTIAZEM HYDROCHLORIDE 240 MG/1
240 CAPSULE, COATED, EXTENDED RELEASE ORAL
Qty: 90 CAP | Refills: 3 | Status: SHIPPED | OUTPATIENT
Start: 2017-05-09 | End: 2018-06-27 | Stop reason: SDUPTHER

## 2017-05-09 NOTE — TELEPHONE ENCOUNTER
From: Primo Ca  To: Jefferson Garcia M.D.  Sent: 5/8/2017 5:29 PM PDT  Subject: Prescription Question    Back on Dialzium 360 for a week now and same symptoms as before with heart rate in low 40s, swelling of lips and feet, tired; weak, and lathargic; lack of appetite, flushed when temp is warms up. Going back to 240 mg BUT thinking I had mild tolerant levels of the symptoms for years on 240 and tolerated it. Avg readings for past week 148/84 and 49 pulse rate. Readings before we left averaged below your recommended 150/90 when I switched back to 240 after taking 360 for a week back in [place].

## 2017-05-16 ENCOUNTER — APPOINTMENT (OUTPATIENT)
Dept: HEALTH INFORMATION MANAGEMENT | Facility: MEDICAL CENTER | Age: 77
End: 2017-05-16
Payer: MEDICARE

## 2017-05-22 RX ORDER — LISINOPRIL 20 MG/1
TABLET ORAL
Qty: 180 TAB | Refills: 3 | Status: SHIPPED | OUTPATIENT
Start: 2017-05-22 | End: 2018-05-23 | Stop reason: SDUPTHER

## 2017-06-02 RX ORDER — SITAGLIPTIN AND METFORMIN HYDROCHLORIDE 1000; 50 MG/1; MG/1
TABLET, FILM COATED ORAL
Qty: 180 TAB | Refills: 1 | Status: SHIPPED | OUTPATIENT
Start: 2017-06-02 | End: 2017-11-26 | Stop reason: SDUPTHER

## 2017-06-14 ENCOUNTER — OFFICE VISIT (OUTPATIENT)
Dept: OTHER | Facility: IMAGING CENTER | Age: 77
End: 2017-06-14
Payer: MEDICARE

## 2017-06-14 DIAGNOSIS — M19.032 PRIMARY OSTEOARTHRITIS OF BOTH WRISTS: ICD-10-CM

## 2017-06-14 DIAGNOSIS — M25.512 CHRONIC LEFT SHOULDER PAIN: Primary | ICD-10-CM

## 2017-06-14 DIAGNOSIS — G60.9 IDIOPATHIC PERIPHERAL NEUROPATHY: ICD-10-CM

## 2017-06-14 DIAGNOSIS — M19.031 PRIMARY OSTEOARTHRITIS OF BOTH WRISTS: ICD-10-CM

## 2017-06-14 DIAGNOSIS — G89.29 CHRONIC LEFT SHOULDER PAIN: Primary | ICD-10-CM

## 2017-06-14 PROCEDURE — 97811 ACUP 1/> W/O ESTIM EA ADD 15: CPT | Performed by: FAMILY MEDICINE

## 2017-06-14 PROCEDURE — 99213 OFFICE O/P EST LOW 20 MIN: CPT | Mod: 25 | Performed by: FAMILY MEDICINE

## 2017-06-14 PROCEDURE — 97810 ACUP 1/> WO ESTIM 1ST 15 MIN: CPT | Performed by: FAMILY MEDICINE

## 2017-06-14 NOTE — MR AVS SNAPSHOT
Primo Gambino Lanny   2017 10:00 AM   Office Visit   MRN: [id number]    Department:  Acupuncture Med   Dept Phone:  [phone]    Description:  Male : 1940   Provider:  Kal Huizar M.D.           Allergies as of 2017     Allergen Noted Reactions    Spironolactone 2016       diarrhea      You were diagnosed with     Chronic left shoulder pain   [644053]  -  Primary     Primary osteoarthritis of both wrists   [1962899]       Idiopathic peripheral neuropathy   [858147]         Vital Signs     Smoking Status                   Former Smoker           Basic Information     Date Of Birth Sex Race Ethnicity Preferred Language    1940 Male White Non- English      Your appointments     Jul 10, 2017 10:00 AM   Established Patient with Jefferson Garcia M.D.   [facility])    [address]  [place] [address] [phone]           You will be receiving a confirmation call a few days before your appointment from our automated call confirmation system.            Aug 02, 2017 10:00 AM   ACUPUNCTURE 30 with Kal Huizar M.D.   [facility] (--)    [address]  [place] [address] [phone]              Problem List              ICD-10-CM Priority Class Noted - Resolved    Type 2 diabetes, controlled, with neuropathy (CMS-HCC) E11.40   10/19/2009 - Present    HTN (hypertension) I10   10/19/2009 - Present    Dyslipidemia E78.5   10/19/2009 - Present    Chronic gout M1A.9XX0   10/19/2009 - Present    GERD (gastroesophageal reflux disease) K21.9   10/19/2009 - Present    Obesity (BMI 35.0-39.9 without comorbidity) (HCC) (Chronic) E66.9   Unknown - Present    Leg pain M79.606   2013 - Present    Seasonal allergies J30.2   2/10/2014 - Present    Cerebral microvascular disease I67.9   3/4/2014 - Present    Diabetic neuropathy, type II diabetes mellitus (CMS-HCC) E11.40   3/20/2014  - Present    Varicose veins of legs I83.93   3/20/2014 - Present    Actinic keratosis L57.0   3/20/2014 - Present    History of squamous cell carcinoma of skin Z85.828   3/20/2014 - Present    Diverticulosis of colon K57.30   8/22/2014 - Present    Neck pain on left side M54.2   10/30/2014 - Present    BPH (benign prostatic hyperplasia) N40.0   4/2/2015 - Present    ALEX on CPAP G47.33, Z99.89   4/29/2015 - Present    Cervical radiculopathy M54.12   10/8/2015 - Present    Peripheral neuropathy, idiopathic G60.9   10/8/2015 - Present    Primary osteoarthritis of both wrists M19.031, M19.032   3/1/2016 - Present    Absolute anemia D64.9   3/15/2016 - Present    Obesity (BMI 30-39.9) E66.9   9/22/2016 - Present    Elevated PSA, less than 10 ng/ml R97.20   3/24/2017 - Present    Decreased hearing H91.90   3/24/2017 - Present      Health Maintenance        Date Due Completion Dates    IMM DTaP/Tdap/Td Vaccine (1 - Tdap) 8/27/1959 ---    A1C SCREENING 9/14/2017 3/14/2017, 9/16/2016, 2/29/2016, 10/19/2015, 3/17/2015, 10/23/2014, 2/3/2014, 11/4/2013, 5/24/2013, 10/15/2012, 4/5/2012, 11/14/2011    IMM PNEUMOCOCCAL 65+ (ADULT) LOW/MEDIUM RISK SERIES (2 of 2 - PPSV23) 9/22/2017 9/22/2016    DIABETES MONOFILAMENT / LE EXAM 9/22/2017 9/22/2016, 4/2/2015, 3/20/2014    FASTING LIPID PROFILE 3/14/2018 3/14/2017, 9/16/2016, 2/29/2016, 10/19/2015, 3/17/2015, 10/23/2014, 3/5/2014, 11/4/2013, 10/15/2012, 4/5/2012, 2/11/2011, 10/12/2009    URINE ACR / MICROALBUMIN 3/14/2018 3/14/2017, 9/16/2016, 2/29/2016, 10/19/2015, 3/17/2015, 10/23/2014, 2/3/2014, 4/5/2012    SERUM CREATININE 3/14/2018 3/14/2017, 3/8/2017, 9/16/2016, 5/9/2016, 4/11/2016, 2/29/2016, 11/26/2015, 10/19/2015, 4/28/2015, 4/26/2015, 3/17/2015, 10/23/2014, 3/4/2014, 2/3/2014, 1/15/2013, 12/9/2011, 2/11/2011, 10/12/2009    RETINAL SCREENING 5/18/2018 5/18/2017, 4/22/2015, 11/24/2014    COLONOSCOPY 8/19/2024 8/19/2014            Current Immunizations     13-VALENT PCV PREVNAR  9/22/2016    INFLUENZA VACCINE H1N1 10/1/2013    Influenza TIV (IM) 10/19/2009    Influenza Vaccine Pediatric 10/25/2007    Pneumococcal Vaccine (UF)Historical Data 1/1/2010    SHINGLES VACCINE 10/19/2009      Below and/or attached are the medications your provider expects you to take. Review all of your home medications and newly ordered medications with your provider and/or pharmacist. Follow medication instructions as directed by your provider and/or pharmacist. Please keep your medication list with you and share with your provider. Update the information when medications are discontinued, doses are changed, or new medications (including over-the-counter products) are added; and carry medication information at all times in the event of emergency situations     Allergies:  SPIRONOLACTONE - (reactions not documented)               Medications  Valid as of: June 14, 2017 - 11:17 AM    Generic Name Brand Name Tablet Size Instructions for use    Alpha-Lipoic Acid (Cap) Alpha-Lipoic Acid 600 MG Take 1 Cap by mouth every day.        Aspirin (Tablet Delayed Response) ECOTRIN 81 MG Take 81 mg by mouth every evening.        Atenolol (Tab) TENORMIN 50 MG TAKE 1 TABLET BY MOUTH TWICE DAILY        Cholecalciferol (Cap) Vitamin D 2000 UNITS Take 2,000 Units by mouth every day.        Cyanocobalamin (Tab) VITAMIN B-12 500 MCG Take 500 mcg by mouth every day.        DiazePAM (Tab) VALIUM 2 MG Take 1 Tab by mouth 1 time daily as needed for Anxiety.        DilTIAZem HCl Coated Beads (CAPSULE SR 24 HR) CARDIZEM  MG Take 1 Cap by mouth every day.        Fluticasone Propionate (Suspension) FLONASE 50 MCG/ACT Spray 2 Sprays in nose every day.        HydroCHLOROthiazide (Tab) HYDRODIURIL 25 MG TAKE 1 TABLET BY MOUTH EVERY DAY        Indomethacin (Cap) INDOCIN 50 MG Take 50 mg by mouth 3 times a day.        Lisinopril (Tab) PRINIVIL 20 MG TAKE 1 TABLET BY MOUTH TWICE DAILY        Lovastatin (Tab) MEVACOR 20 MG TAKE 3 TABLETS BY  MOUTH EVERY DAY        Omega-3 Fatty Acids (Cap) OMEGA 3 FA 1000 MG Take 1,000 mg by mouth every day.        SITagliptin-MetFORMIN HCl (Tab) JANUMET  MG TAKE 1 TABLET TWICE DAILY  WITH MEALS        .                 Medicines prescribed today were sent to:     [id number] - [place], [place] - [address] AT [facility] [address] [facility] CREEK [facility]    [address] [place] [address]    Phone: [phone] Fax: [phone]    Open 24 Hours?: No    [facility] - [place], [place] - [address] AT PORTAL TO REGISTERED [facility] SITES    [address] [facility] [address]    Phone: [phone] Fax: [phone]    Open 24 Hours?: No    [id number] - [place], [place] - [address] [facility] [address]    [address] [facility] [address]    Phone: [phone] Fax: [phone]    Open 24 Hours?: No      Medication refill instructions:       If your prescription bottle indicates you have medication refills left, it is not necessary to call your provider’s office. Please contact your pharmacy and they will refill your medication.    If your prescription bottle indicates you do not have any refills left, you may request refills at any time through one of the following ways: The online [employer] system (except Urgent Care), by calling your provider’s office, or by asking your pharmacy to contact your provider’s office with a refill request. Medication refills are processed only during regular business hours and may not be available until the next business day. Your provider may request additional information or to have a follow-up visit with you prior to refilling your medication.   *Please Note: Medication refills are assigned a new Rx number when refilled electronically. Your pharmacy may indicate that no refills were authorized even though a new prescription for the same medication is available at the pharmacy. Please request the medicine by name with the  pharmacy before contacting your provider for a refill.        Instructions    The side effects of Acupuncture needle insertion include: minor bruising, bleeding, or pain at the site of needle insertion.  If more worrisome symptoms, such as continued bleeding, severe bruising, or continue pain or altered sensation persist, please contact [facility] office @ [phone]            [employer] Access Code: Activation code not generated  Current [employer] Status: Active

## 2017-06-14 NOTE — PATIENT INSTRUCTIONS
The side effects of Acupuncture needle insertion include: minor bruising, bleeding, or pain at the site of needle insertion.  If more worrisome symptoms, such as continued bleeding, severe bruising, or continue pain or altered sensation persist, please contact [facility] office @ [phone]

## 2017-07-05 ENCOUNTER — HOSPITAL ENCOUNTER (OUTPATIENT)
Dept: LAB | Facility: MEDICAL CENTER | Age: 77
End: 2017-07-05
Attending: FAMILY MEDICINE
Payer: MEDICARE

## 2017-07-05 DIAGNOSIS — E11.40 TYPE 2 DIABETES, CONTROLLED, WITH NEUROPATHY (HCC): ICD-10-CM

## 2017-07-05 DIAGNOSIS — R97.20 ELEVATED PSA, LESS THAN 10 NG/ML: ICD-10-CM

## 2017-07-05 LAB
ALBUMIN SERPL BCP-MCNC: 3.8 G/DL (ref 3.2–4.9)
ALBUMIN/GLOB SERPL: 1.2 G/DL
ALP SERPL-CCNC: 89 U/L (ref 30–99)
ALT SERPL-CCNC: 33 U/L (ref 2–50)
ANION GAP SERPL CALC-SCNC: 11 MMOL/L (ref 0–11.9)
AST SERPL-CCNC: 16 U/L (ref 12–45)
BILIRUB SERPL-MCNC: 0.5 MG/DL (ref 0.1–1.5)
BUN SERPL-MCNC: 12 MG/DL (ref 8–22)
CALCIUM SERPL-MCNC: 9 MG/DL (ref 8.5–10.5)
CHLORIDE SERPL-SCNC: 95 MMOL/L (ref 96–112)
CHOLEST SERPL-MCNC: 131 MG/DL (ref 100–199)
CO2 SERPL-SCNC: 27 MMOL/L (ref 20–33)
CREAT SERPL-MCNC: 0.88 MG/DL (ref 0.5–1.4)
CREAT UR-MCNC: 146.5 MG/DL
EST. AVERAGE GLUCOSE BLD GHB EST-MCNC: 166 MG/DL
GFR SERPL CREATININE-BSD FRML MDRD: >60 ML/MIN/1.73 M 2
GLOBULIN SER CALC-MCNC: 3.2 G/DL (ref 1.9–3.5)
GLUCOSE SERPL-MCNC: 120 MG/DL (ref 65–99)
HBA1C MFR BLD: 7.4 % (ref 0–5.6)
HDLC SERPL-MCNC: 34 MG/DL
LDLC SERPL CALC-MCNC: 60 MG/DL
MICROALBUMIN UR-MCNC: 14.9 MG/DL
MICROALBUMIN/CREAT UR: 102 MG/G (ref 0–30)
POTASSIUM SERPL-SCNC: 3.7 MMOL/L (ref 3.6–5.5)
PROT SERPL-MCNC: 7 G/DL (ref 6–8.2)
PSA SERPL-MCNC: 6.17 NG/ML (ref 0–4)
SODIUM SERPL-SCNC: 133 MMOL/L (ref 135–145)
TRIGL SERPL-MCNC: 185 MG/DL (ref 0–149)

## 2017-07-05 PROCEDURE — 82570 ASSAY OF URINE CREATININE: CPT

## 2017-07-05 PROCEDURE — 80061 LIPID PANEL: CPT

## 2017-07-05 PROCEDURE — 80053 COMPREHEN METABOLIC PANEL: CPT

## 2017-07-05 PROCEDURE — 36415 COLL VENOUS BLD VENIPUNCTURE: CPT

## 2017-07-05 PROCEDURE — 84153 ASSAY OF PSA TOTAL: CPT

## 2017-07-05 PROCEDURE — 82043 UR ALBUMIN QUANTITATIVE: CPT

## 2017-07-05 PROCEDURE — 83036 HEMOGLOBIN GLYCOSYLATED A1C: CPT | Mod: GA

## 2017-07-06 PROBLEM — C44.519 BASAL CELL CARCINOMA OF SKIN OF OTHER PART OF TRUNK: Status: ACTIVE | Noted: 2017-07-06

## 2017-07-08 RX ORDER — HYDROCHLOROTHIAZIDE 25 MG/1
TABLET ORAL
Qty: 90 TAB | Refills: 3 | Status: SHIPPED | OUTPATIENT
Start: 2017-07-08 | End: 2018-06-27 | Stop reason: SDUPTHER

## 2017-07-11 ENCOUNTER — OFFICE VISIT (OUTPATIENT)
Dept: MEDICAL GROUP | Facility: MEDICAL CENTER | Age: 77
End: 2017-07-11
Payer: MEDICARE

## 2017-07-11 VITALS
DIASTOLIC BLOOD PRESSURE: 74 MMHG | BODY MASS INDEX: 33.79 KG/M2 | SYSTOLIC BLOOD PRESSURE: 114 MMHG | RESPIRATION RATE: 16 BRPM | TEMPERATURE: 97.5 F | WEIGHT: 236 LBS | OXYGEN SATURATION: 96 % | HEART RATE: 61 BPM | HEIGHT: 70 IN

## 2017-07-11 DIAGNOSIS — G47.33 OSA ON CPAP: ICD-10-CM

## 2017-07-11 DIAGNOSIS — E66.9 OBESITY (BMI 35.0-39.9 WITHOUT COMORBIDITY): Chronic | ICD-10-CM

## 2017-07-11 DIAGNOSIS — E11.40 TYPE 2 DIABETES, CONTROLLED, WITH NEUROPATHY (HCC): ICD-10-CM

## 2017-07-11 DIAGNOSIS — I10 ESSENTIAL HYPERTENSION: ICD-10-CM

## 2017-07-11 DIAGNOSIS — M1A.0790 CHRONIC IDIOPATHIC GOUT INVOLVING TOE WITHOUT TOPHUS, UNSPECIFIED LATERALITY: ICD-10-CM

## 2017-07-11 DIAGNOSIS — E11.40 TYPE 2 DIABETES MELLITUS WITH DIABETIC NEUROPATHY, WITHOUT LONG-TERM CURRENT USE OF INSULIN (HCC): ICD-10-CM

## 2017-07-11 DIAGNOSIS — R97.20 ELEVATED PSA, LESS THAN 10 NG/ML: ICD-10-CM

## 2017-07-11 PROCEDURE — 99214 OFFICE O/P EST MOD 30 MIN: CPT | Performed by: FAMILY MEDICINE

## 2017-07-11 ASSESSMENT — PATIENT HEALTH QUESTIONNAIRE - PHQ9: CLINICAL INTERPRETATION OF PHQ2 SCORE: 0

## 2017-07-11 NOTE — PROGRESS NOTES
Subjective:   Primo Ca is a 76 y.o. male here today for diabetes    HTN (hypertension)  Average reading at home is less than 150/90.    Patient is compliant with CPAP. He has been using blood pressure medication as prescribed. Diltiazem 360 mg was causing significant side effects, lip swelling, tired, not hungry, lower pulse today 45-50.  We placed him back on diltiazem 240 mg, which she is tolerating well.    ALEX on CPAP  Patient has been compliant with CPAP. He brought records of his compliance and usage of his CPAP recordings from the machine. We have scanned into the chart. He states he feels more energy and improve quality of life with using CPAP.    Diabetic neuropathy, type II diabetes mellitus  Patient states that he feels like there is swelling under his feet. If he takes indomethacin at night before bed, the sensation resolves.    Type 2 diabetes, controlled, with neuropathy  Patient's A1c has increased from 7.0 up to 7.4. He denies any regular exercise, but is planning to start walking on his treadmill regularly. He is tolerating medication without any noticeable side effects.    Obesity (BMI 35.0-39.9 without comorbidity)  Patient is hoping to lose weight with regular exercise.    Elevated PSA, less than 10 ng/ml  Patient has history of slightly elevated PSA of over 4. His last PSA jumped up 1.65 points in the last year to now over 6. He saw urologist about 1.5 years ago, but has never had prostate biopsies.           Current medicines (including changes today)  Current Outpatient Prescriptions   Medication Sig Dispense Refill   • hydrochlorothiazide (HYDRODIURIL) 25 MG Tab TAKE 1 TABLET BY MOUTH EVERY DAY 90 Tab 3   • JANUMET  MG per tablet TAKE 1 TABLET TWICE DAILY  WITH MEALS 180 Tab 1   • lisinopril (PRINIVIL) 20 MG Tab TAKE 1 TABLET BY MOUTH TWICE DAILY 180 Tab 3   • diltiazem CD (CARTIA XT) 240 MG CAPSULE SR 24 HR Take 1 Cap by mouth every day. 90 Cap 3   • indomethacin  "(INDOCIN) 50 MG Cap Take 50 mg by mouth 3 times a day.     • diazepam (VALIUM) 2 MG Tab Take 1 Tab by mouth 1 time daily as needed for Anxiety. 30 Tab 1   • fluticasone (FLONASE) 50 MCG/ACT nasal spray Spray 2 Sprays in nose every day. 16 g 3   • Cholecalciferol (VITAMIN D) 2000 UNITS Cap Take 2,000 Units by mouth every day.     • aspirin EC (ECOTRIN) 81 MG Tablet Delayed Response Take 81 mg by mouth every evening.     • atenolol (TENORMIN) 50 MG Tab TAKE 1 TABLET BY MOUTH TWICE DAILY 180 Tab 3   • lovastatin (MEVACOR) 20 MG Tab TAKE 3 TABLETS BY MOUTH EVERY  Tab 3   • Alpha-Lipoic Acid 600 MG CAPS Take 1 Cap by mouth every day.     • cyanocobalamin (VITAMIN B-12) 500 MCG TABS Take 500 mcg by mouth every day.     • docosahexanoic acid (FISH OIL) 1000 MG CAPS Take 1,000 mg by mouth every day.       No current facility-administered medications for this visit.     He  has a past medical history of Hypertension; Dyslipidemia; Gout; Vertigo; Obesity; Diabetes; and GERD (gastroesophageal reflux disease).    ROS   No chest pain, no shortness of breath       Objective:     Blood pressure 114/74, pulse 61, temperature 36.4 °C (97.5 °F), resp. rate 16, height 1.778 m (5' 10\"), weight 107.049 kg (236 lb), SpO2 96 %. Body mass index is 33.86 kg/(m^2).   Physical Exam:  Constitutional: Alert, no distress.  Skin: Warm, dry, good turgor, no rashes in visible areas.  Eye: Equal, round and reactive, conjunctiva clear, lids normal.  Psych: Alert and oriented x3, normal affect and mood.    Monofilament testing with a 10 gram force: sensation intact: intact bilaterally  Visual Inspection: Feet without maceration, ulcers, fissures.  Pedal pulses: intact bilaterally      Assessment and Plan:   The following treatment plan was discussed    1. Type 2 diabetes, controlled, with neuropathy (CMS-HCC)  Slight increase in A1c. Advised diet and exercise. Follow up with labs in 4 months. Continue current medication.  - COMP METABOLIC " PANEL; Future  - HEMOGLOBIN A1C; Future  - MICROALBUMIN CREAT RATIO URINE; Future  - LIPID PROFILE; Future    2. Type 2 diabetes mellitus with diabetic neuropathy, without long-term current use of insulin (CMS-[facility])  Advised patient to do a trial of Aleve 1-2 tablets at night before bed instead of indomethacin, to avoid kidney damage was long-term use of indomethacin.    3. Essential hypertension  Controlled. Continue current medication.    4. ALEX on CPAP  Compliant and doing well. Continue CPAP.    5. Elevated PSA, less than 10 ng/ml  Referral to urology for evaluation.  - REFERRAL TO UROLOGY    6. Chronic idiopathic gout involving toe without tophus, unspecified laterality  - URIC ACID; Future    7. Obesity (BMI 35.0-39.9 without comorbidity) ([facility])  Advised diet and exercise.      Followup: Return in about 4 months (around 11/11/2017) for Diabetes, Short.

## 2017-07-11 NOTE — ASSESSMENT & PLAN NOTE
Patient has been compliant with CPAP. He brought records of his compliance and usage of his CPAP recordings from the machine. We have scanned into the chart. He states he feels more energy and improve quality of life with using CPAP.

## 2017-07-11 NOTE — MR AVS SNAPSHOT
"        Primo Gambino Lanny   2017 8:20 AM   Office Visit   MRN: [id number]    Department:  [facility]   Dept Phone:  [phone]    Description:  Male : 1940   Provider:  Jefferson Garcia M.D.           Reason for Visit     Follow-Up     Diabetes           Allergies as of 2017     Allergen Noted Reactions    Spironolactone 2016       diarrhea      You were diagnosed with     Type 2 diabetes, controlled, with neuropathy (CMS-HCC)   [088193]       Type 2 diabetes mellitus with diabetic neuropathy, without long-term current use of insulin (CMS-HCC)   [1351296]       Essential hypertension   [9924482]       ALEX on CPAP   [480867]       Elevated PSA, less than 10 ng/ml   [724156]       Chronic idiopathic gout involving toe without tophus, unspecified laterality   [1491207]         Vital Signs     Blood Pressure Pulse Temperature Respirations Height Weight    114/74 mmHg 61 36.4 °C (97.5 °F) 16 1.778 m (5' 10\") 107.049 kg (236 lb)    Body Mass Index Oxygen Saturation Smoking Status             33.86 kg/m2 96% Former Smoker         Basic Information     Date Of Birth Sex Race Ethnicity Preferred Language    1940 Male White Non- English      Your appointments     Aug 02, 2017 10:00 AM   ACUPUNCTURE 30 with Kal Huizar M.D.   [facility] (--)    [address] [place] [address] [phone]              Problem List              ICD-10-CM Priority Class Noted - Resolved    Type 2 diabetes, controlled, with neuropathy (CMS-HCC) E11.40   10/19/2009 - Present    HTN (hypertension) I10   10/19/2009 - Present    Dyslipidemia E78.5   10/19/2009 - Present    Chronic gout M1A.9XX0   10/19/2009 - Present    GERD (gastroesophageal reflux disease) K21.9   10/19/2009 - Present    Obesity (BMI 35.0-39.9 without comorbidity) (HCC) (Chronic) E66.9   Unknown - Present    Leg pain M79.606   2013 - Present    Seasonal allergies J30.2   " 2/10/2014 - Present    Cerebral microvascular disease I67.9   3/4/2014 - Present    Diabetic neuropathy, type II diabetes mellitus (CMS-HCC) E11.40   3/20/2014 - Present    Varicose veins of legs I83.93   3/20/2014 - Present    Actinic keratosis L57.0   3/20/2014 - Present    History of squamous cell carcinoma of skin Z85.828   3/20/2014 - Present    Diverticulosis of colon K57.30   8/22/2014 - Present    Neck pain on left side M54.2   10/30/2014 - Present    BPH (benign prostatic hyperplasia) N40.0   4/2/2015 - Present    ALEX on CPAP G47.33, Z99.89   4/29/2015 - Present    Cervical radiculopathy M54.12   10/8/2015 - Present    Peripheral neuropathy, idiopathic G60.9   10/8/2015 - Present    Primary osteoarthritis of both wrists M19.031, M19.032   3/1/2016 - Present    Absolute anemia D64.9   3/15/2016 - Present    Obesity (BMI 30-39.9) E66.9   9/22/2016 - Present    Elevated PSA, less than 10 ng/ml R97.20   3/24/2017 - Present    Decreased hearing H91.90   3/24/2017 - Present      Health Maintenance        Date Due Completion Dates    IMM DTaP/Tdap/Td Vaccine (1 - Tdap) 8/27/1959 ---    IMM INFLUENZA (1) 9/1/2017 10/19/2009, 10/25/2007    IMM PNEUMOCOCCAL 65+ (ADULT) LOW/MEDIUM RISK SERIES (2 of 2 - PPSV23) 9/22/2017 9/22/2016    DIABETES MONOFILAMENT / LE EXAM 9/22/2017 9/22/2016, 4/2/2015, 3/20/2014    A1C SCREENING 1/5/2018 7/5/2017, 3/14/2017, 9/16/2016, 2/29/2016, 10/19/2015, 3/17/2015, 10/23/2014, 2/3/2014, 11/4/2013, 5/24/2013, 10/15/2012, 4/5/2012, 11/14/2011    RETINAL SCREENING 5/18/2018 5/18/2017, 4/22/2015, 11/24/2014    FASTING LIPID PROFILE 7/5/2018 7/5/2017, 3/14/2017, 9/16/2016, 2/29/2016, 10/19/2015, 3/17/2015, 10/23/2014, 3/5/2014, 11/4/2013, 10/15/2012, 4/5/2012, 2/11/2011, 10/12/2009    URINE ACR / MICROALBUMIN 7/5/2018 7/5/2017, 3/14/2017, 9/16/2016, 2/29/2016, 10/19/2015, 3/17/2015, 10/23/2014, 2/3/2014, 4/5/2012    SERUM CREATININE 7/5/2018 7/5/2017, 3/14/2017, 3/8/2017, 9/16/2016, 5/9/2016,  4/11/2016, 2/29/2016, 11/26/2015, 10/19/2015, 4/28/2015, 4/26/2015, 3/17/2015, 10/23/2014, 3/4/2014, 2/3/2014, 1/15/2013, 12/9/2011, 2/11/2011, 10/12/2009    COLONOSCOPY 8/19/2024 8/19/2014            Current Immunizations     13-VALENT PCV PREVNAR 9/22/2016    INFLUENZA VACCINE H1N1 10/1/2013    Influenza TIV (IM) 10/19/2009    Influenza Vaccine Pediatric 10/25/2007    Pneumococcal Vaccine (UF)Historical Data 1/1/2010    SHINGLES VACCINE 10/19/2009      Below and/or attached are the medications your provider expects you to take. Review all of your home medications and newly ordered medications with your provider and/or pharmacist. Follow medication instructions as directed by your provider and/or pharmacist. Please keep your medication list with you and share with your provider. Update the information when medications are discontinued, doses are changed, or new medications (including over-the-counter products) are added; and carry medication information at all times in the event of emergency situations     Allergies:  SPIRONOLACTONE - (reactions not documented)               Medications  Valid as of: July 11, 2017 -  8:42 AM    Generic Name Brand Name Tablet Size Instructions for use    Alpha-Lipoic Acid (Cap) Alpha-Lipoic Acid 600 MG Take 1 Cap by mouth every day.        Aspirin (Tablet Delayed Response) ECOTRIN 81 MG Take 81 mg by mouth every evening.        Atenolol (Tab) TENORMIN 50 MG TAKE 1 TABLET BY MOUTH TWICE DAILY        Cholecalciferol (Cap) Vitamin D 2000 UNITS Take 2,000 Units by mouth every day.        Cyanocobalamin (Tab) VITAMIN B-12 500 MCG Take 500 mcg by mouth every day.        DiazePAM (Tab) VALIUM 2 MG Take 1 Tab by mouth 1 time daily as needed for Anxiety.        DilTIAZem HCl Coated Beads (CAPSULE SR 24 HR) CARDIZEM  MG Take 1 Cap by mouth every day.        Fluticasone Propionate (Suspension) FLONASE 50 MCG/ACT Spray 2 Sprays in nose every day.        HydroCHLOROthiazide (Tab)  HYDRODIURIL 25 MG TAKE 1 TABLET BY MOUTH EVERY DAY        Indomethacin (Cap) INDOCIN 50 MG Take 50 mg by mouth 3 times a day.        Lisinopril (Tab) PRINIVIL 20 MG TAKE 1 TABLET BY MOUTH TWICE DAILY        Lovastatin (Tab) MEVACOR 20 MG TAKE 3 TABLETS BY MOUTH EVERY DAY        Omega-3 Fatty Acids (Cap) OMEGA 3 FA 1000 MG Take 1,000 mg by mouth every day.        SITagliptin-MetFORMIN HCl (Tab) JANUMET  MG TAKE 1 TABLET TWICE DAILY  WITH MEALS        .                 Medicines prescribed today were sent to:     [id number] - [place], [place] - [address] [facility] [address] [facility] AT [facility] [address]REEK [facility]    [address] [facility] [address]    Phone: [phone] Fax: [phone]    Open 24 Hours?: No    [facility] - [place], [place] - [address] AT PORTAL TO [facility]    [address] [facility] [address]    Phone: [phone] Fax: [phone]    Open 24 Hours?: No    [id number] - [place], [place] - [address] AT [facility] [address]    [address] [facility] [address]    Phone: [phone] Fax: [phone]    Open 24 Hours?: No      Medication refill instructions:       If your prescription bottle indicates you have medication refills left, it is not necessary to call your provider’s office. Please contact your pharmacy and they will refill your medication.    If your prescription bottle indicates you do not have any refills left, you may request refills at any time through one of the following ways: The online [employer] system (except Urgent Care), by calling your provider’s office, or by asking your pharmacy to contact your provider’s office with a refill request. Medication refills are processed only during regular business hours and may not be available until the next business day. Your provider may request additional information or to have a follow-up visit with you prior to refilling your medication.   *Please Note: Medication  refills are assigned a new Rx number when refilled electronically. Your pharmacy may indicate that no refills were authorized even though a new prescription for the same medication is available at the pharmacy. Please request the medicine by name with the pharmacy before contacting your provider for a refill.        Your To Do List     Future Labs/Procedures Complete By Expires    COMP METABOLIC PANEL  As directed 7/12/2018    HEMOGLOBIN A1C  As directed 7/12/2018    LIPID PROFILE  As directed 7/12/2018    MICROALBUMIN CREAT RATIO URINE  As directed 7/12/2018    URIC ACID  As directed 7/12/2018      Referral     A referral request has been sent to our patient care coordination department. Please allow 3-5 business days for us to process this request and contact you either by phone or mail. If you do not hear from us by the 5th business day, please call us at [phone].           [employer] Access Code: Activation code not generated  Current [employer] Status: Active

## 2017-07-11 NOTE — ASSESSMENT & PLAN NOTE
Average reading at home is less than 150/90.    Patient is compliant with CPAP. He has been using blood pressure medication as prescribed. Diltiazem 360 mg was causing significant side effects, lip swelling, tired, not hungry, lower pulse today 45-50.  We placed him back on diltiazem 240 mg, which she is tolerating well.

## 2017-07-11 NOTE — ASSESSMENT & PLAN NOTE
Patient has history of slightly elevated PSA of over 4. His last PSA jumped up 1.65 points in the last year to now over 6. He saw urologist about 1.5 years ago, but has never had prostate biopsies.

## 2017-07-11 NOTE — ASSESSMENT & PLAN NOTE
Patient's A1c has increased from 7.0 up to 7.4. He denies any regular exercise, but is planning to start walking on his treadmill regularly. He is tolerating medication without any noticeable side effects.

## 2017-07-31 ENCOUNTER — PATIENT MESSAGE (OUTPATIENT)
Dept: MEDICAL GROUP | Facility: MEDICAL CENTER | Age: 77
End: 2017-07-31

## 2017-07-31 NOTE — TELEPHONE ENCOUNTER
From: Primo Ca  To: Jefferson Garcia M.D.  Sent: 7/31/2017 8:45 AM PDT  Subject: Non-Urgent Medical Question    I have had an annoying centralized pain in my back on the left side under and to the left of my wing which feels like in rib area down about midway down when touching. Heat and Aleve soothe it but it comes back. I have to see Dr Prince tomorrow and ask him about it. Do you think I will need X-ray of area ?

## 2017-08-03 ENCOUNTER — OFFICE VISIT (OUTPATIENT)
Dept: OTHER | Facility: IMAGING CENTER | Age: 77
End: 2017-08-03
Payer: MEDICARE

## 2017-08-03 DIAGNOSIS — M54.12 CERVICAL RADICULOPATHY: ICD-10-CM

## 2017-08-03 DIAGNOSIS — G60.9 PERIPHERAL NEUROPATHY, IDIOPATHIC: ICD-10-CM

## 2017-08-03 DIAGNOSIS — M19.032 PRIMARY OSTEOARTHRITIS OF BOTH WRISTS: Primary | ICD-10-CM

## 2017-08-03 DIAGNOSIS — M19.031 PRIMARY OSTEOARTHRITIS OF BOTH WRISTS: Primary | ICD-10-CM

## 2017-08-03 PROCEDURE — 97810 ACUP 1/> WO ESTIM 1ST 15 MIN: CPT | Performed by: FAMILY MEDICINE

## 2017-08-03 PROCEDURE — 97811 ACUP 1/> W/O ESTIM EA ADD 15: CPT | Performed by: FAMILY MEDICINE

## 2017-08-03 PROCEDURE — 99213 OFFICE O/P EST LOW 20 MIN: CPT | Mod: 25 | Performed by: FAMILY MEDICINE

## 2017-08-03 NOTE — PROGRESS NOTES
[facility] Medical Acupuncture Progress Note  [address] [place] [address]  Dept: [phone]      Patient Name: Primo Ca   MRN: [id number]  YOB: 1940  PCP: Jefferson Garcia M.D.  Date of Service: 8/3/2017  2:25 PM    CC Alvarado - SANDY  peripheral neuropathy, OA wrists R>L (lateral aspect), shoulder pain L .  Cervicalgia     HPI He's about to travel to [place] to fish and visit his grandkids.. He's looking forward to getting out of the heat in [place].  His daughter is moving to [place] with her family and selling their house in [place].     Last week, he had a pain in the L back - it was quite sore - it was going across the back.  He took some tylenol and this helped quite a bit.    L shoulder is still bothering him.     Tight neck.      R lateral wrist is a bit painful - he states that the other one is OK.     ROS walks an hour 4x per week-   Seen by chiropractic in the past - states that this didn't help.   Favorite color - Red - Dark.  Just likes it. Wears brown and light colored clothes.    Favorite Season - likes the color of the fall .  Too hot in the summer.  90s is too hot.    For hobby / fun - travel.  In the states and overseas.  Likes to meet people, but really likes the history. Genealogy.   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    Retired at 55.    Didn't like the management.    4 years with the [employer].    Used to work in [place], [place]  Born in [place], [place] ? Early in the 2 AM.  Unknown.   R handed   PMH Past Medical History   Diagnosis Date   • Hypertension    • Dyslipidemia    • Gout    • Vertigo    • Obesity    • Diabetes    • GERD (gastroesophageal reflux disease)      Past Surgical History   Procedure Laterality Date   • Cholecystectomy        SH Social History     Social History   • Marital Status:      Spouse Name: Evelyn   • Number of Children: N/A   • Years of Education: N/A     Social History Main Topics   • Smoking  status: Former Smoker -- 22 years     Types: Pipe     Start date: 04/01/1975     Quit date: 08/02/1981   • Smokeless tobacco: Never Used   • Alcohol Use: No   • Drug Use: No   • Sexual Activity: No      Comment: , 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 90 Cap 3   • indomethacin (INDOCIN) 50 MG Cap Take 50 mg by mouth 3 times a day.     • diazepam (VALIUM) 2 MG Tab Take 1 Tab by mouth 1 time daily as needed for Anxiety. 30 Tab 1   • fluticasone (FLONASE) 50 MCG/ACT nasal spray Spray 2 Sprays in nose every day. 16 g 3   • Cholecalciferol (VITAMIN D) 2000 UNITS Cap Take 2,000 Units by mouth every day.     • aspirin EC (ECOTRIN) 81 MG Tablet Delayed Response Take 81 mg by mouth every evening.     • atenolol (TENORMIN) 50 MG Tab TAKE 1 TABLET BY MOUTH TWICE DAILY 180 Tab 3   • lovastatin (MEVACOR) 20 MG Tab TAKE 3 TABLETS BY MOUTH EVERY  Tab 3   • Alpha-Lipoic Acid 600 MG CAPS Take 1 Cap by mouth every day.     • cyanocobalamin (VITAMIN B-12) 500 MCG TABS Take 500 mcg by mouth every day.     • docosahexanoic acid (FISH OIL) 1000 MG CAPS Take 1,000 mg by mouth every day.       No current facility-administered medications on file prior to visit.      ALLERGIES Allergies   Allergen Reactions   • Spironolactone      diarrhea      PE Pulses - not examined today  Tongue - not examined today     Assesment Eastern Stagnation YAMILA/ SI, Yin Qi  BaZi Saavedra Water (Ángel), strength indeterminate (strong per preferences) need time of  birth    Western Encounter Diagnoses   Name Primary?   • Primary osteoarthritis of both wrists Yes   • Cervical radiculopathy    • Peripheral neuropathy, idiopathic                   Plan Treatment matrix = Alternating  Set 1: RUE SI7-->Si5  Set 2: Weston Yin reymundo Saavedra 3:6  Set 3: RLE Yin Neuropathy Tx without Electricity  Set 4: LLE Saavedra Neuropathy Tx without electricity, Ankle x 2, BL59-->Bl40   Set 5: Ba Kai bilaterally     >15 min spent face to face, not including procedure.  >50% of the face to face time was spent in counseling and coordination. Topics discussed included:  Alvarado's travel plans and his method to keep his relationship with his wife renewed - separate vacations on occasion.  Discussed benefits of acupuncture for his peripheral neuropathy and the need to continue maintenance treatments of this for the most effect.   Total acupuncture treatment time = 45 minutes      Kal Huizar M.D.

## 2017-08-03 NOTE — MR AVS SNAPSHOT
Primo Gambino Lanny   8/3/2017 2:30 PM   Office Visit   MRN: [id number]    Department:  Acupuncture Med   Dept Phone:  [phone]    Description:  Male : 1940   Provider:  Kal Huizar M.D.           Allergies as of 8/3/2017     Allergen Noted Reactions    Spironolactone 2016       diarrhea      You were diagnosed with     Primary osteoarthritis of both wrists   [3308569]  -  Primary     Cervical radiculopathy   [[id number]]       Peripheral neuropathy, idiopathic   [[id number]]         Vital Signs     Smoking Status                   Former Smoker           Basic Information     Date Of Birth Sex Race Ethnicity Preferred Language    1940 Male White Non- English      Your appointments     Sep 20, 2017 10:00 AM   ACUPUNCTURE 30 with Kal Huizar M.D.   [facility] (--)    [address]  [facility] [address]   [phone]            2017 10:20 AM   Established Patient with Jefferson Garcia M.D.   [facility])    [address]  [facility] [address]   [phone]           You will be receiving a confirmation call a few days before your appointment from our automated call confirmation system.              Problem List              ICD-10-CM Priority Class Noted - Resolved    Type 2 diabetes, controlled, with neuropathy (CMS-HCC) E11.40   10/19/2009 - Present    HTN (hypertension) I10   10/19/2009 - Present    Dyslipidemia E78.5   10/19/2009 - Present    Chronic gout M1A.9XX0   10/19/2009 - Present    GERD (gastroesophageal reflux disease) K21.9   10/19/2009 - Present    Obesity (BMI 35.0-39.9 without comorbidity) (HCC) (Chronic) E66.9   Unknown - Present    Leg pain M79.606   2013 - Present    Seasonal allergies J30.2   2/10/2014 - Present    Cerebral microvascular disease I67.9   3/4/2014 - Present    Diabetic neuropathy, type II diabetes mellitus (CMS-HCC) E11.40   3/20/2014 -  Present    Varicose veins of legs I83.93   3/20/2014 - Present    Actinic keratosis L57.0   3/20/2014 - Present    History of squamous cell carcinoma of skin Z85.828   3/20/2014 - Present    Diverticulosis of colon K57.30   8/22/2014 - Present    Neck pain on left side M54.2   10/30/2014 - Present    BPH (benign prostatic hyperplasia) N40.0   4/2/2015 - Present    ALEX on CPAP G47.33, Z99.89   4/29/2015 - Present    Cervical radiculopathy M54.12   10/8/2015 - Present    Peripheral neuropathy, idiopathic G60.9   10/8/2015 - Present    Primary osteoarthritis of both wrists M19.031, M19.032   3/1/2016 - Present    Absolute anemia D64.9   3/15/2016 - Present    Obesity (BMI 30-39.9) E66.9   9/22/2016 - Present    Elevated PSA, less than 10 ng/ml R97.20   3/24/2017 - Present    Decreased hearing H91.90   3/24/2017 - Present      Health Maintenance        Date Due Completion Dates    IMM DTaP/Tdap/Td Vaccine (1 - Tdap) 8/27/1959 ---    IMM INFLUENZA (1) 9/1/2017 10/19/2009, 10/25/2007    IMM PNEUMOCOCCAL 65+ (ADULT) LOW/MEDIUM RISK SERIES (2 of 2 - PPSV23) 9/22/2017 9/22/2016    A1C SCREENING 1/5/2018 7/5/2017, 3/14/2017, 9/16/2016, 2/29/2016, 10/19/2015, 3/17/2015, 10/23/2014, 2/3/2014, 11/4/2013, 5/24/2013, 10/15/2012, 4/5/2012, 11/14/2011    FASTING LIPID PROFILE 7/5/2018 7/5/2017, 3/14/2017, 9/16/2016, 2/29/2016, 10/19/2015, 3/17/2015, 10/23/2014, 3/5/2014, 11/4/2013, 10/15/2012, 4/5/2012, 2/11/2011, 10/12/2009    URINE ACR / MICROALBUMIN 7/5/2018 7/5/2017, 3/14/2017, 9/16/2016, 2/29/2016, 10/19/2015, 3/17/2015, 10/23/2014, 2/3/2014, 4/5/2012    SERUM CREATININE 7/5/2018 7/5/2017, 3/14/2017, 3/8/2017, 9/16/2016, 5/9/2016, 4/11/2016, 2/29/2016, 11/26/2015, 10/19/2015, 4/28/2015, 4/26/2015, 3/17/2015, 10/23/2014, 3/4/2014, 2/3/2014, 1/15/2013, 12/9/2011, 2/11/2011, 10/12/2009    DIABETES MONOFILAMENT / LE EXAM 7/11/2018 7/11/2017, 9/22/2016, 4/2/2015, 3/20/2014    RETINAL SCREENING 7/24/2018 7/24/2017, 5/18/2017,  4/22/2015, 11/24/2014    COLONOSCOPY 8/19/2024 8/19/2014            Current Immunizations     13-VALENT PCV PREVNAR 9/22/2016    INFLUENZA VACCINE H1N1 10/1/2013    Influenza TIV (IM) 10/19/2009    Influenza Vaccine Pediatric 10/25/2007    Pneumococcal Vaccine (UF)Historical Data 1/1/2010    SHINGLES VACCINE 10/19/2009      Below and/or attached are the medications your provider expects you to take. Review all of your home medications and newly ordered medications with your provider and/or pharmacist. Follow medication instructions as directed by your provider and/or pharmacist. Please keep your medication list with you and share with your provider. Update the information when medications are discontinued, doses are changed, or new medications (including over-the-counter products) are added; and carry medication information at all times in the event of emergency situations     Allergies:  SPIRONOLACTONE - (reactions not documented)               Medications  Valid as of: August 03, 2017 -  3:29 PM    Generic Name Brand Name Tablet Size Instructions for use    Alpha-Lipoic Acid (Cap) Alpha-Lipoic Acid 600 MG Take 1 Cap by mouth every day.        Aspirin (Tablet Delayed Response) ECOTRIN 81 MG Take 81 mg by mouth every evening.        Atenolol (Tab) TENORMIN 50 MG TAKE 1 TABLET BY MOUTH TWICE DAILY        Cholecalciferol (Cap) Vitamin D 2000 UNITS Take 2,000 Units by mouth every day.        Cyanocobalamin (Tab) VITAMIN B-12 500 MCG Take 500 mcg by mouth every day.        DiazePAM (Tab) VALIUM 2 MG Take 1 Tab by mouth 1 time daily as needed for Anxiety.        DilTIAZem HCl Coated Beads (CAPSULE SR 24 HR) CARDIZEM  MG Take 1 Cap by mouth every day.        Fluticasone Propionate (Suspension) FLONASE 50 MCG/ACT Spray 2 Sprays in nose every day.        HydroCHLOROthiazide (Tab) HYDRODIURIL 25 MG TAKE 1 TABLET BY MOUTH EVERY DAY        Indomethacin (Cap) INDOCIN 50 MG Take 50 mg by mouth 3 times a day.         Lisinopril (Tab) PRINIVIL 20 MG TAKE 1 TABLET BY MOUTH TWICE DAILY        Lovastatin (Tab) MEVACOR 20 MG TAKE 3 TABLETS BY MOUTH EVERY DAY        Omega-3 Fatty Acids (Cap) OMEGA 3 FA 1000 MG Take 1,000 mg by mouth every day.        SITagliptin-MetFORMIN HCl (Tab) JANUMET  MG TAKE 1 TABLET TWICE DAILY  WITH MEALS        .                 Medicines prescribed today were sent to:     [id number] - [place], [place] - [address] AT [facility] [address] [facility]    [address] [place] [address]    Phone: [phone] Fax: [phone]    Open 24 Hours?: No    [facility] - [place], [place] - [address] AT PORTAL TO [facility]    [address] [place] [address]    Phone: [phone] Fax: [phone]    Open 24 Hours?: No    [id number] - [place], [place] - [address]    [address] [facility] [address]    Phone: [phone] Fax: [phone]    Open 24 Hours?: No      Medication refill instructions:       If your prescription bottle indicates you have medication refills left, it is not necessary to call your provider’s office. Please contact your pharmacy and they will refill your medication.    If your prescription bottle indicates you do not have any refills left, you may request refills at any time through one of the following ways: The online InferX system (except Urgent Care), by calling your provider’s office, or by asking your pharmacy to contact your provider’s office with a refill request. Medication refills are processed only during regular business hours and may not be available until the next business day. Your provider may request additional information or to have a follow-up visit with you prior to refilling your medication.   *Please Note: Medication refills are assigned a new Rx number when refilled electronically. Your pharmacy may indicate that no refills were authorized even  though a new prescription for the same medication is available at the pharmacy. Please request the medicine by name with the pharmacy before contacting your provider for a refill.        Instructions    The side effects of Acupuncture needle insertion include: minor bruising, bleeding, or pain at the site of needle insertion.  If more worrisome symptoms, such as continued bleeding, severe bruising, or continue pain or altered sensation persist, please contact [facility] office @ [phone]            CelePost Access Code: Activation code not generated  Current CelePost Status: Active

## 2017-08-03 NOTE — PATIENT INSTRUCTIONS
The side effects of Acupuncture needle insertion include: minor bruising, bleeding, or pain at the site of needle insertion.  If more worrisome symptoms, such as continued bleeding, severe bruising, or continue pain or altered sensation persist, please contact [facility] office @ [phone]

## 2017-08-04 ENCOUNTER — PATIENT OUTREACH (OUTPATIENT)
Dept: HEALTH INFORMATION MANAGEMENT | Facility: OTHER | Age: 77
End: 2017-08-04

## 2017-08-04 NOTE — PROGRESS NOTES
Attempt #:1    WebIZ Checked & Epic Updated: yes  HealthConnect Verified: no  Verify PCP: yes    Communication Preference Obtained: yes     Review Care Team: yes    Annual Wellness Visit Scheduling  1. Scheduling Status:Scheduled     Care Gap Scheduling (Attempt to Schedule EACH Overdue Care Gap!)     Health Maintenance Due   Topic Date Due   • IMM DTaP/Tdap/Td Vaccine (1 - Tdap) Scheduled   • Annual Wellness Visit  Scheduled         MyChart Activation: already active  Recochem Bradley: no  Virtual Visits: no  Opt In to Text Messages: no

## 2017-08-06 ENCOUNTER — HOSPITAL ENCOUNTER (EMERGENCY)
Facility: MEDICAL CENTER | Age: 77
End: 2017-08-06
Attending: EMERGENCY MEDICINE
Payer: MEDICARE

## 2017-08-06 VITALS
TEMPERATURE: 97.9 F | HEIGHT: 70 IN | DIASTOLIC BLOOD PRESSURE: 87 MMHG | WEIGHT: 234.79 LBS | HEART RATE: 57 BPM | RESPIRATION RATE: 16 BRPM | OXYGEN SATURATION: 97 % | SYSTOLIC BLOOD PRESSURE: 171 MMHG | BODY MASS INDEX: 33.61 KG/M2

## 2017-08-06 DIAGNOSIS — R42 VERTIGO: ICD-10-CM

## 2017-08-06 LAB — GLUCOSE BLD-MCNC: 151 MG/DL (ref 65–99)

## 2017-08-06 PROCEDURE — 99283 EMERGENCY DEPT VISIT LOW MDM: CPT

## 2017-08-06 PROCEDURE — A9270 NON-COVERED ITEM OR SERVICE: HCPCS | Performed by: EMERGENCY MEDICINE

## 2017-08-06 PROCEDURE — 82962 GLUCOSE BLOOD TEST: CPT

## 2017-08-06 PROCEDURE — 700102 HCHG RX REV CODE 250 W/ 637 OVERRIDE(OP): Performed by: EMERGENCY MEDICINE

## 2017-08-06 RX ORDER — MECLIZINE HYDROCHLORIDE 25 MG/1
25 TABLET ORAL 3 TIMES DAILY PRN
Qty: 30 TAB | Refills: 0 | Status: SHIPPED | OUTPATIENT
Start: 2017-08-06 | End: 2017-08-15 | Stop reason: SDUPTHER

## 2017-08-06 RX ORDER — MECLIZINE HYDROCHLORIDE 25 MG/1
25 TABLET ORAL ONCE
Status: COMPLETED | OUTPATIENT
Start: 2017-08-06 | End: 2017-08-06

## 2017-08-06 RX ADMIN — MECLIZINE HYDROCHLORIDE 25 MG: 25 TABLET ORAL at 07:44

## 2017-08-06 NOTE — ED PROVIDER NOTES
ED Provider Note    CHIEF COMPLAINT  Vertigo    HPI  Primo Ca is a 76 y.o. male who presents to the emergency department with complaint of vertigo. He states he's had a long-standing history of vertigo and has the same sensation currently. He woke up this morning, leaned over to put his socks on and felt the room was spinning. He states recently sat back up the symptoms abated. Now he is having intermittent symptoms when he is turning his head one way or the other. Denies headache, loss of sensation or strength to arms or legs, chest pain, fever, nausea, vomiting, palpitations. He has followed up with his ENT of proximal and 1.5 weeks ago and had the Epley maneuver completed for this with significant improvement of his symptoms. He is asking that we could do the Epley maneuver for him here today.    REVIEW OF SYSTEMS  Positives as above. Pertinent negatives include loss of sensation or strength to arms or legs, nausea, vomiting, vision changes, hearing changes  All other review of systems are negative    PAST MEDICAL HISTORY  Past Medical History   Diagnosis Date   • Hypertension    • Dyslipidemia    • Gout    • Vertigo    • Obesity    • Diabetes    • GERD (gastroesophageal reflux disease)        FAMILY HISTORY  Noncontributory    SOCIAL HISTORY  Social History     Social History   • Marital Status:      Spouse Name: Evelyn   • Number of Children: N/A   • Years of Education: N/A     Social History Main Topics   • Smoking status: Former Smoker -- 22 years     Types: Pipe     Start date: 04/01/1975     Quit date: 08/02/1981   • Smokeless tobacco: Never Used   • Alcohol Use: No   • Drug Use: No   • Sexual Activity: No      Comment: , 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, retired government official     Other Topics Concern   • None     Social History Narrative       SURGICAL HISTORY  Past Surgical History   Procedure Laterality Date   • Cholecystectomy         CURRENT MEDICATIONS  Home Medications     Reviewed by  "Lillian Campbell (Pharmacy Tech) on 08/06/17 at 0738  Med List Status: Complete    Medication Last Dose Status    Alpha-Lipoic Acid 600 MG CAPS 8/5/2017 Active    aspirin EC (ECOTRIN) 81 MG Tablet Delayed Response 8/5/2017 Active    atenolol (TENORMIN) 50 MG Tab 8/6/2017 Active    Cholecalciferol (VITAMIN D) 2000 UNITS Cap 8/6/2017 Active    cyanocobalamin (VITAMIN B-12) 500 MCG TABS 8/6/2017 Active    diltiazem CD (CARTIA XT) 240 MG CAPSULE SR 24 HR 8/5/2017 Active    docosahexanoic acid (FISH OIL) 1000 MG CAPS 8/6/2017 Active    fluticasone (FLONASE) 50 MCG/ACT nasal spray 8/5/2017 Active    hydrochlorothiazide (HYDRODIURIL) 25 MG Tab 8/6/2017 Active    indomethacin (INDOCIN) 50 MG Cap 8/6/2017 Active    JANUMET  MG per tablet 8/6/2017 Active    lisinopril (PRINIVIL) 20 MG Tab 8/6/2017 Active    lovastatin (MEVACOR) 20 MG Tab 8/5/2017 Active                ALLERGIES  Allergies   Allergen Reactions   • Spironolactone      diarrhea       PHYSICAL EXAM  VITAL SIGNS: /87 mmHg  Pulse 57  Temp(Src) 36.6 °C (97.9 °F)  Resp 16  Ht 1.778 m (5' 10\")  Wt 106.5 kg (234 lb 12.6 oz)  BMI 33.69 kg/m2  SpO2 97%     Constitutional: Well developed, Well nourished, No acute distress, Non-toxic appearance.   Eyes: PERRLA, EOMI, Conjunctiva normal, No discharge.   HENT: Tympanic membranes are dull bilaterally, there is no excessive cerumen  Cardiovascular: Normal heart rate, Normal rhythm, No murmurs, No rubs, No gallops, and intact distal pulses.   Thorax & Lungs:  No respiratory distress, no rales, no rhonchi, No wheezing, No chest wall tenderness.   Abdomen: Bowel sounds normal, Soft, No tenderness, No guarding, No rebound, No pulsatile masses.   Skin: Warm, Dry, No erythema, No rash.   Extremities: Full range of motion, no deformity, no edema.  Neurologic:  Alert & oriented to month and age, Normal cognition, Cranial nerves II-XII are intact, No slurred speech, Negative finger to nose bilaterally, No " pronator drift bilaterally,   strength 5/5 bilaterally, Leg raise strength 5/5 bilaterally, Plantarflexion strength 5/5 bilaterally, Dorsiflexion strength 5/5 bilaterally, Deep tendon reflexes 2/4 upper and lower extremities bilaterally, Sensation intact throughout, positive Adam-Hallpike maneuver with nystagmus to the right  Psychiatric: Affect normal for clinical presentation.       COURSE & MEDICAL DECISION MAKING  Pertinent Labs & Imaging studies reviewed. (See chart for details)  This is a charming 76-year-old male with previous history of vertigo states that he is having positional vertigo well looking down. The patient states that this time he is much better since coming to the hospital. He has a normal Accu-Chek. Secondary to his chronicity of the condition I did not do an MRI or CT scan of the brain. The patient has no focal neurological deficits. He had a positive Adam-Hallpike maneuver I followed this with the Epley maneuver yesterday with significant improvement. The patient also received 1 tab of Antivert and a prescription for the same. Driving restrictions have been given. I do not believe the patient is experiencing intracranial lesion such as subarachnoid hemorrhage, subdural hematoma, epidural hematoma, cavernous sinus thrombosis, venous thrombosis or masslike lesion. The patient was am waiting in the emergency department without difficulty, states he felt much better and went home. I have asked that he return to [facility] if he has increasing symptoms as B do not have neurosurgery or neurology on call this facility.    Discharge Medication List as of 8/6/2017  8:24 AM      START taking these medications    Details   meclizine (ANTIVERT) 25 MG Tab Take 1 Tab by mouth 3 times a day as needed (vertigo). No driving, no drinking alcohol., Disp-30 Tab, R-0, Print Rx Paper             FINAL IMPRESSION     1. Vertigo      I reviewed prescription monitoring program for patient's  narcotic use before prescribing a scheduled drug.The patient will not drink alcohol nor drive with prescribed medications. The patient will return for new or worsening symptoms and is stable at the time of discharge.    The patient is referred to a primary physician for blood pressure management, diabetic screening, and for all other preventative health concerns.    DISPOSITION:  Patient will be discharged home in stable condition.    FOLLOW UP:  [facility], Emergency Dept  [address]  [place] [address]  [phone]    If symptoms worsen    Jefferson Garcia M.D.  [id number]  [facility] [address]  [phone]    Schedule an appointment as soon as possible for a visit in 1 week        OUTPATIENT MEDICATIONS:  Discharge Medication List as of 8/6/2017  8:24 AM      START taking these medications    Details   meclizine (ANTIVERT) 25 MG Tab Take 1 Tab by mouth 3 times a day as needed (vertigo). No driving, no drinking alcohol., Disp-30 Tab, R-0, Print Rx Paper                      Electronically signed by: Patrick Ramirez, 8/6/2017 7:28 AM

## 2017-08-06 NOTE — ED AVS SNAPSHOT
[employer] Access Code: Activation code not generated  Current [employer] Status: Active    [employer]  A secure, online tool to manage your health information     [employer]’s [employer]® is a secure, online tool that connects you to your personalized health information from the privacy of your home -- day or night - making it very easy for you to manage your healthcare. Once the activation process is completed, you can even access your medical information using the [employer] bradley, which is available for free in the Apple Bradley store or Google Play store.     [employer] provides the following levels of access (as shown below):   My Chart Features   [facility] Primary Care Doctor [facility]  Specialists [facility]  Urgent  Care Non-[facility]  Primary Care  Doctor   Email your healthcare team securely and privately 24/7 X X X X   Manage appointments: schedule your next appointment; view details of past/upcoming appointments X      Request prescription refills. X      View recent personal medical records, including lab and immunizations X X X X   View health record, including health history, allergies, medications X X X X   Read reports about your outpatient visits, procedures, consult and ER notes X X X X   See your discharge summary, which is a recap of your hospital and/or ER visit that includes your diagnosis, lab results, and care plan. X X       How to register for [employer]:  1. Go to  [email or web address].  2. Click on the Sign Up Now box, which takes you to the New Member Sign Up page. You will need to provide the following information:  a. Enter your [employer] Access Code exactly as it appears at the top of this page. (You will not need to use this code after you’ve completed the sign-up process. If you do not sign up before the expiration date, you must request a new code.)   b. Enter your date of birth.   c. Enter your home email address.   d. Click Submit, and follow the next screen’s instructions.  3. Create a [employer] ID. This will  be your [employer] login ID and cannot be changed, so think of one that is secure and easy to remember.  4. Create a [employer] password. You can change your password at any time.  5. Enter your Password Reset Question and Answer. This can be used at a later time if you forget your password.   6. Enter your e-mail address. This allows you to receive e-mail notifications when new information is available in [employer].  7. Click Sign Up. You can now view your health information.    For assistance activating your [employer] account, call [phone]

## 2017-08-06 NOTE — ED AVS SNAPSHOT
8/6/2017    Primojasmeet Gambino Lanny  [address]  [facility] [address]    Dear Primo:    [facility] wants to ensure your discharge home is safe and you or your loved ones have had all of your questions answered regarding your care after you leave the hospital.    Below is a list of resources and contact information should you have any questions regarding your hospital stay, follow-up instructions, or active medical symptoms.    Questions or Concerns Regarding… Contact   Medical Questions Related to Your Discharge  (7 days a week, 8am-5pm) Contact a Nurse Care Coordinator   [phone]   Medical Questions Not Related to Your Discharge  (24 hours a day / 7 days a week)  Contact the Nurse Health Line   [phone]    Medications or Discharge Instructions Refer to your discharge packet   or contact your [facility] Primary Care Provider   [phone]   Follow-up Appointment(s) Schedule your appointment via Sprinklr   or contact Scheduling [phone]   Billing Review your statement via Sprinklr  or contact Billing [phone]   Medical Records Review your records via Sprinklr   or contact Medical Records [phone]     You may receive a telephone call within two days of discharge. This call is to make certain you understand your discharge instructions and have the opportunity to have any questions answered. You can also easily access your medical information, test results and upcoming appointments via the Sprinklr free online health management tool. You can learn more and sign up at [email or web address]/Sprinklr. For assistance setting up your Sprinklr account, please call [phone].    Once again, we want to ensure your discharge home is safe and that you have a clear understanding of any next steps in your care. If you have any questions or concerns, please do not hesitate to contact us, we are here for you. Thank you for choosing [facility] for your healthcare needs.    Sincerely,    Your [facility] Healthcare Team

## 2017-08-06 NOTE — ED AVS SNAPSHOT
Home Care Instructions                                                                                                                Primo Ca   MRN: [id number]    Department:  [facility], Emergency Dept   Date of Visit:  8/6/2017            [facility], Emergency Dept    [address] [place] [address]    Phone:  [phone]      You were seen by     Patrick Ramirez D.O.      Your Diagnosis Was     Vertigo     R42       These are the medications you received during your hospitalization from 08/06/2017 0653 to 08/06/2017 0824     Date/Time Order Dose Route Action    08/06/2017 0744 meclizine (ANTIVERT) tablet 25 mg 25 mg Oral Given      Follow-up Information     1. Follow up with [facility], Emergency Dept.    Specialty:  Emergency Medicine    Why:  If symptoms worsen    Contact information    [address] [facility] [place] [address] [phone]        2. Follow up with Jefferson Garcia M.D.. Schedule an appointment as soon as possible for a visit in 1 week.    Specialty:  Family Medicine    Contact information    [address]  [place] [id number] [phone]        Medication Information     Review all of your home medications and newly ordered medications with your primary doctor and/or pharmacist as soon as possible. Follow medication instructions as directed by your doctor and/or pharmacist.     Please keep your complete medication list with you and share with your physician. Update the information when medications are discontinued, doses are changed, or new medications (including over-the-counter products) are added; and carry medication information at all times in the event of emergency situations.               Medication List      START taking these medications        Instructions    Morning Afternoon Evening Bedtime    meclizine 25 MG Tabs   Last time this was given:  25 mg on 8/6/2017   7:44 AM   Commonly known as:  ANTIVERT        Take 1 Tab by mouth 3 times a day as needed (vertigo). No driving, no drinking alcohol.   Dose:  25 mg                          ASK your doctor about these medications        Instructions    Morning Afternoon Evening Bedtime    Alpha-Lipoic Acid 600 MG Caps        Take 1 Cap by mouth every day.   Dose:  1 Cap                        aspirin EC 81 MG Tbec   Commonly known as:  ECOTRIN        Take 81 mg by mouth every evening.   Dose:  81 mg                        atenolol 50 MG Tabs   Commonly known as:  TENORMIN        TAKE 1 TABLET BY MOUTH TWICE DAILY                        cyanocobalamin 500 MCG Tabs   Commonly known as:  VITAMIN B-12        Take 500 mcg by mouth every day.   Dose:  500 mcg                        diltiazem  MG Cp24   Commonly known as:  CARTIA XT        Take 1 Cap by mouth every day.   Dose:  240 mg                        docosahexanoic acid 1000 MG Caps   Commonly known as:  OMEGA 3 FA        Take 1,000 mg by mouth every day.   Dose:  1000 mg                        fluticasone 50 MCG/ACT nasal spray   Commonly known as:  FLONASE        Spray 2 Sprays in nose every day.   Dose:  2 Spray                        hydrochlorothiazide 25 MG Tabs   Commonly known as:  HYDRODIURIL        TAKE 1 TABLET BY MOUTH EVERY DAY                        indomethacin 50 MG Caps   Commonly known as:  INDOCIN        Take 50 mg by mouth 3 times a day.   Dose:  50 mg                        JANUMET  MG per tablet   Generic drug:  sitagliptan-metformin        TAKE 1 TABLET TWICE DAILY  WITH MEALS                        lisinopril 20 MG Tabs   Commonly known as:  PRINIVIL        TAKE 1 TABLET BY MOUTH TWICE DAILY                        lovastatin 20 MG Tabs   Commonly known as:  MEVACOR        TAKE 3 TABLETS BY MOUTH EVERY DAY                        Vitamin D 2000 UNITS Caps        Take 2,000 Units by mouth every day.   Dose:  2000 Units                                Where to Get Your Medications      You can get these medications from any pharmacy     Bring a paper prescription for each of these medications    - meclizine 25 MG Tabs            Procedures and tests performed during your visit     ACCU-CHEK GLUCOSE        Discharge Instructions         Benign Positional Vertigo  Vertigo means you feel like you or your surroundings are moving when they are not. Benign positional vertigo is the most common form of vertigo. Benign means that the cause of your condition is not serious. Benign positional vertigo is more common in older adults.  CAUSES   Benign positional vertigo is the result of an upset in the labyrinth system. This is an area in the middle ear that helps control your balance. This may be caused by a viral infection, head injury, or repetitive motion. However, often no specific cause is found.  SYMPTOMS   Symptoms of benign positional vertigo occur when you move your head or eyes in different directions. Some of the symptoms may include:  · Loss of balance and falls.  · Vomiting.  · Blurred vision.  · Dizziness.  · Nausea.  · Involuntary eye movements (nystagmus).  DIAGNOSIS   Benign positional vertigo is usually diagnosed by physical exam. If the specific cause of your benign positional vertigo is unknown, your caregiver may perform imaging tests, such as magnetic resonance imaging (MRI) or computed tomography (CT).  TREATMENT   Your caregiver may recommend movements or procedures to correct the benign positional vertigo. Medicines such as meclizine, benzodiazepines, and medicines for nausea may be used to treat your symptoms. In rare cases, if your symptoms are caused by certain conditions that affect the inner ear, you may need surgery.  HOME CARE INSTRUCTIONS   · Follow your caregiver's instructions.  · Move slowly. Do not make sudden body or head movements.  · Avoid driving.  · Avoid operating heavy machinery.  · Avoid performing any tasks that would be  dangerous to you or others during a vertigo episode.  · Drink enough fluids to keep your urine clear or pale yellow.  SEEK IMMEDIATE MEDICAL CARE IF:   · You develop problems with walking, weakness, numbness, or using your arms, hands, or legs.  · You have difficulty speaking.  · You develop severe headaches.  · Your nausea or vomiting continues or gets worse.  · You develop visual changes.  · Your family or friends notice any behavioral changes.  · Your condition gets worse.  · You have a fever.  · You develop a stiff neck or sensitivity to light.  MAKE SURE YOU:   · Understand these instructions.  · Will watch your condition.  · Will get help right away if you are not doing well or get worse.     This information is not intended to replace advice given to you by your health care provider. Make sure you discuss any questions you have with your health care provider.     Document Released: 09/25/2007 Document Revised: 03/11/2013 Document Reviewed: 04/11/2016  [employer] Interactive Patient Education ©2016 Elsevier Inc.    Driving and Equipment Restrictions  Some medical problems make it dangerous to drive, ride a bike, or use machines. Some of these problems are:  · A hard blow to the head (concussion).  · Passing out (fainting).  · Twitching and shaking (seizures).  · Low blood sugar.  · Taking medicine to help you relax (sedatives).  · Taking pain medicines.  · Wearing an eye patch.  · Wearing splints. This can make it hard to use parts of your body that you need to drive safely.  HOME CARE   · Do not drive until your doctor says it is okay.  · Do not use machines until your doctor says it is okay.  You may need a form signed by your doctor (medical release) before you can drive again. You may also need this form before you do other tasks where you need to be fully alert.  MAKE SURE YOU:  · Understand these instructions.  · Will watch your condition.  · Will get help right away if you are not doing well or get  worse.     This information is not intended to replace advice given to you by your health care provider. Make sure you discuss any questions you have with your health care provider.     Document Released: 01/25/2006 Document  Revised: 03/11/2013 Document Reviewed: 04/27/2011  Elsevier Interactive Patient Education ©2016 [employer]          Patient Information     Patient Information    Following emergency treatment: all patient requiring follow-up care must return either to a private physician or a clinic if your condition worsens before you are able to obtain further medical attention, please return to the emergency room.     Billing Information    At [facility], we work to make the billing process streamlined for our patients.  Our Representatives are here to answer any questions you may have regarding your hospital bill.  If you have insurance coverage and have supplied your insurance information to us, we will submit a claim to your insurer on your behalf.  Should you have any questions regarding your bill, we can be reached online or by phone as follows:  Online: You are able pay your bills online or live chat with our representatives about any billing questions you may have. We are here to help Monday - Friday from 8:00am to 7:30pm and 9:00am - 12:00pm on Saturdays.  Please visit [email or web address]  for more information.   Phone:  [phone] or [phone]    Please note that your emergency physician, surgeon, pathologist, radiologist, anesthesiologist, and other specialists are not employed by [facility] and will therefore bill separately for their services.  Please contact them directly for any questions concerning their bills at the numbers below:     Emergency Physician Services:  [phone]  [facility]:  [phone]  [facility]:  [phone]  [facility]:  [phone]    1. Your final bill may vary from the  amount quoted upon discharge if all procedures are not complete at that time, or if your doctor has additional procedures of which we are not aware. You will receive an additional bill if you return to the Emergency Department at [facility] for suture removal regardless of the facility of which the sutures were placed.     2. Please arrange for settlement of this account at the emergency registration.    3. All self-pay accounts are due in full at the time of treatment.  If you are unable to meet this obligation then payment is expected within 4-5 days.     4. If you have had radiology studies (CT, X-ray, Ultrasound, MRI), you have received a preliminary result during your emergency department visit. Please contact the radiology department [phone] to receive a copy of your final result. Please discuss the Final result with your primary physician or with the follow up physician provided.     Crisis Hotline:  [place] Crisis Hotline:  6-872-AKUHLBP or 1-124.571.9481  [place] Crisis Hotline:    1-547.885.1139 or 426-748-2216         ED Discharge Follow Up Questions    1. In order to provide you with very good care, we would like to follow up with a phone call in the next few days.  May we have your permission to contact you?     YES /  NO    2. What is the best phone number to call you? (       )_____-__________    3. What is the best time to call you?      Morning  /  Afternoon  /  Evening                   Patient Signature:  ____________________________________________________________    Date:  ____________________________________________________________      Your appointments     Aug 08, 2017 10:20 AM   ANNUAL EXAM PREVENTATIVE with GRAEME Iverson   [facility])    [address]  [facility] [address]   [phone]            Sep 20, 2017 10:00 AM   ACUPUNCTURE 30 with Kal Huizar M.D.   [facility] (--)     [address] [facility].  [facility] [address]   [phone]            Nov 20, 2017 10:20 AM   Established Patient with Jefferson Garcia M.D.   [facility])    [address]  [facility] [address]   [phone]           You will be receiving a confirmation call a few days before your appointment from our automated call confirmation system.

## 2017-08-06 NOTE — DISCHARGE INSTRUCTIONS
Benign Positional Vertigo  Vertigo means you feel like you or your surroundings are moving when they are not. Benign positional vertigo is the most common form of vertigo. Benign means that the cause of your condition is not serious. Benign positional vertigo is more common in older adults.  CAUSES   Benign positional vertigo is the result of an upset in the labyrinth system. This is an area in the middle ear that helps control your balance. This may be caused by a viral infection, head injury, or repetitive motion. However, often no specific cause is found.  SYMPTOMS   Symptoms of benign positional vertigo occur when you move your head or eyes in different directions. Some of the symptoms may include:  · Loss of balance and falls.  · Vomiting.  · Blurred vision.  · Dizziness.  · Nausea.  · Involuntary eye movements (nystagmus).  DIAGNOSIS   Benign positional vertigo is usually diagnosed by physical exam. If the specific cause of your benign positional vertigo is unknown, your caregiver may perform imaging tests, such as magnetic resonance imaging (MRI) or computed tomography (CT).  TREATMENT   Your caregiver may recommend movements or procedures to correct the benign positional vertigo. Medicines such as meclizine, benzodiazepines, and medicines for nausea may be used to treat your symptoms. In rare cases, if your symptoms are caused by certain conditions that affect the inner ear, you may need surgery.  HOME CARE INSTRUCTIONS   · Follow your caregiver's instructions.  · Move slowly. Do not make sudden body or head movements.  · Avoid driving.  · Avoid operating heavy machinery.  · Avoid performing any tasks that would be dangerous to you or others during a vertigo episode.  · Drink enough fluids to keep your urine clear or pale yellow.  SEEK IMMEDIATE MEDICAL CARE IF:   · You develop problems with walking, weakness, numbness, or using your arms, hands, or legs.  · You have difficulty speaking.  · You develop  severe headaches.  · Your nausea or vomiting continues or gets worse.  · You develop visual changes.  · Your family or friends notice any behavioral changes.  · Your condition gets worse.  · You have a fever.  · You develop a stiff neck or sensitivity to light.  MAKE SURE YOU:   · Understand these instructions.  · Will watch your condition.  · Will get help right away if you are not doing well or get worse.     This information is not intended to replace advice given to you by your health care provider. Make sure you discuss any questions you have with your health care provider.     Document Released: 09/25/2007 Document Revised: 03/11/2013 Document Reviewed: 04/11/2016  [employer] Patient Education ©2016 Elsevier Inc.    Driving and Equipment Restrictions  Some medical problems make it dangerous to drive, ride a bike, or use machines. Some of these problems are:  · A hard blow to the head (concussion).  · Passing out (fainting).  · Twitching and shaking (seizures).  · Low blood sugar.  · Taking medicine to help you relax (sedatives).  · Taking pain medicines.  · Wearing an eye patch.  · Wearing splints. This can make it hard to use parts of your body that you need to drive safely.  HOME CARE   · Do not drive until your doctor says it is okay.  · Do not use machines until your doctor says it is okay.  You may need a form signed by your doctor (medical release) before you can drive again. You may also need this form before you do other tasks where you need to be fully alert.  MAKE SURE YOU:  · Understand these instructions.  · Will watch your condition.  · Will get help right away if you are not doing well or get worse.     This information is not intended to replace advice given to you by your health care provider. Make sure you discuss any questions you have with your health care provider.     Document Released: 01/25/2006 Document  Revised: 03/11/2013 Document Reviewed: 04/27/2011  [employer]  Patient Education ©2016 Elsevier Inc.

## 2017-08-07 ENCOUNTER — PATIENT MESSAGE (OUTPATIENT)
Dept: MEDICAL GROUP | Facility: MEDICAL CENTER | Age: 77
End: 2017-08-07

## 2017-08-07 DIAGNOSIS — R42 VERTIGO: ICD-10-CM

## 2017-08-07 NOTE — TELEPHONE ENCOUNTER
From: Primo Ca  To: Jefferson Garcia M.D.  Sent: 8/7/2017 8:57 AM PDT  Subject: Non-Urgent Medical Question    I was in ER Sunday AM with an attack of Benign Positional Vertigo and was put on Meclizine 3x a day. Scheduled for wellness check tomorrow at 1020 with your PA. Can you refer me out to Dr Munson for tests the ER Dr suggested. WE went thru this about 5 years ago. I have postoned my [place] trip scheduled for 14th for 3 weeks, but would like to go a week later after we can get a handle on this and I feel better. Pills make me sleepy but symptoms return in about 6 hours. Thanks.

## 2017-08-08 ENCOUNTER — OFFICE VISIT (OUTPATIENT)
Dept: MEDICAL GROUP | Facility: MEDICAL CENTER | Age: 77
End: 2017-08-08
Payer: MEDICARE

## 2017-08-08 VITALS
WEIGHT: 234 LBS | BODY MASS INDEX: 33.5 KG/M2 | DIASTOLIC BLOOD PRESSURE: 88 MMHG | HEART RATE: 64 BPM | SYSTOLIC BLOOD PRESSURE: 140 MMHG | HEIGHT: 70 IN | TEMPERATURE: 97.9 F | OXYGEN SATURATION: 94 %

## 2017-08-08 DIAGNOSIS — I83.93 VARICOSE VEINS OF LEGS: ICD-10-CM

## 2017-08-08 DIAGNOSIS — G47.33 OSA ON CPAP: ICD-10-CM

## 2017-08-08 DIAGNOSIS — K21.9 GASTROESOPHAGEAL REFLUX DISEASE WITHOUT ESOPHAGITIS: ICD-10-CM

## 2017-08-08 DIAGNOSIS — M19.031 PRIMARY OSTEOARTHRITIS OF BOTH WRISTS: ICD-10-CM

## 2017-08-08 DIAGNOSIS — Z00.00 MEDICARE ANNUAL WELLNESS VISIT, SUBSEQUENT: Primary | ICD-10-CM

## 2017-08-08 DIAGNOSIS — E66.9 OBESITY (BMI 30-39.9): ICD-10-CM

## 2017-08-08 DIAGNOSIS — M79.605 PAIN OF LEFT LOWER EXTREMITY: ICD-10-CM

## 2017-08-08 DIAGNOSIS — G60.9 PERIPHERAL NEUROPATHY, IDIOPATHIC: ICD-10-CM

## 2017-08-08 DIAGNOSIS — Z85.828 HISTORY OF SQUAMOUS CELL CARCINOMA OF SKIN: ICD-10-CM

## 2017-08-08 DIAGNOSIS — E66.9 OBESITY (BMI 35.0-39.9 WITHOUT COMORBIDITY): Chronic | ICD-10-CM

## 2017-08-08 DIAGNOSIS — K57.30 DIVERTICULOSIS OF LARGE INTESTINE WITHOUT HEMORRHAGE: ICD-10-CM

## 2017-08-08 DIAGNOSIS — I10 ESSENTIAL HYPERTENSION: ICD-10-CM

## 2017-08-08 DIAGNOSIS — M54.12 CERVICAL RADICULOPATHY: ICD-10-CM

## 2017-08-08 DIAGNOSIS — E11.40 TYPE 2 DIABETES, CONTROLLED, WITH NEUROPATHY (HCC): ICD-10-CM

## 2017-08-08 DIAGNOSIS — R97.20 ELEVATED PSA, LESS THAN 10 NG/ML: ICD-10-CM

## 2017-08-08 DIAGNOSIS — D64.9 ANEMIA, UNSPECIFIED TYPE: ICD-10-CM

## 2017-08-08 DIAGNOSIS — E78.5 DYSLIPIDEMIA: ICD-10-CM

## 2017-08-08 DIAGNOSIS — L57.0 ACTINIC KERATOSIS: ICD-10-CM

## 2017-08-08 DIAGNOSIS — I67.89 CEREBRAL MICROVASCULAR DISEASE: ICD-10-CM

## 2017-08-08 DIAGNOSIS — M1A.0790 CHRONIC IDIOPATHIC GOUT INVOLVING TOE WITHOUT TOPHUS, UNSPECIFIED LATERALITY: ICD-10-CM

## 2017-08-08 DIAGNOSIS — H91.91 DECREASED HEARING, RIGHT: ICD-10-CM

## 2017-08-08 DIAGNOSIS — E11.40 TYPE 2 DIABETES MELLITUS WITH DIABETIC NEUROPATHY, WITHOUT LONG-TERM CURRENT USE OF INSULIN (HCC): ICD-10-CM

## 2017-08-08 DIAGNOSIS — M54.2 NECK PAIN ON LEFT SIDE: ICD-10-CM

## 2017-08-08 DIAGNOSIS — J30.1 SEASONAL ALLERGIC RHINITIS DUE TO POLLEN: ICD-10-CM

## 2017-08-08 DIAGNOSIS — M19.032 PRIMARY OSTEOARTHRITIS OF BOTH WRISTS: ICD-10-CM

## 2017-08-08 PROCEDURE — G0439 PPPS, SUBSEQ VISIT: HCPCS | Performed by: NURSE PRACTITIONER

## 2017-08-08 ASSESSMENT — PATIENT HEALTH QUESTIONNAIRE - PHQ9: CLINICAL INTERPRETATION OF PHQ2 SCORE: 0

## 2017-08-08 NOTE — ASSESSMENT & PLAN NOTE
Chronic condition managed with current medical regimen  Stable per review, denies abd pain   Continue with surveillance and diet  Followed by Jefferson Garcia M.D..

## 2017-08-08 NOTE — ASSESSMENT & PLAN NOTE
Chronic condition managed with current medical regimen  Stable per review with hearing aide   Continue with surveillance  Followed by Jefferson Garcia M.D..

## 2017-08-08 NOTE — PATIENT INSTRUCTIONS
Continue with care through Jefferson Garcia M.D..  Next Medicare Annual Wellness Visit is due in one year.    Continue care with specialists you are seeing for your chronic problems.    Attached is some preventative information for you to read.          Fall Prevention and Home Safety  Falls cause injuries and can affect all age groups. It is possible to prevent falls.   HOW TO PREVENT FALLS  · Wear shoes with rubber soles that do not have an opening for your toes.   · Keep the inside and outside of your house well lit.   · Use night lights throughout your home.   · Remove clutter from floors.   · Clean up floor spills.   · Remove throw rugs or fasten them to the floor with carpet tape.   · Do not place electrical cords across pathways.   · Put grab bars by your tub, shower, and toilet. Do not use towel bars as grab bars.   · Put handrails on both sides of the stairway. Fix loose handrails.   · Do not climb on stools or stepladders, if possible.   · Do not wax your floors.   · Repair uneven or unsafe sidewalks, walkways, or stairs.   · Keep items you use a lot within reach.   · Be aware of pets.   · Keep emergency numbers next to the telephone.   · Put smoke detectors in your home and near bedrooms.   Ask your doctor what other things you can do to prevent falls.  Document Released: 10/14/2010 Document Revised: 06/18/2013 Document Reviewed: 03/19/2013  ExitCare® Patient Information ©2013 [employer].    Exercise to Stay Healthy      Exercise helps you become and stay healthy.  EXERCISE IDEAS AND TIPS  Choose exercises that:  · You enjoy.   · Fit into your day.   You do not need to exercise really hard to be healthy. You can do exercises at a slow or medium level and stay healthy. You can:  · Stretch before and after working out.   · Try yoga, Pilates, or reymundo chi.   · Lift weights.   · Walk fast, swim, jog, run, climb stairs, bicycle, dance, or rollerskate.   · Take aerobic classes.   Exercises that burn about 150  calories:  · Running 1 ½ miles in 15 minutes.   · Playing volleyball for 45 to 60 minutes.   · Washing and waxing a car for 45 to 60 minutes.   · Playing touch football for 45 minutes.   · Walking 1 ¾ miles in 35 minutes.   · Pushing a stroller 1 ½ miles in 30 minutes.   · Playing basketball for 30 minutes.   · Raking leaves for 30 minutes.   · Bicycling 5 miles in 30 minutes.   · Walking 2 miles in 30 minutes.   · Dancing for 30 minutes.   · Shoveling snow for 15 minutes.   · Swimming laps for 20 minutes.   · Walking up stairs for 15 minutes.   · Bicycling 4 miles in 15 minutes.   · Gardening for 30 to 45 minutes.   · Jumping rope for 15 minutes.   · Washing windows or floors for 45 to 60 minutes.     One suggestion is to start walking for 10 minutes after each meal.  This will help with digestion and help you to metabolize your meal.       For Weight Loss -   Recommend portion sizes with more fruits/vegetables/high fiber foods.  Stay away from white bread/pastas/white rice/white potatoes.  Increase Fluid intake to 6-8 glasses of water daily.   Eliminate soda's (diet and regular) from your fluid intake.      Document Released: 01/20/2012 Document Revised: 03/11/2013 Document Reviewed: 01/20/2012  ExitCare® Patient Information ©2013 [employer].    Fat and Cholesterol Control Diet  Cholesterol is a wax-like substance. It comes from your liver and is found in certain foods. There is good (HDL) and bad (LDL) cholesterol. Too much cholesterol in your blood can affect your heart. Certain foods can lower or raise your cholesterol. Eat foods that are low in cholesterol.  Saturated and trans fats are bad fats found in foods that will raise your cholesterol. Do not eat foods that are high in saturated and trans fats.  FOODS HIGHER IN SATURATED AND TRANS FATS  · Dairy products, such as whole milk, eggs, cheese, cream, and butter.   · Fatty meats, such as hot dogs, sausage, and salami.   · Fried foods.   · Trans fats which  are found in margarine and pre-made cookies, crackers, and baked goods.   · Tropical oils, such as coconut and palm oils.   Read package labels at the store. Do not buy products that use saturated or trans fats or hydrogenated oils. Find foods labeled:  · Low-fat.   · Low-saturated fat.   · Trans-fat-free.   · Low-cholesterol.   FOODS LOWER IN CHOLESTEROL  ·  Fruit.   · Vegetables.   · Beans, peas, and lentils.   · Fish.   · Lean meat, such as chicken (without skin) or ground turkey.   · Grains, such as barley, rice, couscous, bulgur wheat, and pasta.   · Heart-healthy tub margarine.   PREPARING YOUR FOOD  · Broil, bake, steam, or roast foods. Do not cortez food.   · Use non-stick cooking sprays.   · Use lemon or herbs to flavor food instead of using butter or stick margarine.   · Use nonfat yogurt, salsa, or low-fat dressings for salads.   Document Released: 06/18/2013 Document Reviewed: 06/18/2013  ExitCare® Patient Information ©2013 [employer].    Recommend annual flu vaccine.  Next due in Fall, 2015.  If you decide not to have the flu vaccine, recommend good handwashing and staying out of crowds during flu season.        Basic Carbohydrate Counting for Diabetes Mellitus  Carbohydrate counting is a method for keeping track of the amount of carbohydrates you eat. Eating carbohydrates naturally increases the level of sugar (glucose) in your blood, so it is important for you to know the amount that is okay for you to have in every meal. Carbohydrate counting helps keep the level of glucose in your blood within normal limits. The amount of carbohydrates allowed is different for every person. A dietitian can help you calculate the amount that is right for you. Once you know the amount of carbohydrates you can have, you can count the carbohydrates in the foods you want to eat.  Carbohydrates are found in the following foods:  · Grains, such as breads and cereals.  · Dried beans and soy products.  · Starchy vegetables,  "such as potatoes, peas, and corn.  · Fruit and fruit juices.  · Milk and yogurt.  · Sweets and snack foods, such as cake, cookies, candy, chips, soft drinks, and fruit drinks.  CARBOHYDRATE COUNTING  There are two ways to count the carbohydrates in your food. You can use either of the methods or a combination of both.  Reading the \"Nutrition Facts\" on Packaged Food  The \"Nutrition Facts\" is an area that is included on the labels of almost all packaged food and beverages in the [place]. It includes the serving size of that food or beverage and information about the nutrients in each serving of the food, including the grams (g) of carbohydrate per serving.   Decide the number of servings of this food or beverage that you will be able to eat or drink. Multiply that number of servings by the number of grams of carbohydrate that is listed on the label for that serving. The total will be the amount of carbohydrates you will be having when you eat or drink this food or beverage.  Learning Standard Serving Sizes of Food  When you eat food that is not packaged or does not include \"Nutrition Facts\" on the label, you need to measure the servings in order to count the amount of carbohydrates. A serving of most carbohydrate-rich foods contains about 15 g of carbohydrates. The following list includes serving sizes of carbohydrate-rich foods that provide 15 g of carbohydrate per serving:    · 1 slice of bread (1 oz) or 1 six-inch tortilla.    · ½ of a hamburger bun or English muffin.  · 4-6 crackers.  · ¾ cup unsweetened dry cereal.    · ½ cup hot cereal.  ·  cup rice or pasta.    · ½ cup mashed potatoes or ¼ of a large baked potato.  · 1 cup fresh fruit or one small piece of fruit.    · ½ cup canned or frozen fruit or fruit juice.  · 1 cup milk.  ·  cup plain fat-free yogurt or yogurt sweetened with artificial sweeteners.  · ½ cup cooked dried beans or starchy vegetable, such as peas, corn, or potatoes.    Decide the number " of standard-size servings that you will eat. Multiply that number of servings by 15 (the grams of carbohydrates in that serving). For example, if you eat 2 cups of strawberries, you will have eaten 2 servings and 30 g of carbohydrates (2 servings x 15 g = 30 g). For foods such as soups and casseroles, in which more than one food is mixed in, you will need to count the carbohydrates in each food that is included.  EXAMPLE OF CARBOHYDRATE COUNTING  Sample Dinner   · 3 oz chicken breast.  ·  cup of brown rice.  · ½ cup of corn.  · 1 cup milk.    · 1 cup strawberries with sugar-free whipped topping.    Carbohydrate Calculation   Step 1: Identify the foods that contain carbohydrates:   · Rice.    · Corn.    · Milk.    · Strawberries.  Step 2: Calculate the number of servings eaten of each:   · 2 servings of rice.    · 1 serving of corn.    · 1 serving of milk.    · 1 serving of strawberries.  Step 3:  Multiply each of those number of servings by 15 g:   · 2 servings of rice x 15 g = 30 g.    · 1 serving of corn x 15 g = 15 g.    · 1 serving of milk x 15 g = 15 g.    · 1 serving of strawberries x 15 g = 15 g.  Step 4: Add together all of the amounts to find the total grams of carbohydrates eaten: 30 g + 15 g + 15 g + 15 g = 75 g.     This information is not intended to replace advice given to you by your health care provider. Make sure you discuss any questions you have with your health care provider.     Document Released: 12/18/2006 Document Revised: 01/08/2016 Document Reviewed: 11/14/2014  [employer] Interactive Patient Education ©2016 [employer]

## 2017-08-08 NOTE — ASSESSMENT & PLAN NOTE
Patient aware of relationship between weight and health and working on diet  Pt is very seriously working on wgt and exercise to decrease his A1C  Followed by Jefferson Garcia M.D..

## 2017-08-08 NOTE — ASSESSMENT & PLAN NOTE
Chronic condition managed with current medical regimen  Stable per review   Continue with diet management  Followed by Jefferson Garcia M.D..

## 2017-08-08 NOTE — ASSESSMENT & PLAN NOTE
Chronic condition managed with current medical regimen  Stable per review with intermit flares   Continue with acupuncture with success  Followed by Jefferson Garcia M.D..

## 2017-08-08 NOTE — ASSESSMENT & PLAN NOTE
Chronic condition managed with current medical regimen  Stable per review   Continue with current meds, surveillance  Followed by Jefferson Garcia M.D..

## 2017-08-08 NOTE — ASSESSMENT & PLAN NOTE
Chronic condition managed with current medical regimen  Stable per review, no change numbness ally feet   Continue with surveillance  Followed by Jefferson Garcia M.D..

## 2017-08-08 NOTE — ASSESSMENT & PLAN NOTE
Chronic condition managed with current medical regimen  Neuropathy unchanged, numb sensation ally feet  Home testing prn 120s-150s   Continue with current meds, diet and exercise, using treadmill  Followed by Jefferson Garcia M.D..

## 2017-08-08 NOTE — PROGRESS NOTES
CC:   Medicare Annual Wellness Visit    HPI:  Primo is a 76 y.o. here for Medicare Annual Wellness Visit    Patient Active Problem List    Diagnosis Date Noted   • Elevated PSA, less than 10 ng/ml 03/24/2017   • Decreased hearing 03/24/2017   • Absolute anemia 03/15/2016   • Primary osteoarthritis of both wrists 03/01/2016   • Cervical radiculopathy 10/08/2015   • Peripheral neuropathy, idiopathic 10/08/2015   • ALEX on CPAP 04/29/2015   • BPH (benign prostatic hyperplasia) 04/02/2015   • Neck pain on left side 10/30/2014   • Diverticulosis of colon 08/22/2014   • Diabetic neuropathy, type II diabetes mellitus (CMS-HCC) 03/20/2014   • Varicose veins of legs 03/20/2014   • Actinic keratosis 03/20/2014   • History of squamous cell carcinoma of skin 03/20/2014   • Cerebral microvascular disease 03/04/2014   • Seasonal allergies 02/10/2014   • Leg pain 01/16/2013   • Type 2 diabetes, controlled, with neuropathy (CMS-HCC) 10/19/2009   • HTN (hypertension) 10/19/2009   • Dyslipidemia 10/19/2009   • Chronic gout 10/19/2009   • GERD (gastroesophageal reflux disease) 10/19/2009   • Obesity (BMI 35.0-39.9 without comorbidity) ([facility])      Current Outpatient Prescriptions   Medication Sig Dispense Refill   • POTASSIUM PO Take  by mouth.     • meclizine (ANTIVERT) 25 MG Tab Take 1 Tab by mouth 3 times a day as needed (vertigo). No driving, no drinking alcohol. 30 Tab 0   • hydrochlorothiazide (HYDRODIURIL) 25 MG Tab TAKE 1 TABLET BY MOUTH EVERY DAY 90 Tab 3   • JANUMET  MG per tablet TAKE 1 TABLET TWICE DAILY  WITH MEALS 180 Tab 1   • lisinopril (PRINIVIL) 20 MG Tab TAKE 1 TABLET BY MOUTH TWICE DAILY 180 Tab 3   • diltiazem CD (CARTIA XT) 240 MG CAPSULE SR 24 HR Take 1 Cap by mouth every day. 90 Cap 3   • indomethacin (INDOCIN) 50 MG Cap Take 50 mg by mouth 3 times a day.     • fluticasone (FLONASE) 50 MCG/ACT nasal spray Spray 2 Sprays in nose every day. 16 g 3   • Cholecalciferol (VITAMIN D) 2000 UNITS Cap Take 2,000  Units by mouth every day.     • aspirin EC (ECOTRIN) 81 MG Tablet Delayed Response Take 81 mg by mouth every evening.     • atenolol (TENORMIN) 50 MG Tab TAKE 1 TABLET BY MOUTH TWICE DAILY 180 Tab 3   • lovastatin (MEVACOR) 20 MG Tab TAKE 3 TABLETS BY MOUTH EVERY  Tab 3   • Alpha-Lipoic Acid 600 MG CAPS Take 1 Cap by mouth every day.     • cyanocobalamin (VITAMIN B-12) 500 MCG TABS Take 500 mcg by mouth every day.     • docosahexanoic acid (FISH OIL) 1000 MG CAPS Take 1,000 mg by mouth every day.       No current facility-administered medications for this visit.      Current supplements: no  Chronic narcotic pain medicines: no  Allergies: Spironolactone  Exercise: yes treadmill  Current social contact/activities: Has support of family and friends      Screening:  Depression Screening    Little interest or pleasure in doing things?  0 - not at all  Feeling down, depressed , or hopeless? 0 - not at all  Trouble falling or staying asleep, or sleeping too much?     Feeling tired or having little energy?     Poor appetite or overeating?     Feeling bad about yourself - or that you are a failure or have let yourself or your family down?    Trouble concentrating on things, such as reading the newspaper or watching television?    Moving or speaking so slowly that other people could have noticed.  Or the opposite - being so fidgety or restless that you have been moving around a lot more than usual?     Thoughts that you would be better off dead, or of hurting yourself?     Patient Health Questionnaire Score:      If depressive symptoms identified deferred to follow up visit unless specifically addressed in assessment and plan.    Interpretation of PHQ-9 Total Score   Score Severity   1-4 No Depression   5-9 Mild Depression   10-14 Moderate Depression   15-19 Moderately Severe Depression   20-27 Severe Depression      Screening for Cognitive Impairment    Three Minute Recall (apple, watch, vasile)  3/3    Draw clock  face with all 12 numbers set to the hand to show 10 minutes past 11 o'clock  1 5  Cognitive concerns identified deferred for follow up unless specifically addressed in assessment and plan.    Fall Risk Assessment    Has the patient had two or more falls in the last year or any fall with injury in the last year?  No    Safety Assessment    Throw rugs on floor.  Yes  Handrails on all stairs.  Yes  Good lighting in all hallways.  Yes  Difficulty hearing.  No  Patient counseled about all safety risks that were identified.    Functional Assessment ADLs    Are there any barriers preventing you from cooking for yourself or meeting nutritional needs?  No.    Are there any barriers preventing you from driving safely or obtaining transportation?  No.    Are there any barriers preventing you from using a telephone or calling for help?  No.    Are there any barriers preventing you from shopping?  No.    Are there any barriers preventing you from taking care of your own finances?  No.    Are there any barriers preventing you from managing your medications?  No.    Are currently engaging any exercise or physical activity?  Yes.       Health Maintenance Summary                IMM DTaP/Tdap/Td Vaccine Overdue 8/27/1959     Annual Wellness Visit Overdue 8/3/2017      Done 8/2/2016      Patient has more history with this topic...    IMM INFLUENZA Next Due 9/1/2017      Done 10/19/2009 Imm Admin: Influenza TIV (IM)     Patient has more history with this topic...    IMM PNEUMOCOCCAL 65+ (ADULT) LOW/MEDIUM RISK SERIES Next Due 9/22/2017      Done 9/22/2016 Imm Admin: Pneumococcal Conjugate Vaccine (Prevnar/PCV-13)    A1C SCREENING Next Due 1/5/2018      Done 7/5/2017 HEMOGLOBIN A1C (A)     Patient has more history with this topic...    FASTING LIPID PROFILE Next Due 7/5/2018      Done 7/5/2017 LIPID PROFILE (A)     Patient has more history with this topic...    URINE ACR / MICROALBUMIN Next Due 7/5/2018      Done 7/5/2017 MICROALBUMIN  CREAT RATIO URINE (A)     Patient has more history with this topic...    SERUM CREATININE Next Due 7/5/2018      Done 7/5/2017 COMP METABOLIC PANEL (A)     Patient has more history with this topic...    DIABETES MONOFILAMENT / LE EXAM Next Due 7/11/2018      Done 7/11/2017 AMB DIABETIC MONOFILAMENT LOWER EXTREMITY EXAM     Patient has more history with this topic...    RETINAL SCREENING Next Due 7/24/2018      Done 7/24/2017 REFERRAL FOR RETINAL SCREENING EXAM     Patient has more history with this topic...    COLONOSCOPY Next Due 8/19/2024      Done 8/19/2014 AMB REFERRAL TO GI FOR COLONOSCOPY          Patient Care Team:  Jefferson Garcia M.D. as PCP - General (Family Medicine)  Moy Rai M.D. as Consulting Physician (Urology)  Anthony Guillermo M.D. as Consulting Physician (Pulmonary Medicine)  Kal Huizar M.D. as Consulting Physician (Family Medicine)  MICHAEL Mix. as Consulting Physician (Audiology)  Mendoza Frederick M.D. as Consulting Physician (Dermatology)        Social History   Substance Use Topics   • Smoking status: Former Smoker -- 22 years     Types: Pipe, Cigars     Start date: 04/01/1975     Quit date: 08/02/1981   • Smokeless tobacco: Never Used   • Alcohol Use: No       Family History   Problem Relation Age of Onset   • Stroke Mother    • Hypertension Mother    • Cancer Father      colon cancer;liver   • Stroke Brother    • Hypertension Brother      He  has a past medical history of Hypertension; Dyslipidemia; Gout; Vertigo; Obesity; Diabetes; and GERD (gastroesophageal reflux disease). He also has no past medical history of Encounter for long-term (current) use of other medications.   Past Surgical History   Procedure Laterality Date   • Cholecystectomy         ROS:    Ostomy or other tubes or amputations: no  Chronic oxygen use no  Last eye exam 4/2017  : Denies incontinence.   Gait: Uses no assistive device   Hearing excellent.    Dentition good    Exam: Blood pressure 140/88,  "pulse 64, temperature 36.6 °C (97.9 °F), height 1.778 m (5' 10\"), weight 106.142 kg (234 lb), SpO2 94 %. Body mass index is 33.58 kg/(m^2).  Alert, oriented in no acute distress.  Eye contact is good, speech goal directed, affect calm      Assessment and Plan. The following treatment and monitoring plan is recommended for all problems as listed below:   Varicose veins of legs  Chronic condition managed with current medical regimen  Stable per review, denies aching, burning or pain   Continue with surveillance  Followed by Jefferson Garcia M.D..        Type 2 diabetes, controlled, with neuropathy  Chronic condition managed with current medical regimen  Neuropathy unchanged, numb sensation ally feet  Home testing prn 120s-150s   Continue with current meds, diet and exercise, using treadmill  Followed by Jefferson Garcia M.D..        Primary osteoarthritis of both wrists  Chronic condition managed with current medical regimen  Stable per review at this time   Continue with current meds prn and acupuncture  Followed by Jefferson Garcia M.D..        ALEX on CPAP  CPAP without O2  Sleeps through noc and feels rested in am  Followed by pulm    Obesity (BMI 35.0-39.9 without comorbidity)  Patient aware of relationship between weight and health and working on diet  Pt is very seriously working on wgt and exercise to decrease his A1C  Followed by Jefferson Garcia M.D..     Obesity (BMI 30-39.9)  duplicate    History of squamous cell carcinoma of skin  Recent removal of skin cancer to L shoulder  Followed by derm q 6 months    Diverticulosis of colon  Chronic condition managed with current medical regimen  Stable per review, denies abd pain   Continue with surveillance and diet  Followed by Jefferson Garcia M.D..        Chronic gout  Chronic condition managed with current medical regimen  Stable per review with rare flares   Continue with current meds  Followed by Jefferson Garcia M.D..        Seasonal allergies  Chronic condition managed with " current medical regimen  Stable per review   Continue with current meds prn  Followed by Jefferson Garcia M.D..        Peripheral neuropathy, idiopathic  Chronic condition managed with current medical regimen  Stable per review, no change numbness ally feet   Continue with surveillance  Followed by Jefferson Garcia M.D..        Neck pain on left side  Chronic condition managed with current medical regimen  Stable per review with intermit flares   Continue with acupuncture with success  Followed by Jefferson Garcia M.D..        Leg pain  Intermit pain, acupuncture treatment effective  Good mobility    HTN (hypertension)  Chronic condition managed with current medical regimen  Stable per review   Continue with current meds  Followed by Jefferson Garcia M.D..         GERD (Gastroesophageal Reflux Disease)  Chronic condition managed with current medical regimen  Stable per review   Continue with diet management  Followed by Jefferson Garcia M.D..        Elevated PSA, less than 10 ng/ml  7/5/2017   Prostatic Specific Antigen Tot 6.17 (H) 0.00 - 4.00 ng/mL Final   Followed by Jefferson Garcia M.D..       Dyslipidemia  Chronic condition managed with current medical regimen  Labs reviewed    Continue with current meds  Followed by Jefferson Garcia M.D..        Diabetic neuropathy, type II diabetes mellitus  Chronic condition managed with current medical regimen  Neuropathy unchanged, numb sensation ally feet  Home testing prn 120s-150s   Continue with current meds, diet and exercise, using treadmill  Followed by Jefferson Garcia M.D..          Decreased hearing  Chronic condition managed with current medical regimen  Stable per review with hearing aide   Continue with surveillance  Followed by Jefferson Garcia M.D..        Cervical radiculopathy  Chronic condition managed with current medical regimen  Stable per review, intermit   Continue with acupuncture, effective  Followed by Jefferson Garcia M.D..        Cerebral microvascular disease  Chronic  condition managed with current medical regimen  Stable per review   Continue with current meds, surveillance  Followed by Jefferson Garcia M.D..        BPH (benign prostatic hyperplasia)  7/5/2017   Prostatic Specific Antigen Tot 6.17 (H) 0.00 - 4.00 ng/mL Final   Followed by Jefferson Garcia M.D..       Actinic keratosis  Intermit occurrence  Followed by derm q6-12 months    Absolute anemia  Followed by Jefferson Garcia M.D..       Health Care Screening recommendations reviewed with patient today: per Patient Instructions  DPA/Advanced directive: .has an advanced directive - recom a copy be provided    Next office visit for recheck of chronic medical conditions is due with Jefferson Garcia M.D. 11/20/2017

## 2017-08-08 NOTE — ASSESSMENT & PLAN NOTE
Chronic condition managed with current medical regimen  Stable per review   Continue with current meds prn  Followed by Jefferson Garcia M.D..

## 2017-08-08 NOTE — MR AVS SNAPSHOT
"        Primo Gambino Lanny   2017 10:20 AM   Office Visit   MRN: [id number]    Department:  [facility]   Dept Phone:  [phone]    Description:  Male : 1940   Provider:  GRAEME Iverson           Reason for Visit     Annual Exam subsequent      Allergies as of 2017     Allergen Noted Reactions    Spironolactone 2016       diarrhea      You were diagnosed with     Varicose veins of legs   [133843]       Type 2 diabetes, controlled, with neuropathy (CMS-HCC)   [548000]       Primary osteoarthritis of both wrists   [7747444]       ALEX on CPAP   [752487]       Obesity (BMI 35.0-39.9 without comorbidity) ([facility])   [829193]       Obesity (BMI 30-39.9)   [599118]       History of squamous cell carcinoma of skin   [234436]       Diverticulosis of large intestine without hemorrhage   [5569035]       Chronic idiopathic gout involving toe without tophus, unspecified laterality   [4239636]         Vital Signs     Blood Pressure Pulse Temperature Height Weight Body Mass Index    140/88 mmHg 64 36.6 °C (97.9 °F) 1.778 m (5' 10\") 106.142 kg (234 lb) 33.58 kg/m2    Oxygen Saturation Smoking Status                94% Former Smoker          Basic Information     Date Of Birth Sex Race Ethnicity Preferred Language    1940 Male White Non- English      Your appointments     Sep 20, 2017 10:00 AM   ACUPUNCTURE 30 with Kal Huizar M.D.   [facility] (--)    [address]  [place] [address] [phone]            2017 10:20 AM   Established Patient with Jefferson Garcia M.D.   [facility]    [address]  [place] [address] [phone]           You will be receiving a confirmation call a few days before your appointment from our automated call confirmation system.              Problem List              ICD-10-CM Priority Class Noted - Resolved    Type 2 diabetes, " controlled, with neuropathy (CMS-[facility]) E11.40   10/19/2009 - Present    HTN (hypertension) I10   10/19/2009 - Present    Dyslipidemia E78.5   10/19/2009 - Present    Chronic gout M1A.9XX0   10/19/2009 - Present    GERD (gastroesophageal reflux disease) K21.9   10/19/2009 - Present    Obesity (BMI 35.0-39.9 without comorbidity) (HCC) (Chronic) E66.9   Unknown - Present    Leg pain M79.606   1/16/2013 - Present    Seasonal allergies J30.2   2/10/2014 - Present    Cerebral microvascular disease I67.9   3/4/2014 - Present    Diabetic neuropathy, type II diabetes mellitus (CMS-[facility]) E11.40   3/20/2014 - Present    Varicose veins of legs I83.93   3/20/2014 - Present    Actinic keratosis L57.0   3/20/2014 - Present    History of squamous cell carcinoma of skin Z85.828   3/20/2014 - Present    Diverticulosis of colon K57.30   8/22/2014 - Present    Neck pain on left side M54.2   10/30/2014 - Present    BPH (benign prostatic hyperplasia) N40.0   4/2/2015 - Present    ALEX on CPAP G47.33, Z99.89   4/29/2015 - Present    Cervical radiculopathy M54.12   10/8/2015 - Present    Peripheral neuropathy, idiopathic G60.9   10/8/2015 - Present    Primary osteoarthritis of both wrists M19.031, M19.032   3/1/2016 - Present    Absolute anemia D64.9   3/15/2016 - Present    Elevated PSA, less than 10 ng/ml R97.20   3/24/2017 - Present    Decreased hearing H91.90   3/24/2017 - Present      Health Maintenance        Date Due Completion Dates    IMM DTaP/Tdap/Td Vaccine (1 - Tdap) 8/27/1959 ---    IMM INFLUENZA (1) 9/1/2017 10/19/2009, 10/25/2007    IMM PNEUMOCOCCAL 65+ (ADULT) LOW/MEDIUM RISK SERIES (2 of 2 - PPSV23) 9/22/2017 9/22/2016    A1C SCREENING 1/5/2018 7/5/2017, 3/14/2017, 9/16/2016, 2/29/2016, 10/19/2015, 3/17/2015, 10/23/2014, 2/3/2014, 11/4/2013, 5/24/2013, 10/15/2012, 4/5/2012, 11/14/2011    FASTING LIPID PROFILE 7/5/2018 7/5/2017, 3/14/2017, 9/16/2016, 2/29/2016, 10/19/2015, 3/17/2015, 10/23/2014, 3/5/2014, 11/4/2013,  10/15/2012, 4/5/2012, 2/11/2011, 10/12/2009    URINE ACR / MICROALBUMIN 7/5/2018 7/5/2017, 3/14/2017, 9/16/2016, 2/29/2016, 10/19/2015, 3/17/2015, 10/23/2014, 2/3/2014, 4/5/2012    SERUM CREATININE 7/5/2018 7/5/2017, 3/14/2017, 3/8/2017, 9/16/2016, 5/9/2016, 4/11/2016, 2/29/2016, 11/26/2015, 10/19/2015, 4/28/2015, 4/26/2015, 3/17/2015, 10/23/2014, 3/4/2014, 2/3/2014, 1/15/2013, 12/9/2011, 2/11/2011, 10/12/2009    DIABETES MONOFILAMENT / LE EXAM 7/11/2018 7/11/2017, 9/22/2016, 4/2/2015, 3/20/2014    RETINAL SCREENING 7/24/2018 7/24/2017, 5/18/2017, 4/22/2015, 11/24/2014    COLONOSCOPY 8/19/2024 8/19/2014            Current Immunizations     13-VALENT PCV PREVNAR 9/22/2016    INFLUENZA VACCINE H1N1 10/1/2013    Influenza TIV (IM) 10/19/2009    Influenza Vaccine Pediatric 10/25/2007    Pneumococcal Vaccine (UF)Historical Data 1/1/2010    SHINGLES VACCINE 10/19/2009      Below and/or attached are the medications your provider expects you to take. Review all of your home medications and newly ordered medications with your provider and/or pharmacist. Follow medication instructions as directed by your provider and/or pharmacist. Please keep your medication list with you and share with your provider. Update the information when medications are discontinued, doses are changed, or new medications (including over-the-counter products) are added; and carry medication information at all times in the event of emergency situations     Allergies:  SPIRONOLACTONE - (reactions not documented)               Medications  Valid as of: August 08, 2017 - 11:25 AM    Generic Name Brand Name Tablet Size Instructions for use    Alpha-Lipoic Acid (Cap) Alpha-Lipoic Acid 600 MG Take 1 Cap by mouth every day.        Aspirin (Tablet Delayed Response) ECOTRIN 81 MG Take 81 mg by mouth every evening.        Atenolol (Tab) TENORMIN 50 MG TAKE 1 TABLET BY MOUTH TWICE DAILY        Cholecalciferol (Cap) Vitamin D 2000 UNITS Take 2,000 Units by mouth  every day.        Cyanocobalamin (Tab) VITAMIN B-12 500 MCG Take 500 mcg by mouth every day.        DilTIAZem HCl Coated Beads (CAPSULE SR 24 HR) CARDIZEM  MG Take 1 Cap by mouth every day.        Fluticasone Propionate (Suspension) FLONASE 50 MCG/ACT Spray 2 Sprays in nose every day.        HydroCHLOROthiazide (Tab) HYDRODIURIL 25 MG TAKE 1 TABLET BY MOUTH EVERY DAY        Indomethacin (Cap) INDOCIN 50 MG Take 50 mg by mouth 3 times a day.        Lisinopril (Tab) PRINIVIL 20 MG TAKE 1 TABLET BY MOUTH TWICE DAILY        Lovastatin (Tab) MEVACOR 20 MG TAKE 3 TABLETS BY MOUTH EVERY DAY        Meclizine HCl (Tab) ANTIVERT 25 MG Take 1 Tab by mouth 3 times a day as needed (vertigo). No driving, no drinking alcohol.        Omega-3 Fatty Acids (Cap) OMEGA 3 FA 1000 MG Take 1,000 mg by mouth every day.        Potassium   Take  by mouth.        SITagliptin-MetFORMIN HCl (Tab) JANUMET  MG TAKE 1 TABLET TWICE DAILY  WITH MEALS        .                 Medicines prescribed today were sent to:     [facility] [address] [place] [address] - [address] [facility] AT [facility]    [address] [facility] [address]    Phone: [phone] Fax: [phone]    Open 24 Hours?: No    [facility] [place], [place] - [address] AT PORTAL TO REGISTERED [facility]    [address] [facility] [address]    Phone: [phone] Fax: [phone]    Open 24 Hours?: No    [id number] - [place], [place] - [address] AT [facility] [address]    [address] [facility] [address]    Phone: [phone] Fax: [phone]    Open 24 Hours?: No      Medication refill instructions:       If your prescription bottle indicates you have medication refills left, it is not necessary to call your provider’s office. Please contact your pharmacy and they will refill your medication.    If your prescription bottle indicates you do not have any refills left, you may request  refills at any time through one of the following ways: The online basno system (except Urgent Care), by calling your provider’s office, or by asking your pharmacy to contact your provider’s office with a refill request. Medication refills are processed only during regular business hours and may not be available until the next business day. Your provider may request additional information or to have a follow-up visit with you prior to refilling your medication.   *Please Note: Medication refills are assigned a new Rx number when refilled electronically. Your pharmacy may indicate that no refills were authorized even though a new prescription for the same medication is available at the pharmacy. Please request the medicine by name with the pharmacy before contacting your provider for a refill.           basno Access Code: Activation code not generated  Current basno Status: Active

## 2017-08-08 NOTE — ASSESSMENT & PLAN NOTE
Chronic condition managed with current medical regimen  Stable per review at this time   Continue with current meds prn and acupuncture  Followed by Jefferson Garcia M.D..

## 2017-08-08 NOTE — ASSESSMENT & PLAN NOTE
7/5/2017   Prostatic Specific Antigen Tot 6.17 (H) 0.00 - 4.00 ng/mL Final   Followed by Jefferson Garcia M.D..

## 2017-08-08 NOTE — ASSESSMENT & PLAN NOTE
Chronic condition managed with current medical regimen  Stable per review   Continue with current meds  Followed by Jefferson Garcia M.D..

## 2017-08-08 NOTE — ASSESSMENT & PLAN NOTE
Chronic condition managed with current medical regimen  Stable per review with rare flares   Continue with current meds  Followed by Jefferson Garcia M.D..

## 2017-08-08 NOTE — ASSESSMENT & PLAN NOTE
Chronic condition managed with current medical regimen  Stable per review, denies aching, burning or pain   Continue with surveillance  Followed by Jefferson Garcia M.D..

## 2017-08-08 NOTE — ASSESSMENT & PLAN NOTE
Chronic condition managed with current medical regimen  Labs reviewed    Continue with current meds  Followed by Jefferson Garcia M.D..

## 2017-08-14 RX ORDER — LOVASTATIN 20 MG/1
TABLET ORAL
Qty: 270 TAB | Refills: 3 | Status: SHIPPED | OUTPATIENT
Start: 2017-08-14 | End: 2018-09-20 | Stop reason: SDUPTHER

## 2017-08-15 ENCOUNTER — PATIENT MESSAGE (OUTPATIENT)
Dept: MEDICAL GROUP | Facility: MEDICAL CENTER | Age: 77
End: 2017-08-15

## 2017-08-15 RX ORDER — MECLIZINE HYDROCHLORIDE 25 MG/1
25 TABLET ORAL 3 TIMES DAILY PRN
Qty: 30 TAB | Refills: 0 | Status: SHIPPED | OUTPATIENT
Start: 2017-08-15 | End: 2017-09-09

## 2017-08-15 NOTE — TELEPHONE ENCOUNTER
From: Primo Ca  To: Jefferson Garcia M.D.  Sent: 8/15/2017 2:13 PM PDT  Subject: Non-Urgent Medical Question    Dr Bloom. I am scheduled to FLY to [place] tomorrow PM for 1 month and hope to do some boating. I have 18 of 30 Meclizine 25 mg pills left from ER visit 2 weeks ago. I saw Dr Munson last week and am scheduled for ear test in SEP. I had a minor episode yesterday PM, took a pill and did the Epley maneuver and it subsided overnight. I will continuue the exercises and would feel comfy with a few more pills (18 in hand might be enough) on hand just in case. Pls call in [facility] on [address].

## 2017-09-06 ENCOUNTER — OFFICE VISIT (OUTPATIENT)
Dept: MEDICAL GROUP | Facility: MEDICAL CENTER | Age: 77
End: 2017-09-06
Payer: MEDICARE

## 2017-09-06 ENCOUNTER — HOSPITAL ENCOUNTER (OUTPATIENT)
Facility: MEDICAL CENTER | Age: 77
End: 2017-09-06
Attending: NURSE PRACTITIONER
Payer: MEDICARE

## 2017-09-06 VITALS
RESPIRATION RATE: 16 BRPM | HEIGHT: 70 IN | SYSTOLIC BLOOD PRESSURE: 124 MMHG | OXYGEN SATURATION: 94 % | DIASTOLIC BLOOD PRESSURE: 74 MMHG | TEMPERATURE: 98 F | HEART RATE: 75 BPM | BODY MASS INDEX: 32.93 KG/M2 | WEIGHT: 230 LBS

## 2017-09-06 DIAGNOSIS — R35.0 URINARY FREQUENCY: ICD-10-CM

## 2017-09-06 DIAGNOSIS — R31.9 HEMATURIA: ICD-10-CM

## 2017-09-06 DIAGNOSIS — E11.40 TYPE 2 DIABETES, CONTROLLED, WITH NEUROPATHY (HCC): ICD-10-CM

## 2017-09-06 LAB
APPEARANCE UR: NORMAL
BILIRUB UR STRIP-MCNC: NORMAL MG/DL
COLOR UR AUTO: NORMAL
GLUCOSE UR STRIP.AUTO-MCNC: NORMAL MG/DL
KETONES UR STRIP.AUTO-MCNC: NORMAL MG/DL
LEUKOCYTE ESTERASE UR QL STRIP.AUTO: NORMAL
NITRITE UR QL STRIP.AUTO: NORMAL
PH UR STRIP.AUTO: 5 [PH] (ref 5–8)
PROT UR QL STRIP: 2000> MG/DL
RBC UR QL AUTO: NORMAL
SP GR UR STRIP.AUTO: 1.03
UROBILINOGEN UR STRIP-MCNC: NORMAL MG/DL

## 2017-09-06 PROCEDURE — 87086 URINE CULTURE/COLONY COUNT: CPT

## 2017-09-06 PROCEDURE — 81002 URINALYSIS NONAUTO W/O SCOPE: CPT | Performed by: NURSE PRACTITIONER

## 2017-09-06 PROCEDURE — 99214 OFFICE O/P EST MOD 30 MIN: CPT | Performed by: NURSE PRACTITIONER

## 2017-09-06 PROCEDURE — 99000 SPECIMEN HANDLING OFFICE-LAB: CPT | Performed by: NURSE PRACTITIONER

## 2017-09-06 RX ORDER — CIPROFLOXACIN 500 MG/1
500 TABLET, FILM COATED ORAL 2 TIMES DAILY
Qty: 20 TAB | Refills: 0 | Status: SHIPPED | OUTPATIENT
Start: 2017-09-06 | End: 2017-09-09

## 2017-09-06 NOTE — PROGRESS NOTES
Subjective:   No chief complaint on file.    Primo Ca is a 77 y.o. Male Established patient of Dr. Garcia here to discuss concerns with urinary frequency. He was recently on vacation in [place] visiting family, while there he started feeling generally poor. In the last 2 days he's noted that he needs to urinate very frequently, he is passing small amounts of urine. His energy level is poor. He's had a urinary tract infection in the past and is concerned he may currently have an infection. He is a type II diabetic reports that his blood sugar has been fine recently. His last A1c was well controlled for age at 7.4.  Denies dysuria, difficulty initiating urine stream, dribbling after urination, gross hematuria, abdominal pain, nausea, cough, back pain, history of kidney stones. He has had some subjective fever and chills    Current medicines (including changes today)  Current Outpatient Prescriptions   Medication Sig Dispense Refill   • ciprofloxacin (CIPRO) 500 MG Tab Take 1 Tab by mouth 2 times a day. 20 Tab 0   • meclizine (ANTIVERT) 25 MG Tab Take 1 Tab by mouth 3 times a day as needed (vertigo). No driving, no drinking alcohol. 30 Tab 0   • lovastatin (MEVACOR) 20 MG Tab TAKE 3 TABLETS BY MOUTH EVERY  Tab 3   • POTASSIUM PO Take  by mouth.     • hydrochlorothiazide (HYDRODIURIL) 25 MG Tab TAKE 1 TABLET BY MOUTH EVERY DAY 90 Tab 3   • JANUMET  MG per tablet TAKE 1 TABLET TWICE DAILY  WITH MEALS 180 Tab 1   • lisinopril (PRINIVIL) 20 MG Tab TAKE 1 TABLET BY MOUTH TWICE DAILY 180 Tab 3   • diltiazem CD (CARTIA XT) 240 MG CAPSULE SR 24 HR Take 1 Cap by mouth every day. 90 Cap 3   • indomethacin (INDOCIN) 50 MG Cap Take 50 mg by mouth 3 times a day.     • fluticasone (FLONASE) 50 MCG/ACT nasal spray Spray 2 Sprays in nose every day. 16 g 3   • Cholecalciferol (VITAMIN D) 2000 UNITS Cap Take 2,000 Units by mouth every day.     • aspirin EC (ECOTRIN) 81 MG Tablet Delayed Response Take 81 mg by  "mouth every evening.     • atenolol (TENORMIN) 50 MG Tab TAKE 1 TABLET BY MOUTH TWICE DAILY 180 Tab 3   • Alpha-Lipoic Acid 600 MG CAPS Take 1 Cap by mouth every day.     • cyanocobalamin (VITAMIN B-12) 500 MCG TABS Take 500 mcg by mouth every day.     • docosahexanoic acid (FISH OIL) 1000 MG CAPS Take 1,000 mg by mouth every day.       No current facility-administered medications for this visit.      He  has a past medical history of Diabetes; Dyslipidemia; GERD (gastroesophageal reflux disease); Gout; Hypertension; Obesity; and Vertigo. He also has no past medical history of Encounter for long-term (current) use of other medications.    ROS included above     Objective:     Blood pressure 124/74, pulse 75, temperature 36.7 °C (98 °F), resp. rate 16, height 1.778 m (5' 10\"), weight 104.3 kg (230 lb), SpO2 94 %. Body mass index is 33 kg/m².     Physical Exam:  General: Alert, oriented in no acute distress.  Eye contact is good, speech is normal, affect calm  Lungs: clear to auscultation bilaterally, normal effort, no wheeze/ rhonchi/ rales.  CV: regular rate and rhythm, S1, S2, no murmur  Abdomen: soft, nontender, no CVAT  Ext: no edema, color normal, vascularity normal, temperature normal    Assessment and Plan:   The following treatment plan was discussed   1. Hematuria  UA with trace blood today, otherwise normal. We'll go ahead and start him on Cipro, await culture. Encouraged increase fluids, rest, notify me of any new concerns.   URINE CULTURE(NEW)    ciprofloxacin (CIPRO) 500 MG Tab   2. Urinary frequency     3. Type 2 diabetes, controlled, with neuropathy (CMS-HCC)  Adequately controlled for age, last A1c 7.4.       Followup: Pending culture         Please note that this dictation was created using voice recognition software. I have worked with consultants from the vendor as well as technical experts from [employer] to optimize the interface. I have made every reasonable attempt to correct obvious errors, " but I expect that there are errors of grammar and possibly content that I did not discover before finalizing the note.

## 2017-09-08 ENCOUNTER — TELEPHONE (OUTPATIENT)
Dept: MEDICAL GROUP | Facility: MEDICAL CENTER | Age: 77
End: 2017-09-08

## 2017-09-08 LAB
BACTERIA UR CULT: NORMAL
SIGNIFICANT IND 70042: NORMAL
SITE SITE: NORMAL
SOURCE SOURCE: NORMAL

## 2017-09-08 NOTE — TELEPHONE ENCOUNTER
----- Message from GRAEME Baxter sent at 9/8/2017  9:20 AM PDT -----  Please f/u with patient by phone to ensure mychart message is received    Primo,  Your urine culture is showing no bacterial growth. You may discontinue the antibiotic you were given.  If you're still feeling poorly please schedule an appointment for reevaluation.  Ary RAMOS

## 2017-09-09 ENCOUNTER — HOSPITAL ENCOUNTER (EMERGENCY)
Facility: MEDICAL CENTER | Age: 77
End: 2017-09-09
Attending: EMERGENCY MEDICINE
Payer: MEDICARE

## 2017-09-09 ENCOUNTER — APPOINTMENT (OUTPATIENT)
Dept: RADIOLOGY | Facility: MEDICAL CENTER | Age: 77
End: 2017-09-09
Attending: EMERGENCY MEDICINE
Payer: MEDICARE

## 2017-09-09 VITALS
SYSTOLIC BLOOD PRESSURE: 165 MMHG | WEIGHT: 231.48 LBS | HEART RATE: 83 BPM | HEIGHT: 70 IN | OXYGEN SATURATION: 93 % | RESPIRATION RATE: 20 BRPM | BODY MASS INDEX: 33.14 KG/M2 | TEMPERATURE: 98.7 F | DIASTOLIC BLOOD PRESSURE: 82 MMHG

## 2017-09-09 DIAGNOSIS — E87.1 HYPONATREMIA: ICD-10-CM

## 2017-09-09 DIAGNOSIS — I10 ESSENTIAL HYPERTENSION: ICD-10-CM

## 2017-09-09 DIAGNOSIS — R53.83 FATIGUE, UNSPECIFIED TYPE: ICD-10-CM

## 2017-09-09 DIAGNOSIS — E86.0 DEHYDRATION: ICD-10-CM

## 2017-09-09 DIAGNOSIS — E87.6 HYPOKALEMIA: ICD-10-CM

## 2017-09-09 LAB
ALBUMIN SERPL BCP-MCNC: 3.1 G/DL (ref 3.2–4.9)
ALBUMIN/GLOB SERPL: 0.8 G/DL
ALP SERPL-CCNC: 72 U/L (ref 30–99)
ALT SERPL-CCNC: 25 U/L (ref 2–50)
ANION GAP SERPL CALC-SCNC: 12 MMOL/L (ref 0–11.9)
AST SERPL-CCNC: 26 U/L (ref 12–45)
BASOPHILS # BLD AUTO: 0.4 % (ref 0–1.8)
BASOPHILS # BLD: 0.04 K/UL (ref 0–0.12)
BILIRUB SERPL-MCNC: 0.5 MG/DL (ref 0.1–1.5)
BNP SERPL-MCNC: 179 PG/ML (ref 0–100)
BUN SERPL-MCNC: 18 MG/DL (ref 8–22)
CALCIUM SERPL-MCNC: 8.3 MG/DL (ref 8.4–10.2)
CHLORIDE SERPL-SCNC: 90 MMOL/L (ref 96–112)
CO2 SERPL-SCNC: 28 MMOL/L (ref 20–33)
CREAT SERPL-MCNC: 1.11 MG/DL (ref 0.5–1.4)
EKG IMPRESSION: NORMAL
EOSINOPHIL # BLD AUTO: 0.12 K/UL (ref 0–0.51)
EOSINOPHIL NFR BLD: 1.1 % (ref 0–6.9)
ERYTHROCYTE [DISTWIDTH] IN BLOOD BY AUTOMATED COUNT: 41.9 FL (ref 35.9–50)
GFR SERPL CREATININE-BSD FRML MDRD: >60 ML/MIN/1.73 M 2
GLOBULIN SER CALC-MCNC: 4.1 G/DL (ref 1.9–3.5)
GLUCOSE SERPL-MCNC: 237 MG/DL (ref 65–99)
HCT VFR BLD AUTO: 34 % (ref 42–52)
HGB BLD-MCNC: 12.1 G/DL (ref 14–18)
IMM GRANULOCYTES # BLD AUTO: 0.06 K/UL (ref 0–0.11)
IMM GRANULOCYTES NFR BLD AUTO: 0.6 % (ref 0–0.9)
LYMPHOCYTES # BLD AUTO: 1.2 K/UL (ref 1–4.8)
LYMPHOCYTES NFR BLD: 11.4 % (ref 22–41)
MCH RBC QN AUTO: 30 PG (ref 27–33)
MCHC RBC AUTO-ENTMCNC: 35.6 G/DL (ref 33.7–35.3)
MCV RBC AUTO: 84.2 FL (ref 81.4–97.8)
MONOCYTES # BLD AUTO: 1.18 K/UL (ref 0–0.85)
MONOCYTES NFR BLD AUTO: 11.2 % (ref 0–13.4)
NEUTROPHILS # BLD AUTO: 7.91 K/UL (ref 1.82–7.42)
NEUTROPHILS NFR BLD: 75.3 % (ref 44–72)
NRBC # BLD AUTO: 0 K/UL
NRBC BLD AUTO-RTO: 0 /100 WBC
PLATELET # BLD AUTO: 433 K/UL (ref 164–446)
PMV BLD AUTO: 8.8 FL (ref 9–12.9)
POTASSIUM SERPL-SCNC: 3.1 MMOL/L (ref 3.6–5.5)
PROT SERPL-MCNC: 7.2 G/DL (ref 6–8.2)
RBC # BLD AUTO: 4.04 M/UL (ref 4.7–6.1)
SODIUM SERPL-SCNC: 130 MMOL/L (ref 135–145)
TROPONIN I SERPL-MCNC: 0.02 NG/ML (ref 0–0.04)
TSH SERPL DL<=0.005 MIU/L-ACNC: 0.47 UIU/ML (ref 0.35–5.5)
WBC # BLD AUTO: 10.5 K/UL (ref 4.8–10.8)

## 2017-09-09 PROCEDURE — 96361 HYDRATE IV INFUSION ADD-ON: CPT

## 2017-09-09 PROCEDURE — 85025 COMPLETE CBC W/AUTO DIFF WBC: CPT

## 2017-09-09 PROCEDURE — 700105 HCHG RX REV CODE 258: Performed by: EMERGENCY MEDICINE

## 2017-09-09 PROCEDURE — A9270 NON-COVERED ITEM OR SERVICE: HCPCS | Performed by: EMERGENCY MEDICINE

## 2017-09-09 PROCEDURE — 700111 HCHG RX REV CODE 636 W/ 250 OVERRIDE (IP)

## 2017-09-09 PROCEDURE — 700111 HCHG RX REV CODE 636 W/ 250 OVERRIDE (IP): Performed by: EMERGENCY MEDICINE

## 2017-09-09 PROCEDURE — 71010 DX-CHEST-PORTABLE (1 VIEW): CPT

## 2017-09-09 PROCEDURE — 84484 ASSAY OF TROPONIN QUANT: CPT

## 2017-09-09 PROCEDURE — 93005 ELECTROCARDIOGRAM TRACING: CPT | Performed by: EMERGENCY MEDICINE

## 2017-09-09 PROCEDURE — 99284 EMERGENCY DEPT VISIT MOD MDM: CPT

## 2017-09-09 PROCEDURE — 80053 COMPREHEN METABOLIC PANEL: CPT

## 2017-09-09 PROCEDURE — 96367 TX/PROPH/DG ADDL SEQ IV INF: CPT

## 2017-09-09 PROCEDURE — 700102 HCHG RX REV CODE 250 W/ 637 OVERRIDE(OP): Performed by: EMERGENCY MEDICINE

## 2017-09-09 PROCEDURE — 96365 THER/PROPH/DIAG IV INF INIT: CPT

## 2017-09-09 PROCEDURE — 83880 ASSAY OF NATRIURETIC PEPTIDE: CPT

## 2017-09-09 PROCEDURE — 84443 ASSAY THYROID STIM HORMONE: CPT

## 2017-09-09 RX ORDER — POTASSIUM CHLORIDE 7.45 MG/ML
10 INJECTION INTRAVENOUS ONCE
Status: COMPLETED | OUTPATIENT
Start: 2017-09-09 | End: 2017-09-09

## 2017-09-09 RX ORDER — SODIUM CHLORIDE, SODIUM LACTATE, POTASSIUM CHLORIDE, CALCIUM CHLORIDE 600; 310; 30; 20 MG/100ML; MG/100ML; MG/100ML; MG/100ML
INJECTION, SOLUTION INTRAVENOUS CONTINUOUS
Status: DISCONTINUED | OUTPATIENT
Start: 2017-09-09 | End: 2017-09-09 | Stop reason: HOSPADM

## 2017-09-09 RX ORDER — MAGNESIUM SULFATE HEPTAHYDRATE 40 MG/ML
INJECTION, SOLUTION INTRAVENOUS
Status: COMPLETED
Start: 2017-09-09 | End: 2017-09-09

## 2017-09-09 RX ORDER — POTASSIUM CHLORIDE 20 MEQ/1
40 TABLET, EXTENDED RELEASE ORAL ONCE
Status: COMPLETED | OUTPATIENT
Start: 2017-09-09 | End: 2017-09-09

## 2017-09-09 RX ORDER — SODIUM CHLORIDE 9 MG/ML
1000 INJECTION, SOLUTION INTRAVENOUS ONCE
Status: COMPLETED | OUTPATIENT
Start: 2017-09-09 | End: 2017-09-09

## 2017-09-09 RX ORDER — MAGNESIUM SULFATE HEPTAHYDRATE 40 MG/ML
2 INJECTION, SOLUTION INTRAVENOUS ONCE
Status: COMPLETED | OUTPATIENT
Start: 2017-09-09 | End: 2017-09-09

## 2017-09-09 RX ADMIN — SODIUM CHLORIDE 1000 ML: 9 INJECTION, SOLUTION INTRAVENOUS at 18:19

## 2017-09-09 RX ADMIN — SODIUM CHLORIDE, POTASSIUM CHLORIDE, SODIUM LACTATE AND CALCIUM CHLORIDE: 600; 310; 30; 20 INJECTION, SOLUTION INTRAVENOUS at 19:16

## 2017-09-09 RX ADMIN — MAGNESIUM SULFATE HEPTAHYDRATE 2 G: 40 INJECTION, SOLUTION INTRAVENOUS at 19:35

## 2017-09-09 RX ADMIN — MAGNESIUM SULFATE IN WATER 2 G: 40 INJECTION, SOLUTION INTRAVENOUS at 19:35

## 2017-09-09 RX ADMIN — POTASSIUM CHLORIDE 10 MEQ: 10 INJECTION, SOLUTION INTRAVENOUS at 19:16

## 2017-09-09 RX ADMIN — POTASSIUM CHLORIDE 40 MEQ: 1500 TABLET, EXTENDED RELEASE ORAL at 19:17

## 2017-09-09 ASSESSMENT — PAIN SCALES - GENERAL: PAINLEVEL_OUTOF10: 0

## 2017-09-10 ENCOUNTER — PATIENT OUTREACH (OUTPATIENT)
Dept: HEALTH INFORMATION MANAGEMENT | Facility: OTHER | Age: 77
End: 2017-09-10

## 2017-09-10 NOTE — ED PROVIDER NOTES
ED Provider Note    CHIEF COMPLAINT  No chief complaint on file.      HPI  Primo Ca is a 77 y.o. male who presents with fatigue worsening for the last 2+ weeks.    Location: No pain, feels diffusely fatigued.    Quality: General fatigue and occasional lightheadedness.    Severity: at worst moderate, at present moderate    Duration: onset 2+ weeks ago    Timing: constant, but worsening in the last 2 days    Context: returned from a fishing trip last week from [place] and has felt 'run down' since that time. Previously he's been dehydrated and this feels similar.     Modifying factors: no alleviating measures noted aside from sleep, exacerbated with exertion.    Associated signs/symptoms: denies cp, sob, vomiting, headache, LOC, syncope, abd pain, leg swelling, weight changes, fevers/chills, diaphoresis, rashes, wounds, PD/PU.    REVIEW OF SYSTEMS  Constitutional: Negative for fever and chills, but pos for fatigue  HENT: Negative for nosebleeds or headache.    Eyes: Negative for double vision and discharge.   Respiratory: Negative for cough. No SOB.  Cardiovascular: Negative for chest pain or palpitations.   Gastrointestinal: Negative for nausea or vomiting.   Genitourinary: Negative for dysuria or flank pain.   Musculoskeletal: Negative for back pain and joint pain.   Skin: Negative for itching and rash.   Neurological: Negative for dizziness, seizures and headaches. Occasional lightheadedness, no syncope.  Endo/Heme/Allergies: Negative for weight changes or hives.   Psychiatric/Behavioral: Negative for depression or suicidal ideas.     See HPI for further details. All other systems are negative.     PAST MEDICAL HISTORY   has a past medical history of Diabetes; Dyslipidemia; GERD (gastroesophageal reflux disease); Gout; Hypertension; Obesity; and Vertigo.    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    CURRENT MEDICATIONS  Home Medications     Reviewed by Edgard Shin R.N. (Registered Nurse) on 09/09/17 at 1707  Med List Status: Complete   Medication Last Dose Status   Alpha-Lipoic Acid 600 MG CAPS 9/9/2017 Active   aspirin EC (ECOTRIN) 81 MG Tablet Delayed Response 9/9/2017 Active   atenolol (TENORMIN) 50 MG Tab 9/9/2017 Active   Cholecalciferol (VITAMIN D) 2000 UNITS Cap 9/9/2017 Active   cyanocobalamin (VITAMIN B-12) 500 MCG TABS 9/9/2017 Active   diltiazem CD (CARTIA XT) 240 MG CAPSULE SR 24 HR 9/9/2017 Active   docosahexanoic acid (FISH OIL) 1000 MG CAPS 9/9/2017 Active   fluticasone (FLONASE) 50 MCG/ACT nasal spray prn Active   hydrochlorothiazide (HYDRODIURIL) 25 MG Tab 9/9/2017 Active   indomethacin (INDOCIN) 50 MG Cap prn Active   JANUMET  MG per tablet 9/9/2017 Active   lisinopril (PRINIVIL) 20 MG Tab 9/9/2017 Active   lovastatin (MEVACOR) 20 MG Tab 9/9/2017 Active   POTASSIUM PO 9/9/2017 Active                ALLERGIES  Allergies   Allergen Reactions   • Spironolactone      diarrhea       PHYSICAL EXAM  VITAL SIGNS: BP (!) 165/82   Pulse 83   Temp 37.1 °C (98.7 °F)   Resp 20   Ht 1.778 m (5' 10\") Comment: Stated  Wt 105 kg (231 lb 7.7 oz)   SpO2 93%   BMI 33.21 kg/m²    Pulse ox interpretation: I interpret this pulse ox as normal, on RA.  Constitutional: Alert and in no apparent distress.  HENT: No signs of trauma, Bilateral external ears normal, Nose normal.   Eyes: Pupils are equal and reactive, " Conjunctiva normal, Non-icteric.   Neck: Normal range of motion, No tenderness, Supple, No stridor.   Lymphatic: No lymphadenopathy noted.   Cardiovascular: Regular rate and rhythm, no murmurs.   Thorax & Lungs: Distant breath sounds, No respiratory distress, No wheezing, No chest tenderness.   Abdomen: Bowel sounds normal, Soft, No tenderness, No masses, No pulsatile masses. No peritoneal signs.  Skin: Warm, Dry, No erythema, No rash.   Back: No bony tenderness, No CVA tenderness.   Extremities: Intact distal pulses, No edema, No tenderness, No cyanosis.  Musculoskeletal: Good range of motion in all major joints. No tenderness to palpation or major deformities noted.   Neurologic: Alert, Normal motor function, Normal sensory function, No focal CN deficits noted.   Psychiatric: Affect normal, Judgment normal, Mood normal.       DIAGNOSTIC STUDIES / PROCEDURES    LABS & EKG  Results for orders placed or performed during the hospital encounter of 09/09/17   CBC w/ Differential   Result Value Ref Range    WBC 10.5 4.8 - 10.8 K/uL    RBC 4.04 (L) 4.70 - 6.10 M/uL    Hemoglobin 12.1 (L) 14.0 - 18.0 g/dL    Hematocrit 34.0 (L) 42.0 - 52.0 %    MCV 84.2 81.4 - 97.8 fL    MCH 30.0 27.0 - 33.0 pg    MCHC 35.6 (H) 33.7 - 35.3 g/dL    RDW 41.9 35.9 - 50.0 fL    Platelet Count 433 164 - 446 K/uL    MPV 8.8 (L) 9.0 - 12.9 fL    Neutrophils-Polys 75.30 (H) 44.00 - 72.00 %    Lymphocytes 11.40 (L) 22.00 - 41.00 %    Monocytes 11.20 0.00 - 13.40 %    Eosinophils 1.10 0.00 - 6.90 %    Basophils 0.40 0.00 - 1.80 %    Immature Granulocytes 0.60 0.00 - 0.90 %    Nucleated RBC 0.00 /100 WBC    Neutrophils (Absolute) 7.91 (H) 1.82 - 7.42 K/uL    Lymphs (Absolute) 1.20 1.00 - 4.80 K/uL    Monos (Absolute) 1.18 (H) 0.00 - 0.85 K/uL    Eos (Absolute) 0.12 0.00 - 0.51 K/uL    Baso (Absolute) 0.04 0.00 - 0.12 K/uL    Immature Granulocytes (abs) 0.06 0.00 - 0.11 K/uL    NRBC (Absolute) 0.00 K/uL   Complete Metabolic Panel (CMP)   Result Value  Ref Range    Sodium 130 (L) 135 - 145 mmol/L    Potassium 3.1 (L) 3.6 - 5.5 mmol/L    Chloride 90 (L) 96 - 112 mmol/L    Co2 28 20 - 33 mmol/L    Anion Gap 12.0 (H) 0.0 - 11.9    Glucose 237 (H) 65 - 99 mg/dL    Bun 18 8 - 22 mg/dL    Creatinine 1.11 0.50 - 1.40 mg/dL    Calcium 8.3 (L) 8.4 - 10.2 mg/dL    AST(SGOT) 26 12 - 45 U/L    ALT(SGPT) 25 2 - 50 U/L    Alkaline Phosphatase 72 30 - 99 U/L    Total Bilirubin 0.5 0.1 - 1.5 mg/dL    Albumin 3.1 (L) 3.2 - 4.9 g/dL    Total Protein 7.2 6.0 - 8.2 g/dL    Globulin 4.1 (H) 1.9 - 3.5 g/dL    A-G Ratio 0.8 g/dL   Troponin STAT   Result Value Ref Range    Troponin I 0.02 0.00 - 0.04 ng/mL   Btype Natriuretic Peptide   Result Value Ref Range    B Natriuretic Peptide 179 (H) 0 - 100 pg/mL   TSH (for screening thyroid dysfunction)   Result Value Ref Range    TSH 0.470 0.350 - 5.500 uIU/mL   ESTIMATED GFR   Result Value Ref Range    GFR If African American >60 >60 mL/min/1.73 m 2    GFR If Non African American >60 >60 mL/min/1.73 m 2   EKG (NOW)   Result Value Ref Range    Report       [facility] Emergency Dept.    Test Date:  2017  Pt Name:    MERCEDEZ MCDONALD               Department: [facility]  MRN:        [id number]                      [facility]  Gender:     M                            Technician: GAEL  :        [id number]                   Requested By:PIPE SALAS  Order #:    [id number]                    Reading MD: Pipe Salas MD    Measurements  Intervals                                Axis  Rate:       78                           P:          32  PA:         208                          QRS:        13  QRSD:       118                          T:          -12  QT:         420  QTc:        479    Interpretive Statements  SINUS RHYTHM  INCOMPLETE RIGHT BUNDLE BRANCH BLOCK  1st deg AV block  no stemi, strain, or dysrhythmia    Compared to ECG 2017 15:41:20  No significant change  Electronically Signed On 2017  18:48:30 PDT by Pipe Sagastume MD         RADIOLOGY  DX-CHEST-PORTABLE (1 VIEW)   Final Result      Bibasilar atelectasis.      Cardiomegaly.        COURSE & MEDICAL DECISION MAKING    This is a 77 y.o. male who presents with fatigue. AFVSS.    Differential Diagnosis includes but is not limited to:  KIM, lyte abnormality, CHF, ACS, thyroid abnormality, dehydration, anemia    ED Course:  Plan labs, imaging, ekg, fluids for concern of dehydration, reeval.   Labs w/ e/o hyponatremia, hypoK, but no e/o severe acidosis or hepatobiliary disease.  ekg nsr no stemi or strain, trop neg doubt acs.  No edema on cxr, mild cardiomegaly, no pna. BNP minimally elevated.  Pt feeling better with IV fluids. 2L crystalloid and mag and K repleted.  Tolerating PO, AFVSS, no e/o anemia. Resuscitated for dehydration with iv fluids, and feels much better. Many prior episodes. TSH WNL doubt thyroid disease but d/w patient need for outpatient endo workup to find true etiology. Poor diet on further questioning from wife. Has had hypoNa on prior records review. Recommend leafy greens, and jones outpatient f/u at clinic in 2 days for lab recheck to ensure lytes are stable. Pt and wife agree with and appreciate plan and will return if he feels any worse.    Pertinent Labs & Imaging studies reviewed and verified by myself, as well as nursing notes and the patient's past medical, family, and social histories (See chart for details).    Medications   NS infusion 1,000 mL (0 mL Intravenous Stopped 9/9/17 1958)   potassium chloride in water (KCL) ivpb 10 mEq (0 mEq Intravenous Stopped 9/9/17 2016)   potassium chloride SA (Kdur) tablet 40 mEq (40 mEq Oral Given 9/9/17 1917)   magnesium sulfate IVPB premix 2 g (0 g Intravenous Stopped 9/9/17 2131)       FINAL IMPRESSION  1. Dehydration    2. Hyponatremia    3. Hypokalemia    4. Fatigue, unspecified type    5. Essential hypertension        PRESCRIPTIONS  Discharge Medication List as of 9/9/2017  9:31  PM          FOLLOW UP  Jefferson Garcia M.D.  [address] [facility] [id number]  [place] [id number] [phone]    Schedule an appointment as soon as possible for a visit in 2 days      [facility], Emergency Dept  [address] [facility]  [place] [id number] [phone]  Today  If symptoms worsen      -DISCHARGE-    Results, exam findings, clinical impression, presumed diagnosis, treatment options, and strict return precautions were discussed with the patient and family, and they verbalized understanding, agreed with, and appreciated the plan of care.    Electronically signed by Ppie Sagastume on 9/10/2017 at 8:55 AM.

## 2017-09-10 NOTE — DISCHARGE INSTRUCTIONS
You were seen and evaluated in the Emergency Department at [facility] for:     Fatigue    You had the following tests and studies:    EKG, blood tests. You had low electrolytes and we repleted them. Please get your blood checked on Monday but return if you feel any worse! Try eating more leafy greens and continue taking electrolyte mix.     -------------------    Please make sure to follow up with:    Your primary care doctor on Monday.   ----------------------------    We always encourage patients to return IMMEDIATELY if they have:  Increased or changing pain, passing out, fevers over 100.4 (taken in your mouth or rectally) for more than 2 days, redness or swelling of skin or tissues, feeling like your heart is beating fast, chest pain that is new or worsening, trouble breathing, feeling like your throat is closing up and can not breath, inability to walk, weakness of any part of your body, new dizziness, severe bleeding that won't stop from any part of your body, if you can't eat or drink, or if you have any other concerns.   If you feel worse, please know that you can always return with any questions, concerns, worse symptoms, or you are feeling unsafe. We certainly cannot say for sure that we have ruled out every illness or dangerous disease, but we feel that at this specific time, your exam, tests, and vital signs like heart rate and blood pressure are safe for discharge.       Dehydration, Adult  Dehydration is when you lose more fluids from the body than you take in. Vital organs like the kidneys, brain, and heart cannot function without a proper amount of fluids and salt. Any loss of fluids from the body can cause dehydration.   CAUSES   · Vomiting.  · Diarrhea.  · Excessive sweating.  · Excessive urine output.  · Fever.  SYMPTOMS   Mild dehydration  · Thirst.  · Dry lips.  · Slightly dry mouth.  Moderate dehydration  · Very dry mouth.  · Sunken eyes.  · Skin does not bounce back quickly when  lightly pinched and released.  · Dark urine and decreased urine production.  · Decreased tear production.  · Headache.  Severe dehydration  · Very dry mouth.  · Extreme thirst.  · Rapid, weak pulse (more than 100 beats per minute at rest).  · Cold hands and feet.  · Not able to sweat in spite of heat and temperature.  · Rapid breathing.  · Blue lips.  · Confusion and lethargy.  · Difficulty being awakened.  · Minimal urine production.  · No tears.  DIAGNOSIS   Your caregiver will diagnose dehydration based on your symptoms and your exam. Blood and urine tests will help confirm the diagnosis. The diagnostic evaluation should also identify the cause of dehydration.  TREATMENT   Treatment of mild or moderate dehydration can often be done at home by increasing the amount of fluids that you drink. It is best to drink small amounts of fluid more often. Drinking too much at one time can make vomiting worse. Refer to the home care instructions below.  Severe dehydration needs to be treated at the hospital where you will probably be given intravenous (IV) fluids that contain water and electrolytes.  HOME CARE INSTRUCTIONS   · Ask your caregiver about specific rehydration instructions.  · Drink enough fluids to keep your urine clear or pale yellow.  · Drink small amounts frequently if you have nausea and vomiting.  · Eat as you normally do.  · Avoid:  ¨ Foods or drinks high in sugar.  ¨ Carbonated drinks.  ¨ Juice.  ¨ Extremely hot or cold fluids.  ¨ Drinks with caffeine.  ¨ Fatty, greasy foods.  ¨ Alcohol.  ¨ Tobacco.  ¨ Overeating.  ¨ Gelatin desserts.  · Wash your hands well to avoid spreading bacteria and viruses.  · Only take over-the-counter or prescription medicines for pain, discomfort, or fever as directed by your caregiver.  · Ask your caregiver if you should continue all prescribed and over-the-counter medicines.  · Keep all follow-up appointments with your caregiver.  SEEK MEDICAL CARE IF:  · You have abdominal  pain and it increases or stays in one area (localizes).  · You have a rash, stiff neck, or severe headache.  · You are irritable, sleepy, or difficult to awaken.  · You are weak, dizzy, or extremely thirsty.  SEEK IMMEDIATE MEDICAL CARE IF:   · You are unable to keep fluids down or you get worse despite treatment.  · You have frequent episodes of vomiting or diarrhea.  · You have blood or green matter (bile) in your vomit.  · You have blood in your stool or your stool looks black and tarry.  · You have not urinated in 6 to 8 hours, or you have only urinated a small amount of very dark urine.  · You have a fever.  · You faint.  MAKE SURE YOU:   · Understand these instructions.  · Will watch your condition.  · Will get help right away if you are not doing well or get worse.     This information is not intended to replace advice given to you by your health care provider. Make sure you discuss any questions you have with your health care provider.     Document Released: 12/18/2006 Document Revised: 03/11/2013 Document Reviewed: 08/06/2012  [employer] Interactive Patient Education ©2016 [employer]      Hypertension  Hypertension is another name for high blood pressure. High blood pressure forces your heart to work harder to pump blood. A blood pressure reading has two numbers, which includes a higher number over a lower number (example: 110/72).  HOME CARE   · Have your blood pressure rechecked by your doctor.  · Only take medicine as told by your doctor. Follow the directions carefully. The medicine does not work as well if you skip doses. Skipping doses also puts you at risk for problems.  · Do not smoke.  · Monitor your blood pressure at home as told by your doctor.  GET HELP IF:  · You think you are having a reaction to the medicine you are taking.  · You have repeat headaches or feel dizzy.  · You have puffiness (swelling) in your ankles.  · You have trouble with your vision.  GET HELP RIGHT AWAY IF:   · You get a  very bad headache and are confused.  · You feel weak, numb, or faint.  · You get chest or belly (abdominal) pain.  · You throw up (vomit).  · You cannot breathe very well.  MAKE SURE YOU:   · Understand these instructions.  · Will watch your condition.  · Will get help right away if you are not doing well or get worse.     This information is not intended to replace advice given to you by your health care provider. Make sure you discuss any questions you have with your health care provider.     Document Released: 06/05/2009 Document Revised: 12/23/2014 Document Reviewed: 10/10/2014  [employer] Interactive Patient Education ©2016 [employer]    Hypokalemia  Hypokalemia means a low potassium level in the blood. Symptoms may include muscle weakness and cramping, fatigue, abdominal pain, vomiting, constipation, or irregularities of the heartbeat. Sometimes hypokalemia is discovered by your caregiver if you are taking certain medicines for high blood pressure or kidney disease.   Potassium is an electrolyte that helps regulate the amount of fluid in the body. It also stimulates muscle contraction and maintains a stable acid-base balance. If potassium levels go too low or too high, your health may be in danger. You are at risk for developing shock, heart, and lung problems. Hypokalemia can occur if you have excessive diarrhea, vomiting, or sweating. Potassium can be lost through your kidneys in the urine. Certain common medicines can also cause potassium loss, especially water pills (diuretics). The same is possible with cortisone medications or certain types of antibiotics. Low potassium can be dangerous if you are taking certain heart medicines. In diabetes, your potassium may fall after you take insulin, especially if your diabetes had been out of control for a while. In rare cases, potassium may be low because you are not getting enough in your diet.   In adults, a potassium level below 3.5 mEq/L is usually considered  low.  Hypokalemia can be treated with potassium supplements taken by mouth and a diet that is high in potassium. Foods with high potassium content are:  · Peas, lentils, lima beans, nuts, and dried fruit.   · Whole grain and bran cereals and breads.   · Fresh fruit and vegetables. Examples include:   · Bananas.   · Cantaloupe.   · Grapefruit.   · Oranges.   · Tomatoes.   · Honeydew melons.   · Potatoes.   · Peaches.   · Orange and tomato juices.   · Meats.   See your caregiver as instructed for a follow-up blood test to be sure your potassium is back to normal.  SEEK MEDICAL CARE IF:   · You have nausea, vomiting, constipation, or abdominal pain.   · You have palpitations or irregular heartbeats, chest pain or shortness of breath.   · You have muscle cramps or weakness or fatigue.   · You have lethargy.   SEEK IMMEDIATE MEDICAL CARE IF:   · You have paralysis.   · You have confusion or other mental status changes.   Document Released: 12/18/2006 Document Revised: 03/11/2013 Document Reviewed: 04/13/2011  [employer] Patient Information ©2013 [employer].    Hyponatremia  Hyponatremia is when the amount of salt (sodium) in your blood is too low. When salt levels are low, your cells absorb extra water and they swell. The swelling happens throughout the body, but it mostly affects the brain.   HOME CARE  · Take medicines only as told by your doctor. Many medicines can make this condition worse. Talk with your doctor about any medicines that you are currently taking.  · Carefully follow a recommended diet as told by your doctor.  · Carefully follow instructions from your doctor about fluid restrictions.  · Keep all follow-up visits as told by your doctor. This is important.  · Do not drink alcohol.  GET HELP IF:  · You feel sicker to your stomach (nauseous).  · You feel more confused.  · You feel more tired (fatigued).  · Your headache gets worse.  · You feel weaker.  · Your symptoms go away and then they come  back.  · You have trouble following the diet instructions.  GET HELP RIGHT AWAY IF:  · You start to twitch and shake (have a seizure).  · You pass out (faint).  · You keep having watery poop (diarrhea).  · You keep throwing up (vomiting).     This information is not intended to replace advice given to you by your health care provider. Make sure you discuss any questions you have with your health care provider.     Document Released: 08/29/2012 Document Revised: 05/03/2016 Document Reviewed: 12/14/2015  [employer] Interactive Patient Education ©2016 [employer]

## 2017-09-10 NOTE — ED NOTES
Patient was recently in [place] and has not been doing well since he came home, he feels like he is very dehydrated.

## 2017-09-11 ENCOUNTER — TELEPHONE (OUTPATIENT)
Dept: MEDICAL GROUP | Facility: MEDICAL CENTER | Age: 77
End: 2017-09-11

## 2017-09-11 DIAGNOSIS — E78.5 DYSLIPIDEMIA: ICD-10-CM

## 2017-09-11 NOTE — TELEPHONE ENCOUNTER
1. Caller Name: Pt                      Call Back Number: [phone] (home)     2. Message: Pt states he was told to get blood work done before follow up appt. He is scheduled for 9/13/2017. Specifically the Triglycerides , and anything else you feel we should check     3. Patient approves office to leave a detailed voicemail/MyChart message: yes

## 2017-09-11 NOTE — TELEPHONE ENCOUNTER
Fasting labs ordered. We need to follow-up his potassium and sodium from the hospital visit recently.  It is too early for us to check his diabetes test, because it has only been 6 months and the insurance requires at least 3 months.   Jefferson Garcia M.D.

## 2017-09-12 ENCOUNTER — HOSPITAL ENCOUNTER (OUTPATIENT)
Dept: LAB | Facility: MEDICAL CENTER | Age: 77
End: 2017-09-12
Attending: FAMILY MEDICINE
Payer: MEDICARE

## 2017-09-12 DIAGNOSIS — E78.5 DYSLIPIDEMIA: ICD-10-CM

## 2017-09-12 LAB
ALBUMIN SERPL BCP-MCNC: 3.3 G/DL (ref 3.2–4.9)
ALBUMIN/GLOB SERPL: 0.8 G/DL
ALP SERPL-CCNC: 63 U/L (ref 30–99)
ALT SERPL-CCNC: 27 U/L (ref 2–50)
ANION GAP SERPL CALC-SCNC: 8 MMOL/L (ref 0–11.9)
AST SERPL-CCNC: 22 U/L (ref 12–45)
BILIRUB SERPL-MCNC: 0.6 MG/DL (ref 0.1–1.5)
BUN SERPL-MCNC: 12 MG/DL (ref 8–22)
CALCIUM SERPL-MCNC: 9.3 MG/DL (ref 8.5–10.5)
CHLORIDE SERPL-SCNC: 96 MMOL/L (ref 96–112)
CHOLEST SERPL-MCNC: 108 MG/DL (ref 100–199)
CO2 SERPL-SCNC: 29 MMOL/L (ref 20–33)
CREAT SERPL-MCNC: 0.95 MG/DL (ref 0.5–1.4)
GFR SERPL CREATININE-BSD FRML MDRD: >60 ML/MIN/1.73 M 2
GLOBULIN SER CALC-MCNC: 4 G/DL (ref 1.9–3.5)
GLUCOSE SERPL-MCNC: 178 MG/DL (ref 65–99)
HDLC SERPL-MCNC: 22 MG/DL
LDLC SERPL CALC-MCNC: 60 MG/DL
POTASSIUM SERPL-SCNC: 4.2 MMOL/L (ref 3.6–5.5)
PROT SERPL-MCNC: 7.3 G/DL (ref 6–8.2)
SODIUM SERPL-SCNC: 133 MMOL/L (ref 135–145)
TRIGL SERPL-MCNC: 130 MG/DL (ref 0–149)

## 2017-09-12 PROCEDURE — 36415 COLL VENOUS BLD VENIPUNCTURE: CPT

## 2017-09-12 PROCEDURE — 80053 COMPREHEN METABOLIC PANEL: CPT

## 2017-09-12 PROCEDURE — 80061 LIPID PANEL: CPT

## 2017-09-13 ENCOUNTER — OFFICE VISIT (OUTPATIENT)
Dept: MEDICAL GROUP | Facility: MEDICAL CENTER | Age: 77
End: 2017-09-13
Payer: MEDICARE

## 2017-09-13 VITALS
WEIGHT: 230 LBS | TEMPERATURE: 98.6 F | OXYGEN SATURATION: 96 % | DIASTOLIC BLOOD PRESSURE: 82 MMHG | BODY MASS INDEX: 32.93 KG/M2 | RESPIRATION RATE: 16 BRPM | HEIGHT: 70 IN | HEART RATE: 86 BPM | SYSTOLIC BLOOD PRESSURE: 130 MMHG

## 2017-09-13 DIAGNOSIS — E66.9 OBESITY (BMI 30-39.9): ICD-10-CM

## 2017-09-13 DIAGNOSIS — Z23 NEED FOR IMMUNIZATION AGAINST INFLUENZA: ICD-10-CM

## 2017-09-13 DIAGNOSIS — E11.40 TYPE 2 DIABETES, CONTROLLED, WITH NEUROPATHY (HCC): ICD-10-CM

## 2017-09-13 DIAGNOSIS — I10 ESSENTIAL HYPERTENSION: ICD-10-CM

## 2017-09-13 DIAGNOSIS — E78.5 DYSLIPIDEMIA: ICD-10-CM

## 2017-09-13 PROCEDURE — 99214 OFFICE O/P EST MOD 30 MIN: CPT | Mod: 25 | Performed by: FAMILY MEDICINE

## 2017-09-13 PROCEDURE — 99999 PR NO CHARGE: CPT | Performed by: FAMILY MEDICINE

## 2017-09-13 PROCEDURE — 90662 IIV NO PRSV INCREASED AG IM: CPT | Performed by: FAMILY MEDICINE

## 2017-09-13 PROCEDURE — G0008 ADMIN INFLUENZA VIRUS VAC: HCPCS | Performed by: FAMILY MEDICINE

## 2017-09-13 NOTE — ASSESSMENT & PLAN NOTE
Patient is tolerating atenolol 50 mg daily, diltiazem 240 mg daily, HCTZ 25 mg daily and lisinopril 20 mg daily.  He did come down with dehydration and a few days ago and had to be treated at the ER with IVs. He has learned about Pedialyte and is now drinking Pedialyte regularly.  His found to have hypokalemia in the emergency department. We rechecked his potassium and it was normal.

## 2017-09-13 NOTE — PROGRESS NOTES
Subjective:   Primo Ca is a 77 y.o. male here today for hypertension, diabetes    Dyslipidemia  Patient is tolerating lovastatin 20 mg daily.    HTN (hypertension)  Patient is tolerating atenolol 50 mg daily, diltiazem 240 mg daily, HCTZ 25 mg daily and lisinopril 20 mg daily.  He did come down with dehydration and a few days ago and had to be treated at the ER with IVs. He has learned about Pedialyte and is now drinking Pedialyte regularly.  His found to have hypokalemia in the emergency department. We rechecked his potassium and it was normal.    Type 2 diabetes, controlled, with neuropathy  Patient is tolerating diabetic medication. His last A1c was 7.4, 2 months ago.  He has been drinking electrolyte replacements. He is not sure about the sugar content in these drinks.    Obesity (BMI 30-39.9)  Patient has not been walking regularly for the last few weeks.         Current medicines (including changes today)  Current Outpatient Prescriptions   Medication Sig Dispense Refill   • lovastatin (MEVACOR) 20 MG Tab TAKE 3 TABLETS BY MOUTH EVERY  Tab 3   • POTASSIUM PO Take  by mouth.     • hydrochlorothiazide (HYDRODIURIL) 25 MG Tab TAKE 1 TABLET BY MOUTH EVERY DAY 90 Tab 3   • JANUMET  MG per tablet TAKE 1 TABLET TWICE DAILY  WITH MEALS 180 Tab 1   • lisinopril (PRINIVIL) 20 MG Tab TAKE 1 TABLET BY MOUTH TWICE DAILY 180 Tab 3   • diltiazem CD (CARTIA XT) 240 MG CAPSULE SR 24 HR Take 1 Cap by mouth every day. 90 Cap 3   • indomethacin (INDOCIN) 50 MG Cap Take 50 mg by mouth 3 times a day.     • fluticasone (FLONASE) 50 MCG/ACT nasal spray Spray 2 Sprays in nose every day. 16 g 3   • Cholecalciferol (VITAMIN D) 2000 UNITS Cap Take 2,000 Units by mouth every day.     • aspirin EC (ECOTRIN) 81 MG Tablet Delayed Response Take 81 mg by mouth every evening.     • atenolol (TENORMIN) 50 MG Tab TAKE 1 TABLET BY MOUTH TWICE DAILY 180 Tab 3   • Alpha-Lipoic Acid 600 MG CAPS Take 1 Cap by mouth every  "day.     • cyanocobalamin (VITAMIN B-12) 500 MCG TABS Take 500 mcg by mouth every day.     • docosahexanoic acid (FISH OIL) 1000 MG CAPS Take 1,000 mg by mouth every day.       No current facility-administered medications for this visit.      He  has a past medical history of Diabetes; Dyslipidemia; GERD (gastroesophageal reflux disease); Gout; Hypertension; Obesity; and Vertigo. He also has no past medical history of Encounter for long-term (current) use of other medications.    ROS   No chest pain, no shortness of breath       Objective:     Blood pressure 130/82, pulse 86, temperature 37 °C (98.6 °F), resp. rate 16, height 1.778 m (5' 10\"), weight 104.3 kg (230 lb), SpO2 96 %. Body mass index is 33 kg/m².   Physical Exam:  Constitutional: Alert, no distress.  Skin: Warm, dry, good turgor, no rashes in visible areas.  Eye: Equal, round and reactive, conjunctiva clear, lids normal.  Respiratory: Unlabored respiratory effort, lungs clear to auscultation, no wheezes, no ronchi.  Cardiovascular: Normal S1, S2, no murmur, no edema.  Psych: Alert and oriented x3, normal affect and mood.        Assessment and Plan:   The following treatment plan was discussed    1. Dyslipidemia  Controlled LDL. Continue lovastatin.    2. Essential hypertension  Controlled. Continue current medications. Advised patient to hydrate well because he is on a diuretic. Continue over-the-counter potassium supplementation.    3. Type 2 diabetes, controlled, with neuropathy (CMS-[facility])  Cautioned patient on sugary drinks. Follow up with labs in 2 months.    4. Obesity (BMI 30-39.9)  - Patient identified as having weight management issue.  Appropriate orders and counseling given.    5. Need for immunization against influenza  - INFLUENZA VACCINE, HIGH DOSE (65+ ONLY)      Followup: Return in about 2 months (around 11/13/2017), or if symptoms worsen or fail to improve, for Diabetes, Short.         "

## 2017-09-13 NOTE — ASSESSMENT & PLAN NOTE
Patient is tolerating diabetic medication. His last A1c was 7.4, 2 months ago.  He has been drinking electrolyte replacements. He is not sure about the sugar content in these drinks.

## 2017-09-20 ENCOUNTER — OFFICE VISIT (OUTPATIENT)
Dept: OTHER | Facility: IMAGING CENTER | Age: 77
End: 2017-09-20
Payer: MEDICARE

## 2017-09-20 DIAGNOSIS — M19.032 PRIMARY OSTEOARTHRITIS OF BOTH WRISTS: ICD-10-CM

## 2017-09-20 DIAGNOSIS — M19.031 PRIMARY OSTEOARTHRITIS OF BOTH WRISTS: ICD-10-CM

## 2017-09-20 DIAGNOSIS — Z86.39 HISTORY OF DEHYDRATION: ICD-10-CM

## 2017-09-20 DIAGNOSIS — Z87.898 HISTORY OF VERTIGO: ICD-10-CM

## 2017-09-20 DIAGNOSIS — G60.9 PERIPHERAL NEUROPATHY, IDIOPATHIC: ICD-10-CM

## 2017-09-20 DIAGNOSIS — M54.12 CERVICAL RADICULOPATHY: ICD-10-CM

## 2017-09-20 PROCEDURE — 99213 OFFICE O/P EST LOW 20 MIN: CPT | Mod: 25 | Performed by: FAMILY MEDICINE

## 2017-09-20 PROCEDURE — 97814 ACUP 1/> W/ESTIM EA ADDL 15: CPT | Performed by: FAMILY MEDICINE

## 2017-09-20 PROCEDURE — 97813 ACUP 1/> W/ESTIM 1ST 15 MIN: CPT | Performed by: FAMILY MEDICINE

## 2017-09-20 PROCEDURE — 97811 ACUP 1/> W/O ESTIM EA ADD 15: CPT | Performed by: FAMILY MEDICINE

## 2017-09-20 NOTE — PROGRESS NOTES
[facility] Progress Note  [address] [place] [address]  Dept: [phone]      Patient Name: Primo Ca   MRN: [id number]  YOB: 1940  PCP: Jefferson Garcia M.D.  Date of Service: 9/20/2017  9:57 AM    CC Alvarado - SANDY  peripheral neuropathy, OA wrists R>L (lateral aspect), shoulder pain L - improved.  Recent dehydration requiring ER visit. Recent vertigo -improved. Cervicalgia.      HPI He got dehydrated in [place].  He was quite tired and weak.  Low energy.  Poor appetitie.   He states that pedialyte helped the most.  He did see the outpatient doc and the ER.  He took an antibiotic for 5 day.      He's going to [place] in the new future.  They sold his daughter's house in [place]. Alvarado may be moving to [place] in the intermediate future.     L shoulder is improved.     Neck, Wrists, feet are bothering him.  He did have vertigo, but it's improved at this point. He did the epley maneuver in [place].  It comes and goes.  He did have his ears checked.   He's not had a brain scan, but this is pending.            ROS walks an hour 4x per week-   Seen by chiropractic in the past - states that this didn't help.   Favorite color - Red - Dark.  Just likes it. Wears brown and light colored clothes.    Favorite Season - likes the color of the fall .  Too hot in the summer.  90s is too hot.    For hobby / fun - travel.  In the states and overseas.  Likes to meet people, but really likes the history. Genealogy.   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    Retired at 55.    Didn't like the management.    4 years with the [employer].    Used to work in [place], [place]  Born in [place], [place] ? Early in the 2 AM.  Unknown.   R handed   PMH Past Medical History:   Diagnosis Date   • Diabetes    • Dyslipidemia    • GERD (gastroesophageal reflux disease)    • Gout    • Hypertension    • Obesity    • Vertigo      Past Surgical History:   Procedure Laterality Date    • CHOLECYSTECTOMY        SH Social History     Social History   • Marital status:      Spouse name: Evelyn   • Number of children: N/A   • Years of education: N/A     Social History Main Topics   • Smoking status: Former Smoker     Years: 22.00     Types: Pipe, Cigars     Start date: 4/1/1975     Quit date: 8/2/1981   • Smokeless tobacco: Never Used   • Alcohol use No   • Drug use: No   • Sexual activity: No      Comment: , 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     • hydrochlorothiazide (HYDRODIURIL) 25 MG Tab TAKE 1 TABLET BY MOUTH EVERY DAY 90 Tab 3   • JANUMET  MG per tablet TAKE 1 TABLET TWICE DAILY  WITH MEALS 180 Tab 1   • lisinopril (PRINIVIL) 20 MG Tab TAKE 1 TABLET BY MOUTH TWICE DAILY 180 Tab 3   • diltiazem CD (CARTIA XT) 240 MG CAPSULE SR 24 HR Take 1 Cap by mouth every day. 90 Cap 3   • indomethacin (INDOCIN) 50 MG Cap Take 50 mg by mouth 3 times a day.     • fluticasone (FLONASE) 50 MCG/ACT nasal spray Spray 2 Sprays in nose every day. 16 g 3   • Cholecalciferol (VITAMIN D) 2000 UNITS Cap Take 2,000 Units by mouth every day.     • aspirin EC (ECOTRIN) 81 MG Tablet Delayed Response Take 81 mg by mouth every evening.     • atenolol (TENORMIN) 50 MG Tab TAKE 1 TABLET BY MOUTH TWICE DAILY 180 Tab 3   • Alpha-Lipoic Acid 600 MG CAPS Take 1 Cap by mouth every day.     • cyanocobalamin (VITAMIN B-12) 500 MCG TABS Take 500 mcg by mouth every day.     • docosahexanoic acid (FISH OIL) 1000 MG CAPS Take 1,000 mg by mouth every day.       No current facility-administered medications on file prior to visit.       ALLERGIES Allergies   Allergen Reactions   • Spironolactone      diarrhea       PE Pulses - not examined today  Tongue - not examined today     Assesment Eastern StagnationShao Yin Edin Saavedra [place]  BaZi Saavedra Water ([place]ngel), strength indeterminate (strong per preferences) need time of birth    Western Encounter Diagnoses   Name Primary?   • Peripheral neuropathy, idiopathic    • Primary osteoarthritis of both wrists    • Cervical radiculopathy    • History of dehydration    • History of vertigo                   Plan Treatment matrix = Alternating  Set 1: RLE Yin Neuropathy Tx ++  Set 2: LLE Saavedra Neuropathy Tx ++  Set 3: RUE SI7-->Si5  Set 4:LUE HT5-->HT7   Set 5: bilateral FMS  Set 6: Ba Kai bilaterally     >15 min spent face to face, not including procedure.  >50% of the face to face time was spent in counseling and coordination. Topics discussed included:    The necessity of further workup for his vertigo, including MRI and followup with ENT.  Discussed the etiology of dehydration and some of the rehydration supplements, including gatorade,   pedialyte, and salted coconut water    Total acupuncture treatment time = 45 minutes      Kal Huizar M.D.

## 2017-09-20 NOTE — PATIENT INSTRUCTIONS
The side effects of Acupuncture needle insertion include: minor bruising, bleeding, or pain at the site of needle insertion.  If more worrisome symptoms, such as continued bleeding, severe bruising, or continue pain or altered sensation persist, please contact [facility] office @ [phone]

## 2017-10-09 RX ORDER — ATENOLOL 50 MG/1
TABLET ORAL
Qty: 180 TAB | Refills: 3 | Status: SHIPPED | OUTPATIENT
Start: 2017-10-09 | End: 2017-10-09

## 2017-11-27 RX ORDER — SITAGLIPTIN AND METFORMIN HYDROCHLORIDE 1000; 50 MG/1; MG/1
TABLET, FILM COATED ORAL
Qty: 180 TAB | Refills: 1 | Status: SHIPPED | OUTPATIENT
Start: 2017-11-27 | End: 2018-05-23 | Stop reason: SDUPTHER

## 2017-11-27 NOTE — TELEPHONE ENCOUNTER
Was the patient seen in the last year in this department? Yes     Does patient have an active prescription for medications requested? No     Received Request Via: Pharmacy     Last seen: 09/13/2017

## 2017-11-29 ENCOUNTER — OFFICE VISIT (OUTPATIENT)
Dept: OTHER | Facility: IMAGING CENTER | Age: 77
End: 2017-11-29
Payer: MEDICARE

## 2017-11-29 DIAGNOSIS — G60.9 PERIPHERAL NEUROPATHY, IDIOPATHIC: Primary | ICD-10-CM

## 2017-11-29 DIAGNOSIS — M19.031 PRIMARY OSTEOARTHRITIS OF BOTH WRISTS: ICD-10-CM

## 2017-11-29 DIAGNOSIS — M54.12 CERVICAL RADICULOPATHY: ICD-10-CM

## 2017-11-29 DIAGNOSIS — M19.032 PRIMARY OSTEOARTHRITIS OF BOTH WRISTS: ICD-10-CM

## 2017-11-29 PROCEDURE — 97814 ACUP 1/> W/ESTIM EA ADDL 15: CPT | Performed by: FAMILY MEDICINE

## 2017-11-29 PROCEDURE — 97811 ACUP 1/> W/O ESTIM EA ADD 15: CPT | Performed by: FAMILY MEDICINE

## 2017-11-29 PROCEDURE — 99213 OFFICE O/P EST LOW 20 MIN: CPT | Mod: 25 | Performed by: FAMILY MEDICINE

## 2017-11-29 PROCEDURE — 97813 ACUP 1/> W/ESTIM 1ST 15 MIN: CPT | Performed by: FAMILY MEDICINE

## 2017-11-29 NOTE — PROGRESS NOTES
[facility] Progress Note  [address] [place] [address]  Dept: [phone]      Patient Name: Primo Ca   MRN: [id number]  YOB: 1940  PCP: Jefferson Garcia M.D.  Date of Service: 11/29/2017  3:31 PM    CC Alvarado - SANDY  peripheral neuropathy, OA wrists R>L (lateral aspect), shoulder pain L      HPI He and evelyn just got back from [place].  He and she did quite a bit of walking.  He did lose 5 # and 3 notches on his belt.  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        L shoulder and neck is tight.  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      Stable: Neuropathy to feet - not getting worse.      He had to switch HTN medications due to a shortage of Atenolol.                 ROS walks an hour 4x per week-   Seen by chiropractic in the past - states that this didn't help.   Favorite color - Red - Dark.  Just likes it. Wears brown and light colored clothes.    Favorite Season - likes the color of the fall .  Too hot in the summer.  90s is too hot.    For hobby / fun - travel.  In the states and overseas.  Likes to meet people, but really likes the history. Genealogy.   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    Retired at 55.    Didn't like the management.    4 years with the [employer].    Used to work in [place], [place]  Born in [place], [place] ? Early in the 2 AM.  Unknown.   R handed   PMH Past Medical History:   asdfasdfads Date   • Diabetes    • Dyslipidemia    • GERD (gastroesophageal reflux disease)    • Gout    • Hypertension    • Obesity    • Vertigo      Past Surgical History:   Procedure Laterality Date   • CHOLECYSTECTOMY        SH Social History     Social History   • Marital status:      Spouse name: Evelyn   • Number of children: N/A   • Years of education: N/A     Social History Main Topics   • Smoking status: Former Smoker     Years: 22.00     Types: Pipe, Cigars     Start date: 4/1/1975     Quit  date: 8/2/1981   • Smokeless tobacco: Never Used   • Alcohol use No   • Drug use: No   • Sexual activity: No      Comment: , 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 180 Tab 3   • lovastatin (MEVACOR) 20 MG Tab TAKE 3 TABLETS BY MOUTH EVERY  Tab 3   • POTASSIUM PO Take  by mouth.     • hydrochlorothiazide (HYDRODIURIL) 25 MG Tab TAKE 1 TABLET BY MOUTH EVERY DAY 90 Tab 3   • lisinopril (PRINIVIL) 20 MG Tab TAKE 1 TABLET BY MOUTH TWICE DAILY 180 Tab 3   • diltiazem CD (CARTIA XT) 240 MG CAPSULE SR 24 HR Take 1 Cap by mouth every day. 90 Cap 3   • indomethacin (INDOCIN) 50 MG Cap Take 50 mg by mouth 3 times a day.     • fluticasone (FLONASE) 50 MCG/ACT nasal spray Spray 2 Sprays in nose every day. 16 g 3   • Cholecalciferol (VITAMIN D) 2000 UNITS Cap Take 2,000 Units by mouth every day.     • aspirin EC (ECOTRIN) 81 MG Tablet Delayed Response Take 81 mg by mouth every evening.     • Alpha-Lipoic Acid 600 MG CAPS Take 1 Cap by mouth every day.     • cyanocobalamin (VITAMIN B-12) 500 MCG TABS Take 500 mcg by mouth every day.     • docosahexanoic acid (FISH OIL) 1000 MG CAPS Take 1,000 mg by mouth every day.       No current facility-administered medications on file prior to visit.       ALLERGIES Allergies   Allergen Reactions   • Spironolactone      diarrhea      PE Pulses - not examined today  Tongue - not examined today     Assesment Eastern StagnationShao Yin Edin Saavedra [place]  BaZi Saavedra Water ([place]), strength indeterminate (strong per preferences) need time of birth    Western Encounter Diagnoses   Name Primary?   • Peripheral neuropathy, idiopathic Yes   • Primary  osteoarthritis of both wrists    • Cervical radiculopathy                   Plan Treatment matrix = Alternating  Set 1: RLE Yin Neuropathy Tx ++  Set 2: LLE Saavedra Neuropathy Tx ++  Set 3: RUE SI7-->Si5  Set 4:LUE HT5-->HT7 / LU7-->LU2   Set 5: bilateral FMS  Set 6: Ba Kai bilaterally     >15 min spent face to face, not including procedure.  >50% of the face to face time was spent in counseling and coordination. Topics discussed included:    The benefits of continued exercise and how quickly exercise can improve health.   The benefits of continuing exercise on his health and peripheral neuropathy.  Discussed the benefits of plant based whole food diet on peripheral neuropathy.     Total acupuncture treatment time = 45 minutes      Kal Huizar M.D.

## 2017-11-29 NOTE — PATIENT INSTRUCTIONS
The side effects of Acupuncture needle insertion include: minor bruising, bleeding, or pain at the site of needle insertion.  If more worrisome symptoms, such as continued bleeding, severe bruising, or continue pain or altered sensation persist, please contact [facility] office @ [phone]

## 2017-12-01 ENCOUNTER — PATIENT MESSAGE (OUTPATIENT)
Dept: MEDICAL GROUP | Facility: MEDICAL CENTER | Age: 77
End: 2017-12-01

## 2017-12-03 RX ORDER — ATENOLOL 50 MG/1
50 TABLET ORAL 2 TIMES DAILY
Qty: 180 TAB | Refills: 3 | Status: SHIPPED | OUTPATIENT
Start: 2017-12-03 | End: 2018-03-14 | Stop reason: SDUPTHER

## 2017-12-03 NOTE — TELEPHONE ENCOUNTER
From: Primo Ca  To: Jefferson Garcia M.D.  Sent: 12/1/2017 5:17 PM PST  Subject: Non-Urgent Medical Question    Dr Bloom. Back from [place] and started med to replace Atenelol for 5 days now and NOT working. Called pharmacy and they now have Atenelol in stock and are filling the prescription and I will  tonight, and start in the morning. Atenelol was working very very well until we changed due to shortage of it just before we left on trip. I will be calling next week to set up next appt with labs you gave me. Please advise when I should visit. When I saw you 2x in SEPT I thought I had dropped 20th NOV off until after I returned. Pls advise on Atenelol.

## 2017-12-04 ENCOUNTER — PATIENT MESSAGE (OUTPATIENT)
Dept: MEDICAL GROUP | Facility: MEDICAL CENTER | Age: 77
End: 2017-12-04

## 2017-12-04 DIAGNOSIS — G47.33 OSA ON CPAP: ICD-10-CM

## 2017-12-04 NOTE — TELEPHONE ENCOUNTER
From: Primo Ca  To: Jefferson Garcia M.D.  Sent: 12/4/2017 10:09 AM PST  Subject: Non-Urgent Medical Question    Dr Guillermo at [facility] closed shop in November, and advised me by letter to seek a new doctor. Can you refer me to [facility] to set appointment in June. CPAP is doing its job for over 2 years now. Thanks. Also, I am setting up appointment to see you in late January. BP and pulse are coming down/back to normal with Atenelol.

## 2017-12-08 ENCOUNTER — TELEPHONE (OUTPATIENT)
Dept: MEDICAL GROUP | Facility: MEDICAL CENTER | Age: 77
End: 2017-12-08

## 2017-12-08 DIAGNOSIS — E11.40 TYPE 2 DIABETES, CONTROLLED, WITH NEUROPATHY (HCC): ICD-10-CM

## 2017-12-09 NOTE — TELEPHONE ENCOUNTER
1. Caller Name: Pt                                          Call Back Number: [phone] (home)         Patient approves a detailed voicemail message: N\A    2. What supplies does the patient need? Test strips    3. What brand of meter does the patient use? One touch ULTRA 2    4. How many times a day is the patient testing? 1-2 times daily    Dx: E11.65, is not using insulin. Last A1c =   Lab Results   Component Value Date/Time    HBA1C 7.4 (H) 07/05/2017 07:31 AM    HBA1C 7.0 (H) 03/14/2017 07:59 AM   .

## 2017-12-11 ENCOUNTER — HOSPITAL ENCOUNTER (EMERGENCY)
Facility: MEDICAL CENTER | Age: 77
End: 2017-12-11
Attending: EMERGENCY MEDICINE
Payer: MEDICARE

## 2017-12-11 VITALS
OXYGEN SATURATION: 91 % | DIASTOLIC BLOOD PRESSURE: 108 MMHG | WEIGHT: 229.72 LBS | TEMPERATURE: 97.2 F | HEIGHT: 70 IN | HEART RATE: 81 BPM | BODY MASS INDEX: 32.89 KG/M2 | RESPIRATION RATE: 11 BRPM | SYSTOLIC BLOOD PRESSURE: 192 MMHG

## 2017-12-11 DIAGNOSIS — E86.0 DEHYDRATION: ICD-10-CM

## 2017-12-11 DIAGNOSIS — R03.0 TRANSIENT ELEVATED BLOOD PRESSURE: ICD-10-CM

## 2017-12-11 LAB
ANION GAP SERPL CALC-SCNC: 11 MMOL/L (ref 0–11.9)
BASOPHILS # BLD AUTO: 0.4 % (ref 0–1.8)
BASOPHILS # BLD: 0.04 K/UL (ref 0–0.12)
BUN SERPL-MCNC: 15 MG/DL (ref 8–22)
CALCIUM SERPL-MCNC: 9.2 MG/DL (ref 8.4–10.2)
CHLORIDE SERPL-SCNC: 97 MMOL/L (ref 96–112)
CO2 SERPL-SCNC: 26 MMOL/L (ref 20–33)
CREAT SERPL-MCNC: 1.1 MG/DL (ref 0.5–1.4)
EOSINOPHIL # BLD AUTO: 0.14 K/UL (ref 0–0.51)
EOSINOPHIL NFR BLD: 1.3 % (ref 0–6.9)
ERYTHROCYTE [DISTWIDTH] IN BLOOD BY AUTOMATED COUNT: 42.8 FL (ref 35.9–50)
GFR SERPL CREATININE-BSD FRML MDRD: >60 ML/MIN/1.73 M 2
GLUCOSE SERPL-MCNC: 141 MG/DL (ref 65–99)
HCT VFR BLD AUTO: 44.1 % (ref 42–52)
HGB BLD-MCNC: 15.6 G/DL (ref 14–18)
IMM GRANULOCYTES # BLD AUTO: 0.03 K/UL (ref 0–0.11)
IMM GRANULOCYTES NFR BLD AUTO: 0.3 % (ref 0–0.9)
LYMPHOCYTES # BLD AUTO: 2.16 K/UL (ref 1–4.8)
LYMPHOCYTES NFR BLD: 20.8 % (ref 22–41)
MCH RBC QN AUTO: 30.5 PG (ref 27–33)
MCHC RBC AUTO-ENTMCNC: 35.4 G/DL (ref 33.7–35.3)
MCV RBC AUTO: 86.1 FL (ref 81.4–97.8)
MONOCYTES # BLD AUTO: 0.74 K/UL (ref 0–0.85)
MONOCYTES NFR BLD AUTO: 7.1 % (ref 0–13.4)
NEUTROPHILS # BLD AUTO: 7.27 K/UL (ref 1.82–7.42)
NEUTROPHILS NFR BLD: 70.1 % (ref 44–72)
NRBC # BLD AUTO: 0 K/UL
NRBC BLD AUTO-RTO: 0 /100 WBC
PLATELET # BLD AUTO: 315 K/UL (ref 164–446)
PMV BLD AUTO: 8.6 FL (ref 9–12.9)
POTASSIUM SERPL-SCNC: 3.5 MMOL/L (ref 3.6–5.5)
RBC # BLD AUTO: 5.12 M/UL (ref 4.7–6.1)
SODIUM SERPL-SCNC: 134 MMOL/L (ref 135–145)
WBC # BLD AUTO: 10.4 K/UL (ref 4.8–10.8)

## 2017-12-11 PROCEDURE — 85025 COMPLETE CBC W/AUTO DIFF WBC: CPT

## 2017-12-11 PROCEDURE — 700105 HCHG RX REV CODE 258: Performed by: EMERGENCY MEDICINE

## 2017-12-11 PROCEDURE — 99284 EMERGENCY DEPT VISIT MOD MDM: CPT

## 2017-12-11 PROCEDURE — 96361 HYDRATE IV INFUSION ADD-ON: CPT

## 2017-12-11 PROCEDURE — 96374 THER/PROPH/DIAG INJ IV PUSH: CPT

## 2017-12-11 PROCEDURE — 36415 COLL VENOUS BLD VENIPUNCTURE: CPT

## 2017-12-11 PROCEDURE — 80048 BASIC METABOLIC PNL TOTAL CA: CPT

## 2017-12-11 PROCEDURE — 700101 HCHG RX REV CODE 250: Performed by: EMERGENCY MEDICINE

## 2017-12-11 RX ORDER — LABETALOL HYDROCHLORIDE 5 MG/ML
10 INJECTION, SOLUTION INTRAVENOUS ONCE
Status: COMPLETED | OUTPATIENT
Start: 2017-12-11 | End: 2017-12-11

## 2017-12-11 RX ORDER — SODIUM CHLORIDE 9 MG/ML
1000 INJECTION, SOLUTION INTRAVENOUS ONCE
Status: COMPLETED | OUTPATIENT
Start: 2017-12-11 | End: 2017-12-11

## 2017-12-11 RX ADMIN — LABETALOL HYDROCHLORIDE 10 MG: 5 INJECTION, SOLUTION INTRAVENOUS at 21:09

## 2017-12-11 RX ADMIN — SODIUM CHLORIDE 1000 ML: 9 INJECTION, SOLUTION INTRAVENOUS at 21:09

## 2017-12-11 ASSESSMENT — PAIN SCALES - GENERAL: PAINLEVEL_OUTOF10: 0

## 2017-12-12 ENCOUNTER — PATIENT OUTREACH (OUTPATIENT)
Dept: HEALTH INFORMATION MANAGEMENT | Facility: OTHER | Age: 77
End: 2017-12-12

## 2017-12-12 NOTE — DISCHARGE INSTRUCTIONS
Dehydration, Adult  Dehydration is when you lose more fluids from the body than you take in. Vital organs like the kidneys, brain, and heart cannot function without a proper amount of fluids and salt. Any loss of fluids from the body can cause dehydration.   CAUSES   · Vomiting.  · Diarrhea.  · Excessive sweating.  · Excessive urine output.  · Fever.  SYMPTOMS   Mild dehydration  · Thirst.  · Dry lips.  · Slightly dry mouth.  Moderate dehydration  · Very dry mouth.  · Sunken eyes.  · Skin does not bounce back quickly when lightly pinched and released.  · Dark urine and decreased urine production.  · Decreased tear production.  · Headache.  Severe dehydration  · Very dry mouth.  · Extreme thirst.  · Rapid, weak pulse (more than 100 beats per minute at rest).  · Cold hands and feet.  · Not able to sweat in spite of heat and temperature.  · Rapid breathing.  · Blue lips.  · Confusion and lethargy.  · Difficulty being awakened.  · Minimal urine production.  · No tears.  DIAGNOSIS   Your caregiver will diagnose dehydration based on your symptoms and your exam. Blood and urine tests will help confirm the diagnosis. The diagnostic evaluation should also identify the cause of dehydration.  TREATMENT   Treatment of mild or moderate dehydration can often be done at home by increasing the amount of fluids that you drink. It is best to drink small amounts of fluid more often. Drinking too much at one time can make vomiting worse. Refer to the home care instructions below.  Severe dehydration needs to be treated at the hospital where you will probably be given intravenous (IV) fluids that contain water and electrolytes.  HOME CARE INSTRUCTIONS   · Ask your caregiver about specific rehydration instructions.  · Drink enough fluids to keep your urine clear or pale yellow.  · Drink small amounts frequently if you have nausea and vomiting.  · Eat as you normally do.  · Avoid:  ¨ Foods or drinks high in sugar.  ¨ Carbonated  drinks.  ¨ Juice.  ¨ Extremely hot or cold fluids.  ¨ Drinks with caffeine.  ¨ Fatty, greasy foods.  ¨ Alcohol.  ¨ Tobacco.  ¨ Overeating.  ¨ Gelatin desserts.  · Wash your hands well to avoid spreading bacteria and viruses.  · Only take over-the-counter or prescription medicines for pain, discomfort, or fever as directed by your caregiver.  · Ask your caregiver if you should continue all prescribed and over-the-counter medicines.  · Keep all follow-up appointments with your caregiver.  SEEK MEDICAL CARE IF:  · You have abdominal pain and it increases or stays in one area (localizes).  · You have a rash, stiff neck, or severe headache.  · You are irritable, sleepy, or difficult to awaken.  · You are weak, dizzy, or extremely thirsty.  SEEK IMMEDIATE MEDICAL CARE IF:   · You are unable to keep fluids down or you get worse despite treatment.  · You have frequent episodes of vomiting or diarrhea.  · You have blood or green matter (bile) in your vomit.  · You have blood in your stool or your stool looks black and tarry.  · You have not urinated in 6 to 8 hours, or you have only urinated a small amount of very dark urine.  · You have a fever.  · You faint.  MAKE SURE YOU:   · Understand these instructions.  · Will watch your condition.  · Will get help right away if you are not doing well or get worse.     This information is not intended to replace advice given to you by your health care provider. Make sure you discuss any questions you have with your health care provider.     Document Released: 12/18/2006 Document Revised: 03/11/2013 Document Reviewed: 08/06/2012  [employer] Interactive Patient Education ©2016 [employer]    Hypertension  Hypertension, commonly called high blood pressure, is when the force of blood pumping through your arteries is too strong. Your arteries are the blood vessels that carry blood from your heart throughout your body. A blood pressure reading consists of a higher number over a lower  number, such as 110/72. The higher number (systolic) is the pressure inside your arteries when your heart pumps. The lower number (diastolic) is the pressure inside your arteries when your heart relaxes. Ideally you want your blood pressure below 120/80.  Hypertension forces your heart to work harder to pump blood. Your arteries may become narrow or stiff. Having untreated or uncontrolled hypertension can cause heart attack, stroke, kidney disease, and other problems.  RISK FACTORS  Some risk factors for high blood pressure are controllable. Others are not.   Risk factors you cannot control include:   · Race. You may be at higher risk if you are .  · Age. Risk increases with age.  · Gender. Men are at higher risk than women before age 45 years. After age 65, women are at higher risk than men.  Risk factors you can control include:  · Not getting enough exercise or physical activity.  · Being overweight.  · Getting too much fat, sugar, calories, or salt in your diet.  · Drinking too much alcohol.  SIGNS AND SYMPTOMS  Hypertension does not usually cause signs or symptoms. Extremely high blood pressure (hypertensive crisis) may cause headache, anxiety, shortness of breath, and nosebleed.  DIAGNOSIS  To check if you have hypertension, your health care provider will measure your blood pressure while you are seated, with your arm held at the level of your heart. It should be measured at least twice using the same arm. Certain conditions can cause a difference in blood pressure between your right and left arms. A blood pressure reading that is higher than normal on one occasion does not mean that you need treatment. If it is not clear whether you have high blood pressure, you may be asked to return on a different day to have your blood pressure checked again. Or, you may be asked to monitor your blood pressure at home for 1 or more weeks.  TREATMENT  Treating high blood pressure includes making lifestyle  changes and possibly taking medicine. Living a healthy lifestyle can help lower high blood pressure. You may need to change some of your habits.  Lifestyle changes may include:  · Following the DASH diet. This diet is high in fruits, vegetables, and whole grains. It is low in salt, red meat, and added sugars.  · Keep your sodium intake below 2,300 mg per day.  · Getting at least 30-45 minutes of aerobic exercise at least 4 times per week.  · Losing weight if necessary.  · Not smoking.  · Limiting alcoholic beverages.  · Learning ways to reduce stress.  Your health care provider may prescribe medicine if lifestyle changes are not enough to get your blood pressure under control, and if one of the following is true:  · You are 18-59 years of age and your systolic blood pressure is above 140.  · You are 60 years of age or older, and your systolic blood pressure is above 150.  · Your diastolic blood pressure is above 90.  · You have diabetes, and your systolic blood pressure is over 140 or your diastolic blood pressure is over 90.  · You have kidney disease and your blood pressure is above 140/90.  · You have heart disease and your blood pressure is above 140/90.  Your personal target blood pressure may vary depending on your medical conditions, your age, and other factors.  HOME CARE INSTRUCTIONS  · Have your blood pressure rechecked as directed by your health care provider.    · Take medicines only as directed by your health care provider. Follow the directions carefully. Blood pressure medicines must be taken as prescribed. The medicine does not work as well when you skip doses. Skipping doses also puts you at risk for problems.  · Do not smoke.    · Monitor your blood pressure at home as directed by your health care provider.   SEEK MEDICAL CARE IF:   · You think you are having a reaction to medicines taken.  · You have recurrent headaches or feel dizzy.  · You have swelling in your ankles.  · You have trouble with  your vision.  SEEK IMMEDIATE MEDICAL CARE IF:  · You develop a severe headache or confusion.  · You have unusual weakness, numbness, or feel faint.  · You have severe chest or abdominal pain.  · You vomit repeatedly.  · You have trouble breathing.  MAKE SURE YOU:   · Understand these instructions.  · Will watch your condition.  · Will get help right away if you are not doing well or get worse.     This information is not intended to replace advice given to you by your health care provider. Make sure you discuss any questions you have with your health care provider.     Document Released: 12/18/2006 Document Revised: 05/03/2016 Document Reviewed: 10/10/2014  [employer] Interactive Patient Education ©2016 [employer]

## 2017-12-12 NOTE — ED PROVIDER NOTES
ED Provider Note    CHIEF COMPLAINT  Chief Complaint   Patient presents with   • Other     Pt reports that he has been flushed and dehydrated since this afternoon   • Blood Pressure Problem     Reports high blood pressure at home. Denies CP and SOB. Pt also denies headache and visual changes. Took home BP medication tonight of Lisinopril and Atenolol.        HPI  Primo Ca is a 77 y.o. male who presents to the emergency departmentWith complaint of elevated blood pressure 220 systolic as well as dehydration. He recently returned from a trip in [place] and states that since he returned he's been parched, and does have a history of dehydration and feels dehydrated. Denies chest pain, headache, short of breath, fever, nausea, vomiting, swelling of his lower extremities. She does state that he took his atenolol and lisinopril approximately 3 PM.    REVIEW OF SYSTEMS  Positives as above. Pertinent negatives include fever, chest pain, as well as lower extremities, neck pain, back pain, lightheadedness, dizziness, nausea, vomiting  All other review of systems are negative    PAST MEDICAL HISTORY  Past Medical History:   Diagnosis Date   • Diabetes    • Dyslipidemia    • GERD (gastroesophageal reflux disease)    • Gout    • Hypertension    • Obesity    • Vertigo        FAMILY HISTORY  Noncontributory    SOCIAL HISTORY  Social History     Social History   • Marital status:      Spouse name: Evelyn   • Number of children: N/A   • Years of education: N/A     Social History Main Topics   • Smoking status: Former Smoker     Years: 22.00     Types: Pipe, Cigars     Start date: 4/1/1975     Quit date: 8/2/1981   • Smokeless tobacco: Never Used   • Alcohol use No   • Drug use: No   • Sexual activity: No      Comment: , 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, retired government official     Other Topics Concern   • Not on file     Social History Narrative   • No narrative on file       SURGICAL HISTORY  Past Surgical History:  "  Procedure Laterality Date   • CHOLECYSTECTOMY         CURRENT MEDICATIONS  Home Medications    **Home medications have not yet been reviewed for this encounter**         ALLERGIES  Allergies   Allergen Reactions   • Spironolactone      diarrhea       PHYSICAL EXAM  VITAL SIGNS: BP (!) 192/108   Pulse 81   Temp 36.2 °C (97.2 °F)   Resp (!) 11   Ht 1.778 m (5' 10\")   Wt 104.2 kg (229 lb 11.5 oz)   SpO2 91%   BMI 32.96 kg/m²      Constitutional: Well developed, Well nourished, No acute distress, Non-toxic appearance.   Eyes: PERRLA, EOMI, Conjunctiva normal, No discharge.   Cardiovascular: Normal heart rate, Normal rhythm, No murmurs, No rubs, No gallops, and intact distal pulses.   Thorax & Lungs:  No respiratory distress, no rales, no rhonchi, No wheezing, No chest wall tenderness.   Abdomen: Bowel sounds normal, Soft, No tenderness, No guarding, No rebound, No pulsatile masses.   Skin: Warm, Dry, No erythema, No rash.   Extremities: Full range of motion, no deformity, no pedal edema.  Neurologic: Alert & oriented x 3, No focal deficits noted, acting appropriately on exam.  Psychiatric: Affect normal for clinical presentation.    Results for orders placed or performed during the hospital encounter of 12/11/17   CBC WITH DIFFERENTIAL   Result Value Ref Range    WBC 10.4 4.8 - 10.8 K/uL    RBC 5.12 4.70 - 6.10 M/uL    Hemoglobin 15.6 14.0 - 18.0 g/dL    Hematocrit 44.1 42.0 - 52.0 %    MCV 86.1 81.4 - 97.8 fL    MCH 30.5 27.0 - 33.0 pg    MCHC 35.4 (H) 33.7 - 35.3 g/dL    RDW 42.8 35.9 - 50.0 fL    Platelet Count 315 164 - 446 K/uL    MPV 8.6 (L) 9.0 - 12.9 fL    Neutrophils-Polys 70.10 44.00 - 72.00 %    Lymphocytes 20.80 (L) 22.00 - 41.00 %    Monocytes 7.10 0.00 - 13.40 %    Eosinophils 1.30 0.00 - 6.90 %    Basophils 0.40 0.00 - 1.80 %    Immature Granulocytes 0.30 0.00 - 0.90 %    Nucleated RBC 0.00 /100 WBC    Neutrophils (Absolute) 7.27 1.82 - 7.42 K/uL    Lymphs (Absolute) 2.16 1.00 - 4.80 K/uL    " Monos (Absolute) 0.74 0.00 - 0.85 K/uL    Eos (Absolute) 0.14 0.00 - 0.51 K/uL    Baso (Absolute) 0.04 0.00 - 0.12 K/uL    Immature Granulocytes (abs) 0.03 0.00 - 0.11 K/uL    NRBC (Absolute) 0.00 K/uL   BASIC METABOLIC PANEL   Result Value Ref Range    Sodium 134 (L) 135 - 145 mmol/L    Potassium 3.5 (L) 3.6 - 5.5 mmol/L    Chloride 97 96 - 112 mmol/L    Co2 26 20 - 33 mmol/L    Glucose 141 (H) 65 - 99 mg/dL    Bun 15 8 - 22 mg/dL    Creatinine 1.10 0.50 - 1.40 mg/dL    Calcium 9.2 8.4 - 10.2 mg/dL    Anion Gap 11.0 0.0 - 11.9   ESTIMATED GFR   Result Value Ref Range    GFR If African American >60 >60 mL/min/1.73 m 2    GFR If Non African American >60 >60 mL/min/1.73 m 2         COURSE & MEDICAL DECISION MAKING  Pertinent Labs & Imaging studies reviewed. (See chart for details)  This is a [facility] 77-year-old male presents with dehydration symptoms as well as L a blood pressure. He received labetalol 10 mg IV with significant reduction of his blood pressure. He has no evidence of end organ damage. He received 1 L normal saline IV fluid secondary to his hyponatremia and states that he feels much better. Reevaluation at 2200, the patient has a normal blood pressure, normal heart rate, he has no evidence in organ damage, he states he feels much better. Strict return precautions have been given.      FINAL IMPRESSION     1. Dehydration    2. Transient elevated blood pressure      The patient will return for new or worsening symptoms and is stable at the time of discharge.    The patient is referred to a primary physician for blood pressure management, diabetic screening, and for all other preventative health concerns.    DISPOSITION:  Patient will be discharged home in stable condition.    FOLLOW UP:  [facility], Emergency Dept  [address]  [place] [id number] [phone]    If symptoms worsen    Jefferson Garcia M.D.  [address]  [id number]  [place]  [id number]  [phone]    Schedule an appointment as soon as possible for a visit  As needed          Electronically signed by: Patrick Ramirez, 12/11/2017 8:42 PM

## 2017-12-12 NOTE — ED NOTES
"Chief Complaint   Patient presents with   • Other     Pt reports that he has been flushed and dehydrated since this afternoon   • Blood Pressure Problem     Reports high blood pressure at home. Denies CP and SOB. Pt also denies headache and visual changes. Took home BP medication tonight of Lisinopril and Atenolol.      BP (!) 192/108   Pulse 82   Temp 36.2 °C (97.2 °F)   Resp 18   Ht 1.778 m (5' 10\")   Wt 104.2 kg (229 lb 11.5 oz)   SpO2 97%   BMI 32.96 kg/m²     Hx DM 2, sugar at 1630 was 178  "

## 2018-01-08 ENCOUNTER — HOSPITAL ENCOUNTER (OUTPATIENT)
Dept: LAB | Facility: MEDICAL CENTER | Age: 78
End: 2018-01-08
Attending: FAMILY MEDICINE
Payer: MEDICARE

## 2018-01-08 DIAGNOSIS — E11.40 TYPE 2 DIABETES, CONTROLLED, WITH NEUROPATHY (HCC): ICD-10-CM

## 2018-01-08 DIAGNOSIS — M1A.0790 CHRONIC IDIOPATHIC GOUT INVOLVING TOE WITHOUT TOPHUS, UNSPECIFIED LATERALITY: ICD-10-CM

## 2018-01-08 LAB
CREAT UR-MCNC: 103.9 MG/DL
EST. AVERAGE GLUCOSE BLD GHB EST-MCNC: 151 MG/DL
HBA1C MFR BLD: 6.9 % (ref 0–5.6)
MICROALBUMIN UR-MCNC: 30.9 MG/DL
MICROALBUMIN/CREAT UR: 297 MG/G (ref 0–30)

## 2018-01-08 PROCEDURE — 82570 ASSAY OF URINE CREATININE: CPT

## 2018-01-08 PROCEDURE — 80053 COMPREHEN METABOLIC PANEL: CPT

## 2018-01-08 PROCEDURE — 83036 HEMOGLOBIN GLYCOSYLATED A1C: CPT | Mod: GA

## 2018-01-08 PROCEDURE — 36415 COLL VENOUS BLD VENIPUNCTURE: CPT

## 2018-01-08 PROCEDURE — 84550 ASSAY OF BLOOD/URIC ACID: CPT

## 2018-01-08 PROCEDURE — 80061 LIPID PANEL: CPT

## 2018-01-08 PROCEDURE — 82043 UR ALBUMIN QUANTITATIVE: CPT

## 2018-01-08 ASSESSMENT — ENCOUNTER SYMPTOMS: SLEEP DISTURBANCE: 0

## 2018-01-09 LAB
ALBUMIN SERPL BCP-MCNC: 4.3 G/DL (ref 3.2–4.9)
ALBUMIN/GLOB SERPL: 1.3 G/DL
ALP SERPL-CCNC: 62 U/L (ref 30–99)
ALT SERPL-CCNC: 9 U/L (ref 2–50)
ANION GAP SERPL CALC-SCNC: 6 MMOL/L (ref 0–11.9)
AST SERPL-CCNC: 10 U/L (ref 12–45)
BILIRUB SERPL-MCNC: 0.5 MG/DL (ref 0.1–1.5)
BUN SERPL-MCNC: 14 MG/DL (ref 8–22)
CALCIUM SERPL-MCNC: 9.8 MG/DL (ref 8.5–10.5)
CHLORIDE SERPL-SCNC: 100 MMOL/L (ref 96–112)
CHOLEST SERPL-MCNC: 156 MG/DL (ref 100–199)
CO2 SERPL-SCNC: 33 MMOL/L (ref 20–33)
CREAT SERPL-MCNC: 1.02 MG/DL (ref 0.5–1.4)
GLOBULIN SER CALC-MCNC: 3.3 G/DL (ref 1.9–3.5)
GLUCOSE SERPL-MCNC: 150 MG/DL (ref 65–99)
HDLC SERPL-MCNC: 50 MG/DL
LDLC SERPL CALC-MCNC: 72 MG/DL
POTASSIUM SERPL-SCNC: 4.3 MMOL/L (ref 3.6–5.5)
PROT SERPL-MCNC: 7.6 G/DL (ref 6–8.2)
SODIUM SERPL-SCNC: 139 MMOL/L (ref 135–145)
TRIGL SERPL-MCNC: 171 MG/DL (ref 0–149)
URATE SERPL-MCNC: 8.1 MG/DL (ref 2.5–8.3)

## 2018-01-12 ENCOUNTER — SLEEP CENTER VISIT (OUTPATIENT)
Dept: SLEEP MEDICINE | Facility: MEDICAL CENTER | Age: 78
End: 2018-01-12
Payer: MEDICARE

## 2018-01-12 VITALS
BODY MASS INDEX: 32.93 KG/M2 | HEART RATE: 52 BPM | RESPIRATION RATE: 16 BRPM | HEIGHT: 70 IN | SYSTOLIC BLOOD PRESSURE: 140 MMHG | DIASTOLIC BLOOD PRESSURE: 86 MMHG | OXYGEN SATURATION: 97 % | WEIGHT: 230 LBS

## 2018-01-12 DIAGNOSIS — E66.9 OBESITY (BMI 30-39.9): ICD-10-CM

## 2018-01-12 DIAGNOSIS — I10 ESSENTIAL HYPERTENSION: ICD-10-CM

## 2018-01-12 DIAGNOSIS — G47.33 OSA ON CPAP: ICD-10-CM

## 2018-01-12 DIAGNOSIS — E11.40 TYPE 2 DIABETES, CONTROLLED, WITH NEUROPATHY (HCC): ICD-10-CM

## 2018-01-12 PROCEDURE — 99203 OFFICE O/P NEW LOW 30 MIN: CPT | Performed by: FAMILY MEDICINE

## 2018-01-12 NOTE — PATIENT INSTRUCTIONS
Calorie Counting for Weight Loss  Calories are energy you get from the things you eat and drink. Your body uses this energy to keep you going throughout the day. The number of calories you eat affects your weight. When you eat more calories than your body needs, your body stores the extra calories as fat. When you eat fewer calories than your body needs, your body burns fat to get the energy it needs.  Calorie counting means keeping track of how many calories you eat and drink each day. If you make sure to eat fewer calories than your body needs, you should lose weight. In order for calorie counting to work, you will need to eat the number of calories that are right for you in a day to lose a healthy amount of weight per week. A healthy amount of weight to lose per week is usually 1-2 lb (0.5-0.9 kg). A dietitian can determine how many calories you need in a day and give you suggestions on how to reach your calorie goal.   WHAT IS MY MY PLAN?  My goal is to have __________ calories per day.   If I have this many calories per day, I should lose around __________ pounds per week.  WHAT DO I NEED TO KNOW ABOUT CALORIE COUNTING?  In order to meet your daily calorie goal, you will need to:  · Find out how many calories are in each food you would like to eat. Try to do this before you eat.  · Decide how much of the food you can eat.  · Write down what you ate and how many calories it had. Doing this is called keeping a food log.  WHERE DO I FIND CALORIE INFORMATION?  The number of calories in a food can be found on a Nutrition Facts label. Note that all the information on a label is based on a specific serving of the food. If a food does not have a Nutrition Facts label, try to look up the calories online or ask your dietitian for help.  HOW DO I DECIDE HOW MUCH TO EAT?  To decide how much of the food you can eat, you will need to consider both the number of calories in one serving and the size of one serving. This  information can be found on the Nutrition Facts label. If a food does not have a Nutrition Facts label, look up the information online or ask your dietitian for help.  Remember that calories are listed per serving. If you choose to have more than one serving of a food, you will have to multiply the calories per serving by the amount of servings you plan to eat. For example, the label on a package of bread might say that a serving size is 1 slice and that there are 90 calories in a serving. If you eat 1 slice, you will have eaten 90 calories. If you eat 2 slices, you will have eaten 180 calories.  HOW DO I KEEP A FOOD LOG?  After each meal, record the following information in your food log:  · What you ate.  · How much of it you ate.  · How many calories it had.  · Then, add up your calories.  Keep your food log near you, such as in a small notebook in your pocket. Another option is to use a mobile tono or website. Some programs will calculate calories for you and show you how many calories you have left each time you add an item to the log.  WHAT ARE SOME CALORIE COUNTING TIPS?  · Use your calories on foods and drinks that will fill you up and not leave you hungry. Some examples of this include foods like nuts and nut butters, vegetables, lean proteins, and high-fiber foods (more than 5 g fiber per serving).  · Eat nutritious foods and avoid empty calories. Empty calories are calories you get from foods or beverages that do not have many nutrients, such as candy and soda. It is better to have a nutritious high-calorie food (such as an avocado) than a food with few nutrients (such as a bag of chips).  · Know how many calories are in the foods you eat most often. This way, you do not have to look up how many calories they have each time you eat them.  · Look out for foods that may seem like low-calorie foods but are really high-calorie foods, such as baked goods, soda, and fat-free candy.  · Pay attention to calories  in drinks. Drinks such as sodas, specialty coffee drinks, alcohol, and juices have a lot of calories yet do not fill you up. Choose low-calorie drinks like water and diet drinks.  · Focus your calorie counting efforts on higher calorie items. Logging the calories in a garden salad that contains only vegetables is less important than calculating the calories in a milk shake.  · Find a way of tracking calories that works for you. Get creative. Most people who are successful find ways to keep track of how much they eat in a day, even if they do not count every calorie.  WHAT ARE SOME PORTION CONTROL TIPS?  · Know how many calories are in a serving. This will help you know how many servings of a certain food you can have.  · Use a measuring cup to measure serving sizes. This is helpful when you start out. With time, you will be able to estimate serving sizes for some foods.  · Take some time to put servings of different foods on your favorite plates, bowls, and cups so you know what a serving looks like.  · Try not to eat straight from a bag or box. Doing this can lead to overeating. Put the amount you would like to eat in a cup or on a plate to make sure you are eating the right portion.  · Use smaller plates, glasses, and bowls to prevent overeating. This is a quick and easy way to practice portion control. If your plate is smaller, less food can fit on it.  · Try not to multitask while eating, such as watching TV or using your computer. If it is time to eat, sit down at a table and enjoy your food. Doing this will help you to start recognizing when you are full. It will also make you more aware of what and how much you are eating.  HOW CAN I CALORIE COUNT WHEN EATING OUT?  · Ask for smaller portion sizes or child-sized portions.  · Consider sharing an entree and sides instead of getting your own entree.  · If you get your own entree, eat only half. Ask for a box at the beginning of your meal and put the rest of your  "entree in it so you are not tempted to eat it.  · Look for the calories on the menu. If calories are listed, choose the lower calorie options.  · Choose dishes that include vegetables, fruits, whole grains, low-fat dairy products, and lean protein. Focusing on smart food choices from each of the 5 food groups can help you stay on track at restaurants.  · Choose items that are boiled, broiled, grilled, or steamed.  · Choose water, milk, unsweetened iced tea, or other drinks without added sugars. If you want an alcoholic beverage, choose a lower calorie option. For example, a regular jennie can have up to 700 calories and a glass of wine has around 150.  · Stay away from items that are buttered, battered, fried, or served with cream sauce. Items labeled \"crispy\" are usually fried, unless stated otherwise.  · Ask for dressings, sauces, and syrups on the side. These are usually very high in calories, so do not eat much of them.  · Watch out for salads. Many people think salads are a healthy option, but this is often not the case. Many salads come with cantu, fried chicken, lots of cheese, fried chips, and dressing. All of these items have a lot of calories. If you want a salad, choose a garden salad and ask for grilled meats or steak. Ask for the dressing on the side, or ask for olive oil and vinegar or lemon to use as dressing.  · Estimate how many servings of a food you are given. For example, a serving of cooked rice is ½ cup or about the size of half a tennis ball or one cupcake wrapper. Knowing serving sizes will help you be aware of how much food you are eating at restaurants. The list below tells you how big or small some common portion sizes are based on everyday objects.  ¨ 1 oz--4 stacked dice.  ¨ 3 oz--1 deck of cards.  ¨ 1 tsp--1 dice.  ¨ 1 Tbsp--½ a Ping-Pong ball.  ¨ 2 Tbsp--1 Ping-Pong ball.  ¨ ½ cup--1 tennis ball or 1 cupcake wrapper.  ¨ 1 cup--1 baseball.     This information is not intended to " replace advice given to you by your health care provider. Make sure you discuss any questions you have with your health care provider.     Document Released: 12/18/2006 Document Revised: 01/08/2016 Document Reviewed: 10/23/2014  [employer] Interactive Patient Education ©2016 [employer]  Sleep Apnea   Sleep apnea is a sleep disorder characterized by abnormal pauses in breathing while you sleep. When your breathing pauses, the level of oxygen in your blood decreases. This causes you to move out of deep sleep and into light sleep. As a result, your quality of sleep is poor, and the system that carries your blood throughout your body (cardiovascular system) experiences stress. If sleep apnea remains untreated, the following conditions can develop:  · High blood pressure (hypertension).  · Coronary artery disease.  · Inability to achieve or maintain an erection (impotence).  · Impairment of your thought process (cognitive dysfunction).  There are three types of sleep apnea:  1. Obstructive sleep apnea--Pauses in breathing during sleep because of a blocked airway.  2. Central sleep apnea--Pauses in breathing during sleep because the area of the brain that controls your breathing does not send the correct signals to the muscles that control breathing.  3. Mixed sleep apnea--A combination of both obstructive and central sleep apnea.  RISK FACTORS  The following risk factors can increase your risk of developing sleep apnea:  · Being overweight.  · Smoking.  · Having narrow passages in your nose and throat.  · Being of older age.  · Being male.  · Alcohol use.  · Sedative and tranquilizer use.  · Ethnicity. Among individuals younger than 35 years,  Americans are at increased risk of sleep apnea.  SYMPTOMS   · Difficulty staying asleep.  · Daytime sleepiness and fatigue.  · Loss of energy.  · Irritability.  · Loud, heavy snoring.  · Morning headaches.  · Trouble concentrating.  · Forgetfulness.  · Decreased interest in  "sex.  · Unexplained sleepiness.  DIAGNOSIS   In order to diagnose sleep apnea, your caregiver will perform a physical examination. A sleep study done in the comfort of your own home may be appropriate if you are otherwise healthy. Your caregiver may also recommend that you spend the night in a sleep lab. In the sleep lab, several monitors record information about your heart, lungs, and brain while you sleep. Your leg and arm movements and blood oxygen level are also recorded.  TREATMENT  The following actions may help to resolve mild sleep apnea:  · Sleeping on your side.    · Using a decongestant if you have nasal congestion.    · Avoiding the use of depressants, including alcohol, sedatives, and narcotics.    · Losing weight and modifying your diet if you are overweight.  There also are devices and treatments to help open your airway:  · Oral appliances. These are custom-made mouthpieces that shift your lower jaw forward and slightly open your bite. This opens your airway.  · Devices that create positive airway pressure. This positive pressure \"splints\" your airway open to help you breathe better during sleep. The following devices create positive airway pressure:  ¨ Continuous positive airway pressure (CPAP) device. The CPAP device creates a continuous level of air pressure with an air pump. The air is delivered to your airway through a mask while you sleep. This continuous pressure keeps your airway open.  ¨ Nasal expiratory positive airway pressure (EPAP) device. The EPAP device creates positive air pressure as you exhale. The device consists of single-use valves, which are inserted into each nostril and held in place by adhesive. The valves create very little resistance when you inhale but create much more resistance when you exhale. That increased resistance creates the positive airway pressure. This positive pressure while you exhale keeps your airway open, making it easier to breath when you inhale " again.  ¨ Bilevel positive airway pressure (BPAP) device. The BPAP device is used mainly in patients with central sleep apnea. This device is similar to the CPAP device because it also uses an air pump to deliver continuous air pressure through a mask. However, with the BPAP machine, the pressure is set at two different levels. The pressure when you exhale is lower than the pressure when you inhale.  · Surgery. Typically, surgery is only done if you cannot comply with less invasive treatments or if the less invasive treatments do not improve your condition. Surgery involves removing excess tissue in your airway to create a wider passage way.     This information is not intended to replace advice given to you by your health care provider. Make sure you discuss any questions you have with your health care provider.     Document Released: 12/08/2003 Document Revised: 01/08/2016 Document Reviewed: 04/25/2013  [employer] Interactive Patient Education ©2016 [employer]

## 2018-01-12 NOTE — PROGRESS NOTES
"     [facility]  Consult Note     Date: 1/12/2018 / Time: 11:29 AM    Patient ID:   Name:             Primo Ca   YOB: 1940  Age:                 77 y.o.  male   MRN:               [id number]      Thank you for requesting a sleep medicine consultation on Primo Ca at the sleep center. He presents today with the chief complaints of ALEX and to establish as a new pt. He was previously seen by . Pt was diagnosed with ALEX about and has been on 10 cm since then.The initial presenting symptoms were snoring, anddaytime sleepiness (EDS) . He is referred by [facility] for evaluation and treatment of sleep disorder breathing.     HISTORY OF PRESENT ILLNESS:       At night,  Primo Ca goes to bed around 9-11 pm on weekdays and around  on weekends. He gets out of bed at 7 am on weekdays and at weekends.  He  averages 8-9 hrs of sleep on a good night and 6 hrs on a bad night. Pt has bad nights 2 nights per month. He falls asleep within 5 minutes. He awakens 3 times a night due to bathroom use. It takes him 10 min to fall back asleep.     He is not aware of snoring/witnessed apneas/gasping or choking in sleep with use of CPAP.  He  denies any symptoms of restless legs syndrome such as an \"urge to move\"  He  legs in the evening or bedtime. He  denies any symptoms of narcolepsy such as sleep paralysis or cataplexy, or any symptoms to suggest parasomnias such as sleep walking or acting out of dreams. He  has not used any medications for her sleep problem.    Overall,  He does finds his sleep refreshing. When He  wakes up in the morning, He  does does feel tired. He does take regular naps. The naps are usually 30 min long.          REVIEW OF SYSTEMS:       Constitutional: Denies fevers, Denies weight changes  Eyes: Denies changes in vision, no eye pain  Ears/Nose/Throat/Mouth: Denies nasal congestion or sore throat   Cardiovascular: Denies chest pain or palpitations "   Respiratory: Denies shortness of breath , Denies cough  Gastrointestinal/Hepatic: Denies abdominal pain, nausea, vomiting, diarrhea, constipation or GI bleeding   Genitourinary: Denies bladder dysfunction, dysuria or frequency  Musculoskeletal/Rheum: Denies  joint pain and swelling   Skin/Breast: Denies rash, denies breast lumps or discharge  Neurological: Denies headache, confusion, memory loss or focal weakness/parasthesias  Psychiatric: denies mood disorder     Comprehensive review of systems form is reviewed with the patient and is attached in the EMR.     PMH:  has a past medical history of Diabetes; Dyslipidemia; GERD (gastroesophageal reflux disease); Gout; Hypertension; Obesity; and Vertigo. He also has no past medical history of Encounter for long-term (current) use of other medications.  MEDS:   Current Outpatient Prescriptions:   •  Blood Glucose Monitoring Suppl Supplies Misc, Test strips order: Test strips for One Touch Ultra 2 meter. Sig: use once daily. Dx: E11.40, Disp: 100 Each, Rfl: 3  •  atenolol (TENORMIN) 50 MG Tab, Take 1 Tab by mouth 2 times a day., Disp: 180 Tab, Rfl: 3  •  JANUMET  MG per tablet, TAKE 1 TABLET TWICE DAILY  WITH MEALS, Disp: 180 Tab, Rfl: 1  •  lovastatin (MEVACOR) 20 MG Tab, TAKE 3 TABLETS BY MOUTH EVERY DAY, Disp: 270 Tab, Rfl: 3  •  POTASSIUM PO, Take  by mouth., Disp: , Rfl:   •  hydrochlorothiazide (HYDRODIURIL) 25 MG Tab, TAKE 1 TABLET BY MOUTH EVERY DAY, Disp: 90 Tab, Rfl: 3  •  lisinopril (PRINIVIL) 20 MG Tab, TAKE 1 TABLET BY MOUTH TWICE DAILY, Disp: 180 Tab, Rfl: 3  •  diltiazem CD (CARTIA XT) 240 MG CAPSULE SR 24 HR, Take 1 Cap by mouth every day., Disp: 90 Cap, Rfl: 3  •  Cholecalciferol (VITAMIN D) 2000 UNITS Cap, Take 2,000 Units by mouth every day., Disp: , Rfl:   •  aspirin EC (ECOTRIN) 81 MG Tablet Delayed Response, Take 81 mg by mouth every evening., Disp: , Rfl:   •  Alpha-Lipoic Acid 600 MG CAPS, Take 1 Cap by mouth every day., Disp: , Rfl:   •   "cyanocobalamin (VITAMIN B-12) 500 MCG TABS, Take 500 mcg by mouth every day., Disp: , Rfl:   •  docosahexanoic acid (FISH OIL) 1000 MG CAPS, Take 1,000 mg by mouth every day., Disp: , Rfl:   •  indomethacin (INDOCIN) 50 MG Cap, Take 50 mg by mouth 3 times a day., Disp: , Rfl:   •  fluticasone (FLONASE) 50 MCG/ACT nasal spray, Spray 2 Sprays in nose every day., Disp: 16 g, Rfl: 3  ALLERGIES:   Allergies   Allergen Reactions   • Spironolactone      diarrhea     SURGHX:   Past Surgical History:   Procedure Laterality Date   • CHOLECYSTECTOMY       SOCHX:  reports that he quit smoking about 36 years ago. His smoking use included Pipe and Cigars. He started smoking about 42 years ago. He quit after 22.00 years of use. He has never used smokeless tobacco. He reports that he does not drink alcohol or use drugs..   FH:   Family History   Problem Relation Age of Onset   • Stroke Mother    • Hypertension Mother    • Cancer Father      colon cancer;liver   • Stroke Brother    • Hypertension Brother            Physical Exam:  Vitals/ General Appearance:   Weight/BMI: Body mass index is 33 kg/m².  Blood pressure 140/86, pulse (!) 52, resp. rate 16, height 1.778 m (5' 10\"), weight 104.3 kg (230 lb), SpO2 97 %.  Vitals:    01/12/18 1123   BP: 140/86   Pulse: (!) 52   Resp: 16   SpO2: 97%   Weight: 104.3 kg (230 lb)   Height: 1.778 m (5' 10\")       Pt. is alert and oriented to time, place and person. Cooperative and in no apparent distress.       1. Head: Atraumatic, normocephalic. There is no mandibular hypoplasia or maxillary over-jut.   2. Ears: Normal tympanic membrane and no discharge  3. Nose: No inferior turbinate hypertophy, no septal deviation, no polyp.   4. Throat: Oropharynx appears crowded in that the palate is overhanging (Malam Tammy scale 4.  uvula is large, pharynx not inflamed. Tongue is large.   5. Neck: Supple. No thyromegaly  6. Chest: Trachea central, no spine deformity 7. Lungs auscultation: B/L good air " entry, vesicular breath sounds, no adventitious sounds  8. Heart auscultation: 1st and 2nd heart sounds normal, regular rhythm. No appreciable murmur.  9. Abdomen: Soft, non tender, no organomegaly. Bowel sounds present  10. Extremities: no clubbing, no pedal edema.   Peripheral pulses felt.  11. Skin: No rash  12. NEUROLOGICAL EXAMINATION: On neurological exam, the patient was alert and oriented x3. speech was clear and fluent without dysarthria.  Cranial nerve exam II through XII was normal.   INVESTIGATIONS:       ASSESSMENT AND PLAN     1.Obstructive Sleep Apnea (ALEX).He  Is currently on CPAP 10 cm with ffm. 30 day compliance was downloaded which shows adequate compliance. The symptoms of excessive daytime, snoring and gasping has IMPROVED      The pathophysiology of ALEX and the increased risk of cardiovascular morbidity from untreated ALEX is discussed in detail with the patient. He  also has HTN and DM which can be worsened by her ALEX.     He is urged to avoid supine sleep, weight gain and alcoholic beverages since all of these can worsen ALEX. He is cautioned against drowsy driving. If He feels sleepy while driving, He must pull over for a break/nap, rather than persist on the road, in the interest of He own safety and that of others on the road.   Plan   - Continue CPAP 10 CM WITH  mask     - compliance download was reviewed and discussed with the pt   - compliance was reinforced   2. Obesity  - recommended healthy diet with portion control , aerobic exercise 5x week  - recommended nutritionist referral however pt deffered at the time  - obesity counseling done today: Drink more water. Reduce carb intake in drinks. Try to eat 3 meals a day with last meal 4-6 hours prior to bedtime. Limit caloric intake to 1600 - 1800 kcal per day.Restrict carbohydrate intake to 50 g per day to 100 g per day      3.  Regarding treatment of other past medical problems and general health maintenance,  He is urged to follow up  with PCP.    F/U in 6 months

## 2018-01-22 ENCOUNTER — OFFICE VISIT (OUTPATIENT)
Dept: MEDICAL GROUP | Facility: MEDICAL CENTER | Age: 78
End: 2018-01-22
Payer: MEDICARE

## 2018-01-22 ENCOUNTER — APPOINTMENT (RX ONLY)
Dept: URBAN - METROPOLITAN AREA CLINIC 4 | Facility: CLINIC | Age: 78
Setting detail: DERMATOLOGY
End: 2018-01-22

## 2018-01-22 VITALS
TEMPERATURE: 97.5 F | WEIGHT: 231.7 LBS | DIASTOLIC BLOOD PRESSURE: 86 MMHG | HEART RATE: 60 BPM | BODY MASS INDEX: 33.17 KG/M2 | OXYGEN SATURATION: 96 % | SYSTOLIC BLOOD PRESSURE: 132 MMHG | HEIGHT: 70 IN

## 2018-01-22 DIAGNOSIS — D22 MELANOCYTIC NEVI: ICD-10-CM

## 2018-01-22 DIAGNOSIS — E66.9 OBESITY (BMI 30-39.9): ICD-10-CM

## 2018-01-22 DIAGNOSIS — E11.40 TYPE 2 DIABETES, CONTROLLED, WITH NEUROPATHY (HCC): ICD-10-CM

## 2018-01-22 DIAGNOSIS — D18.0 HEMANGIOMA: ICD-10-CM

## 2018-01-22 DIAGNOSIS — L57.0 ACTINIC KERATOSIS: ICD-10-CM

## 2018-01-22 DIAGNOSIS — L82.1 OTHER SEBORRHEIC KERATOSIS: ICD-10-CM

## 2018-01-22 DIAGNOSIS — L72.8 OTHER FOLLICULAR CYSTS OF THE SKIN AND SUBCUTANEOUS TISSUE: ICD-10-CM

## 2018-01-22 DIAGNOSIS — L81.4 OTHER MELANIN HYPERPIGMENTATION: ICD-10-CM

## 2018-01-22 DIAGNOSIS — I10 ESSENTIAL HYPERTENSION: ICD-10-CM

## 2018-01-22 DIAGNOSIS — Z85.828 PERSONAL HISTORY OF OTHER MALIGNANT NEOPLASM OF SKIN: ICD-10-CM

## 2018-01-22 DIAGNOSIS — L57.8 OTHER SKIN CHANGES DUE TO CHRONIC EXPOSURE TO NONIONIZING RADIATION: ICD-10-CM

## 2018-01-22 DIAGNOSIS — L82.0 INFLAMED SEBORRHEIC KERATOSIS: ICD-10-CM

## 2018-01-22 PROBLEM — D18.01 HEMANGIOMA OF SKIN AND SUBCUTANEOUS TISSUE: Status: ACTIVE | Noted: 2018-01-22

## 2018-01-22 PROBLEM — R80.9 PROTEINURIA: Status: RESOLVED | Noted: 2018-01-22 | Resolved: 2018-01-22

## 2018-01-22 PROBLEM — D22.5 MELANOCYTIC NEVI OF TRUNK: Status: ACTIVE | Noted: 2018-01-22

## 2018-01-22 PROBLEM — R80.9 PROTEINURIA: Status: ACTIVE | Noted: 2018-01-22

## 2018-01-22 LAB
APPEARANCE UR: NORMAL
BILIRUB UR STRIP-MCNC: NEGATIVE MG/DL
COLOR UR AUTO: YELLOW
GLUCOSE UR STRIP.AUTO-MCNC: NEGATIVE MG/DL
KETONES UR STRIP.AUTO-MCNC: NEGATIVE MG/DL
LEUKOCYTE ESTERASE UR QL STRIP.AUTO: NEGATIVE
NITRITE UR QL STRIP.AUTO: NEGATIVE
PH UR STRIP.AUTO: 5 [PH] (ref 5–8)
PROT UR QL STRIP: 100 MG/DL
RBC UR QL AUTO: NORMAL
SP GR UR STRIP.AUTO: 1.01
UROBILINOGEN UR STRIP-MCNC: NEGATIVE MG/DL

## 2018-01-22 PROCEDURE — 99213 OFFICE O/P EST LOW 20 MIN: CPT | Mod: 25

## 2018-01-22 PROCEDURE — ? LIQUID NITROGEN

## 2018-01-22 PROCEDURE — ? COUNSELING

## 2018-01-22 PROCEDURE — ? OBSERVATION

## 2018-01-22 PROCEDURE — 99214 OFFICE O/P EST MOD 30 MIN: CPT | Performed by: FAMILY MEDICINE

## 2018-01-22 PROCEDURE — 81002 URINALYSIS NONAUTO W/O SCOPE: CPT | Performed by: FAMILY MEDICINE

## 2018-01-22 PROCEDURE — 17004 DESTROY PREMAL LESIONS 15/>: CPT

## 2018-01-22 PROCEDURE — 17110 DESTRUCTION B9 LES UP TO 14: CPT | Mod: 59

## 2018-01-22 ASSESSMENT — LOCATION DETAILED DESCRIPTION DERM
LOCATION DETAILED: LEFT PROXIMAL DORSAL FOREARM
LOCATION DETAILED: LEFT MEDIAL FOREHEAD
LOCATION DETAILED: RIGHT LATERAL ELBOW
LOCATION DETAILED: RIGHT INFERIOR FOREHEAD
LOCATION DETAILED: LEFT DISTAL DORSAL FOREARM
LOCATION DETAILED: RIGHT DISTAL RADIAL DORSAL FOREARM
LOCATION DETAILED: RIGHT MID TEMPLE
LOCATION DETAILED: LEFT SUPERIOR FOREHEAD
LOCATION DETAILED: LEFT ULNAR DORSAL HAND
LOCATION DETAILED: RIGHT SUPERIOR MEDIAL UPPER BACK
LOCATION DETAILED: LEFT MEDIAL INFERIOR CHEST
LOCATION DETAILED: LEFT DORSAL MIDDLE METACARPOPHALANGEAL JOINT
LOCATION DETAILED: RIGHT ULNAR DORSAL HAND
LOCATION DETAILED: GLABELLA
LOCATION DETAILED: LEFT LATERAL FOREHEAD
LOCATION DETAILED: RIGHT DISTAL POSTERIOR UPPER ARM
LOCATION DETAILED: LEFT SUPERIOR ANTERIOR NECK
LOCATION DETAILED: RIGHT SUPERIOR LATERAL MALAR CHEEK
LOCATION DETAILED: LEFT MEDIAL SUPERIOR CHEST
LOCATION DETAILED: STERNUM
LOCATION DETAILED: RIGHT INFERIOR LATERAL FOREHEAD
LOCATION DETAILED: RIGHT DORSAL WRIST
LOCATION DETAILED: LEFT DORSAL WRIST
LOCATION DETAILED: RIGHT PROXIMAL RADIAL DORSAL FOREARM
LOCATION DETAILED: LEFT PROXIMAL RADIAL DORSAL FOREARM
LOCATION DETAILED: LEFT ELBOW
LOCATION DETAILED: RIGHT RADIAL DORSAL HAND
LOCATION DETAILED: LEFT RADIAL DORSAL HAND
LOCATION DETAILED: RIGHT SUPERIOR UPPER BACK
LOCATION DETAILED: LEFT PROXIMAL POSTERIOR UPPER ARM
LOCATION DETAILED: LEFT CLAVICULAR SKIN
LOCATION DETAILED: LEFT CENTRAL LATERAL NECK
LOCATION DETAILED: LEFT INFERIOR TEMPLE
LOCATION DETAILED: LEFT INFERIOR LATERAL FOREHEAD
LOCATION DETAILED: RIGHT INFERIOR CENTRAL MALAR CHEEK

## 2018-01-22 ASSESSMENT — LOCATION SIMPLE DESCRIPTION DERM
LOCATION SIMPLE: RIGHT CHEEK
LOCATION SIMPLE: NECK
LOCATION SIMPLE: LEFT FOREHEAD
LOCATION SIMPLE: GLABELLA
LOCATION SIMPLE: LEFT WRIST
LOCATION SIMPLE: LEFT HAND
LOCATION SIMPLE: RIGHT UPPER BACK
LOCATION SIMPLE: RIGHT UPPER ARM
LOCATION SIMPLE: LEFT ANTERIOR NECK
LOCATION SIMPLE: LEFT FOREARM
LOCATION SIMPLE: RIGHT FOREARM
LOCATION SIMPLE: RIGHT FOREHEAD
LOCATION SIMPLE: RIGHT ELBOW
LOCATION SIMPLE: CHEST
LOCATION SIMPLE: RIGHT HAND
LOCATION SIMPLE: LEFT CLAVICULAR SKIN
LOCATION SIMPLE: LEFT TEMPLE
LOCATION SIMPLE: RIGHT WRIST
LOCATION SIMPLE: LEFT ELBOW
LOCATION SIMPLE: RIGHT TEMPLE
LOCATION SIMPLE: LEFT UPPER ARM

## 2018-01-22 ASSESSMENT — LOCATION ZONE DERM
LOCATION ZONE: HAND
LOCATION ZONE: ARM
LOCATION ZONE: NECK
LOCATION ZONE: TRUNK
LOCATION ZONE: FACE

## 2018-01-22 NOTE — PROGRESS NOTES
Subjective:   Primo Ca is a 77 y.o. male here today for diabetes    Type 2 diabetes, controlled, with neuropathy  HbA1c is currently 6.9 down from 7.4 at last visit.  Took a recent trip to [place] that necessitated walking multiple miles. Diet was improved during the visit. Lost 10 lbs during travel, but regained weight.    Obesity (BMI 30-39.9)  Was in [place] and walking regularly and eating better. He has not resumed walking since coming home.    HTN (hypertension)  Patient occasionally gets spikes in his blood pressure, he controls this with doubling up on atenolol.         Current medicines (including changes today)  Current Outpatient Prescriptions   Medication Sig Dispense Refill   • Blood Glucose Monitoring Suppl Supplies Misc Test strips order: Test strips for One Touch Ultra 2 meter. Sig: use once daily. Dx: E11.40 100 Each 3   • atenolol (TENORMIN) 50 MG Tab Take 1 Tab by mouth 2 times a day. 180 Tab 3   • JANUMET  MG per tablet TAKE 1 TABLET TWICE DAILY  WITH MEALS 180 Tab 1   • lovastatin (MEVACOR) 20 MG Tab TAKE 3 TABLETS BY MOUTH EVERY  Tab 3   • POTASSIUM PO Take  by mouth.     • hydrochlorothiazide (HYDRODIURIL) 25 MG Tab TAKE 1 TABLET BY MOUTH EVERY DAY 90 Tab 3   • lisinopril (PRINIVIL) 20 MG Tab TAKE 1 TABLET BY MOUTH TWICE DAILY 180 Tab 3   • diltiazem CD (CARTIA XT) 240 MG CAPSULE SR 24 HR Take 1 Cap by mouth every day. 90 Cap 3   • indomethacin (INDOCIN) 50 MG Cap Take 50 mg by mouth 3 times a day.     • fluticasone (FLONASE) 50 MCG/ACT nasal spray Spray 2 Sprays in nose every day. 16 g 3   • Cholecalciferol (VITAMIN D) 2000 UNITS Cap Take 2,000 Units by mouth every day.     • aspirin EC (ECOTRIN) 81 MG Tablet Delayed Response Take 81 mg by mouth every evening.     • Alpha-Lipoic Acid 600 MG CAPS Take 1 Cap by mouth every day.     • cyanocobalamin (VITAMIN B-12) 500 MCG TABS Take 500 mcg by mouth every day.     • docosahexanoic acid (FISH OIL) 1000 MG CAPS Take 1,000  "mg by mouth every day.       No current facility-administered medications for this visit.      He  has a past medical history of Diabetes; Dyslipidemia; GERD (gastroesophageal reflux disease); Gout; Hypertension; Obesity; Sleep apnea; Tuberculosis; and Vertigo. He also has no past medical history of Encounter for long-term (current) use of other medications.    ROS   No chest pain, no shortness of breath       Objective:     Blood pressure 132/86, pulse 60, temperature 36.4 °C (97.5 °F), height 1.778 m (5' 10\"), weight 105.1 kg (231 lb 11.2 oz), SpO2 96 %. Body mass index is 33.25 kg/m².   Physical Exam:  Constitutional: Alert, no distress.  Skin: Warm, dry, good turgor, no rashes in visible areas.  Eye: Equal, round and reactive, conjunctiva clear, lids normal.  Psych: Alert and oriented x3, normal affect and mood.        Assessment and Plan:   The following treatment plan was discussed    1. Type 2 diabetes, controlled, with neuropathy (CMS-HCC)  Positive microalbuminuria, we will check a UA today in clinic.  Advised healthy lifestyle. Continue current medication. Follow up with labs in 6 months.  - COMP METABOLIC PANEL; Future  - HEMOGLOBIN A1C; Future  - MICROALBUMIN CREAT RATIO URINE; Future  - LIPID PROFILE; Future  - POCT Urinalysis    2. Essential hypertension  Advised patient to monitor. If he gets elevation consider increasing atenolol. Continue current medication for now.    3. Obesity (BMI 30-39.9)  Advised diet and exercise.      Followup: Return in about 6 months (around 7/22/2018) for Diabetes.         "

## 2018-01-22 NOTE — ASSESSMENT & PLAN NOTE
Patient occasionally gets spikes in his blood pressure, he controls this with doubling up on atenolol.

## 2018-01-22 NOTE — ASSESSMENT & PLAN NOTE
HbA1c is currently 6.9 down from 7.4 at last visit.  Took a recent trip to [place] that necessitated walking multiple miles. Diet was improved during the visit. Lost 10 lbs during travel, but regained weight.

## 2018-01-22 NOTE — PROCEDURE: LIQUID NITROGEN
Render Post-Care Instructions In Note?: no
Number Of Freeze-Thaw Cycles: 2 freeze-thaw cycles
Duration Of Freeze Thaw-Cycle (Seconds): 3
Detail Level: Simple
Aperture Size (Optional): C
Consent: The patient's consent was obtained including but not limited to risks of crusting, scabbing, blistering, scarring, darker or lighter pigmentary change, recurrence, incomplete removal and infection.
Post-Care Instructions: I reviewed with the patient in detail post-care instructions. Patient is to wear sunprotection, and avoid picking at any of the treated lesions. Pt may apply Vaseline to crusted or scabbing areas.
Detail Level: Detailed
Medical Necessity Clause: This procedure was medically necessary because the lesions that were treated were:
Medical Necessity Information: It is in your best interest to select a reason for this procedure from the list below. All of these items fulfill various CMS LCD requirements except the new and changing color options.

## 2018-01-22 NOTE — ASSESSMENT & PLAN NOTE
Was in [place] and walking regularly and eating better. He has not resumed walking since coming home.

## 2018-01-24 ENCOUNTER — PATIENT MESSAGE (OUTPATIENT)
Dept: MEDICAL GROUP | Facility: MEDICAL CENTER | Age: 78
End: 2018-01-24

## 2018-01-24 NOTE — TELEPHONE ENCOUNTER
From: Primo Ca  To: Jefferson Garcia M.D.  Sent: 1/24/2018 7:40 AM PST  Subject: Test Result Question    I have not received the result of the urine test I took on visit Monday. How did it look ? Alvarado

## 2018-03-01 ENCOUNTER — PATIENT MESSAGE (OUTPATIENT)
Dept: MEDICAL GROUP | Facility: MEDICAL CENTER | Age: 78
End: 2018-03-01

## 2018-03-01 NOTE — TELEPHONE ENCOUNTER
From: Primo Ca  To: Jefferson Garcia M.D.  Sent: 3/1/2018 10:35 AM PST  Subject: Non-Urgent Medical Question    Here in [place] fo 10th day. Pressure has been rising since we left [place] and was hitting 200 over 100-110 marks on occasion with meds. Saw a DR yesterday and he recommended Lorazapram 1/2 mg when feeling anxiety which was working about levels. Worked last night but this morning 164/99 when I awoke. Will monitor and take extra 50 mg of Atenalol midday. Leaving Sat and drive [place] Sunday and arrive Home. Can we set up appointment next week Tu-Fri ? Thanks. Lorazapam seems to working. Atenolol mfg was changed in prescription before I left. Thanks.

## 2018-03-06 ENCOUNTER — OFFICE VISIT (OUTPATIENT)
Dept: MEDICAL GROUP | Facility: MEDICAL CENTER | Age: 78
End: 2018-03-06
Payer: MEDICARE

## 2018-03-06 VITALS
HEART RATE: 64 BPM | TEMPERATURE: 96.5 F | DIASTOLIC BLOOD PRESSURE: 86 MMHG | SYSTOLIC BLOOD PRESSURE: 138 MMHG | RESPIRATION RATE: 14 BRPM | BODY MASS INDEX: 33.64 KG/M2 | HEIGHT: 70 IN | WEIGHT: 235 LBS | OXYGEN SATURATION: 94 %

## 2018-03-06 DIAGNOSIS — I10 ESSENTIAL HYPERTENSION: ICD-10-CM

## 2018-03-06 DIAGNOSIS — E66.9 OBESITY (BMI 30-39.9): ICD-10-CM

## 2018-03-06 DIAGNOSIS — F41.8 ANXIETY ABOUT HEALTH: ICD-10-CM

## 2018-03-06 PROCEDURE — 99214 OFFICE O/P EST MOD 30 MIN: CPT | Performed by: FAMILY MEDICINE

## 2018-03-06 RX ORDER — LORAZEPAM 1 MG/1
1 TABLET ORAL
Status: SHIPPED | DISCHARGE
Start: 2018-03-06 | End: 2018-04-05

## 2018-03-06 NOTE — ASSESSMENT & PLAN NOTE
When blood pressure spikes he gets very anxious.  He was given ativan 1 mg, half tablet, which did help significantly. He has left over tablets from the urgent care in [place].  When he worked he used to be on Librium 5 mg as needed, 30 tablets lasted him 3 months.

## 2018-03-06 NOTE — ASSESSMENT & PLAN NOTE
While on vacation in [place] his blood pressure spiked up 180-202/100-117. He went in to the urgent care and was given ativan 1 mg, half tablet, has taken 4 halves in the week. Has not needed it since you left. He was getting stressed out from his elevated blood pressure, but no stress before taking blood pressure. Blood pressure improved with ativan. Blood pressure spiked after eating at a mexican restaurant the night before.  Now that he is back home his stress level is down. He is back to eating his normal diet.

## 2018-03-06 NOTE — PROGRESS NOTES
Subjective:   Primo Ca is a 77 y.o. male here today for HTN    HTN (hypertension)  While on vacation in [place] his blood pressure spiked up 180-202/100-117. He went in to the urgent care and was given ativan 1 mg, half tablet, has taken 4 halves in the week. Has not needed it since you left. He was getting stressed out from his elevated blood pressure, but no stress before taking blood pressure. Blood pressure improved with ativan. Blood pressure spiked after eating at a mexican restaurant the night before.  Now that he is back home his stress level is down. He is back to eating his normal diet.    Anxiety about health  When blood pressure spikes he gets very anxious.  He was given ativan 1 mg, half tablet, which did help significantly. He has left over tablets from the urgent care in [place].  When he worked he used to be on Librium 5 mg as needed, 30 tablets lasted him 3 months.    Obesity (BMI 30-39.9)  He has cut back his sodium and he is going to back to walking.         Current medicines (including changes today)  Current Outpatient Prescriptions   Medication Sig Dispense Refill   • LORazepam (ATIVAN) 1 MG Tab Take 1 Tab by mouth 1 time daily as needed for Anxiety for up to 30 days.     • Blood Glucose Monitoring Suppl Supplies Misc Test strips order: Test strips for One Touch Ultra 2 meter. Sig: use once daily. Dx: E11.40 100 Each 3   • atenolol (TENORMIN) 50 MG Tab Take 1 Tab by mouth 2 times a day. 180 Tab 3   • JANUMET  MG per tablet TAKE 1 TABLET TWICE DAILY  WITH MEALS 180 Tab 1   • lovastatin (MEVACOR) 20 MG Tab TAKE 3 TABLETS BY MOUTH EVERY  Tab 3   • POTASSIUM PO Take  by mouth.     • hydrochlorothiazide (HYDRODIURIL) 25 MG Tab TAKE 1 TABLET BY MOUTH EVERY DAY 90 Tab 3   • lisinopril (PRINIVIL) 20 MG Tab TAKE 1 TABLET BY MOUTH TWICE DAILY 180 Tab 3   • diltiazem CD (CARTIA XT) 240 MG CAPSULE SR 24 HR Take 1 Cap by mouth every day. 90 Cap 3   • indomethacin  "(INDOCIN) 50 MG Cap Take 50 mg by mouth 3 times a day.     • fluticasone (FLONASE) 50 MCG/ACT nasal spray Spray 2 Sprays in nose every day. 16 g 3   • Cholecalciferol (VITAMIN D) 2000 UNITS Cap Take 2,000 Units by mouth every day.     • aspirin EC (ECOTRIN) 81 MG Tablet Delayed Response Take 81 mg by mouth every evening.     • Alpha-Lipoic Acid 600 MG CAPS Take 1 Cap by mouth every day.     • cyanocobalamin (VITAMIN B-12) 500 MCG TABS Take 500 mcg by mouth every day.     • docosahexanoic acid (FISH OIL) 1000 MG CAPS Take 1,000 mg by mouth every day.       No current facility-administered medications for this visit.      He  has a past medical history of Diabetes; Dyslipidemia; GERD (gastroesophageal reflux disease); Gout; Hypertension; Obesity; Sleep apnea; Tuberculosis; and Vertigo. He also has no past medical history of Encounter for long-term (current) use of other medications.    ROS   No chest pain, no shortness of breath       Objective:     Blood pressure 138/86, pulse 64, temperature 35.8 °C (96.5 °F), resp. rate 14, height 1.778 m (5' 10\"), weight 106.6 kg (235 lb), SpO2 94 %. Body mass index is 33.72 kg/m².   Physical Exam:  Constitutional: Alert, no distress.  Skin: Warm, dry, good turgor, no rashes in visible areas.  Eye: Equal, round and reactive, conjunctiva clear, lids normal.  Respiratory: Unlabored respiratory effort, lungs clear to auscultation, no wheezes, no ronchi.  Cardiovascular: Normal S1, S2, no murmur, no edema.  Psych: Alert and oriented x3, normal affect and mood.        Assessment and Plan:   The following treatment plan was discussed    1. Essential hypertension  Controlled with current medication. Continue to monitor. Follow up in 4 months.    2. Anxiety about health  Advised patient to minimize use of ativan as needed.  - LORazepam (ATIVAN) 1 MG Tab; Take 1 Tab by mouth 1 time daily as needed for Anxiety for up to 30 days.    3. Obesity (BMI 30-39.9)  - Patient identified as having " weight management issue.  Appropriate orders and counseling given.      Followup: Return in about 4 months (around 7/6/2018) for Diabetes.

## 2018-03-14 ENCOUNTER — PATIENT MESSAGE (OUTPATIENT)
Dept: MEDICAL GROUP | Facility: MEDICAL CENTER | Age: 78
End: 2018-03-14

## 2018-03-14 RX ORDER — ATENOLOL 50 MG/1
50 TABLET ORAL 2 TIMES DAILY
Qty: 60 TAB | Refills: 1 | Status: SHIPPED | OUTPATIENT
Start: 2018-03-14 | End: 2018-04-05 | Stop reason: SDUPTHER

## 2018-03-14 NOTE — TELEPHONE ENCOUNTER
From: Primo Ca  To: Jefferson Garcia M.D.  Sent: 3/14/2018 2:26 PM PDT  Subject: Prescription Question    I have been receiving Atenol 50 from [facility] on [facility] for years and they changed Mfr. I have searched the matter and I found that [facility] uses the mfr/brand ZYDUS I had been on. You and I talked about how I felt the new Mfr brand has BP running higher. Can we submit a prescription for say 30 day supply of their ZYDUS Atenol 50mg x 2 daily to take and see if their is change to prior readings before [facility] switched Mfr on me ? Can you call in 30 day twice daily prescription-60 ? It is in stock at [facility] on [address] at phone [phone]. Thanks.Alvarado

## 2018-04-05 ENCOUNTER — PATIENT MESSAGE (OUTPATIENT)
Dept: MEDICAL GROUP | Facility: MEDICAL CENTER | Age: 78
End: 2018-04-05

## 2018-04-05 RX ORDER — ATENOLOL 50 MG/1
50 TABLET ORAL 2 TIMES DAILY
Qty: 180 TAB | Refills: 3 | Status: SHIPPED | OUTPATIENT
Start: 2018-04-05 | End: 2018-04-16 | Stop reason: SDUPTHER

## 2018-04-05 NOTE — TELEPHONE ENCOUNTER
From: Primo Ca  To: Jefferson Garcia M.D.  Sent: 4/5/2018 11:20 AM PDT  Subject: Non-Urgent Medical Question    Doc, can I get you to call in new prescription to increase Atenolol monthly prescription on file at [facility] TO a 90 day order. I am very pleased at control this 's generic has worked the last month. Good readings.     Also, wife Evelyn is getting over 7 days of flu treated by her PA, AND guess what ? I am feel I might be developing similar symptoms she encountered when she contracted it. Shall I monitor it and wait and see what develops before treating it with medications in preventative mode, or start medicating to combat it ?     Thanks

## 2018-04-16 RX ORDER — ATENOLOL 50 MG/1
50 TABLET ORAL 2 TIMES DAILY
Qty: 180 TAB | Refills: 3 | Status: SHIPPED | OUTPATIENT
Start: 2018-04-16 | End: 2018-10-08

## 2018-04-16 NOTE — TELEPHONE ENCOUNTER
----- Message from Primo Ca sent at 4/16/2018  1:49 PM PDT -----  Regarding: Prescription Question  Contact: [phone]  Atenalol 50 mg twice daily prescription with [facility] on [address].  I called to  and it was ready but for only 30 days and not 90 days as we discussed and you said you would call in several weeks ago.  Can you call and change the existing prescription [id number] from a 30 to a 90 days supply.  Average past 2 weeks has been 163/73 and have done little walking. No spikes noted past month on change to old pills.  They appear to control better.  Allergies are bad and using Fluticasone once a day.  Will start walking and try to lower upper range maybe caused by spray.  Thanks.

## 2018-05-07 ENCOUNTER — PATIENT MESSAGE (OUTPATIENT)
Dept: MEDICAL GROUP | Facility: MEDICAL CENTER | Age: 78
End: 2018-05-07

## 2018-05-07 DIAGNOSIS — F41.8 ANXIETY ABOUT HEALTH: ICD-10-CM

## 2018-05-07 DIAGNOSIS — J30.1 SEASONAL ALLERGIC RHINITIS DUE TO POLLEN: ICD-10-CM

## 2018-05-07 RX ORDER — LORAZEPAM 1 MG/1
0.5 TABLET ORAL 2 TIMES DAILY PRN
Qty: 30 TAB | Refills: 1 | Status: SHIPPED
Start: 2018-05-07 | End: 2018-06-06

## 2018-05-07 RX ORDER — FLUTICASONE PROPIONATE 50 MCG
SPRAY, SUSPENSION (ML) NASAL
Qty: 3 BOTTLE | Refills: 3 | Status: SHIPPED | OUTPATIENT
Start: 2018-05-07

## 2018-05-07 RX ORDER — LORAZEPAM 1 MG/1
0.5 TABLET ORAL 2 TIMES DAILY PRN
Qty: 10 TAB | Refills: 1 | Status: SHIPPED
Start: 2018-05-07 | End: 2018-05-07 | Stop reason: SDUPTHER

## 2018-05-07 NOTE — TELEPHONE ENCOUNTER
From: Primo Ca  To: Jefferson Garcia M.D.  Sent: 5/7/2018 9:47 AM PDT  Subject: Prescription Question    about 2 months ago I saw a Doc in [place] when BP spiked and he gave me prescription for Lorazepam 1 mg for anxiety (take 1 1/2 tab .05 mg twice daily). I used the pills for 2 days, and came home with 8 which I have used 5 more in past 2 months. Saw you when I got home. I would like be prepared and to get 30 pills supply for future use when BP might spike in coming months. BP appears OK, then I will get spike from salty food or something, which will cause my pressure to elevate. This has occurred maybe twice in past 8 weeks, but in comes down with 1/2-1 Lorazepam. BP elevation causes anxiety. Can you call in a #30 prescription for me to [facility].

## 2018-05-11 ENCOUNTER — HOSPITAL ENCOUNTER (OUTPATIENT)
Dept: LAB | Facility: MEDICAL CENTER | Age: 78
End: 2018-05-11
Attending: UROLOGY
Payer: MEDICARE

## 2018-05-11 PROCEDURE — 84154 ASSAY OF PSA FREE: CPT

## 2018-05-11 PROCEDURE — 84153 ASSAY OF PSA TOTAL: CPT

## 2018-05-11 PROCEDURE — 36415 COLL VENOUS BLD VENIPUNCTURE: CPT

## 2018-05-12 LAB
PSA FREE MFR SERPL: 22 %
PSA FREE SERPL-MCNC: 1.2 NG/ML
PSA SERPL-MCNC: 5.5 NG/ML (ref 0–4)

## 2018-05-23 RX ORDER — SITAGLIPTIN AND METFORMIN HYDROCHLORIDE 1000; 50 MG/1; MG/1
TABLET, FILM COATED ORAL
Qty: 180 TAB | Refills: 3 | Status: SHIPPED | OUTPATIENT
Start: 2018-05-23 | End: 2019-06-03

## 2018-06-28 RX ORDER — HYDROCHLOROTHIAZIDE 25 MG/1
TABLET ORAL
Qty: 90 TAB | Refills: 3 | Status: SHIPPED | OUTPATIENT
Start: 2018-06-28 | End: 2018-10-08

## 2018-06-28 RX ORDER — DILTIAZEM HYDROCHLORIDE 240 MG/1
CAPSULE, EXTENDED RELEASE ORAL
Qty: 90 CAP | Refills: 3 | Status: SHIPPED | OUTPATIENT
Start: 2018-06-28 | End: 2019-01-08

## 2018-08-16 ENCOUNTER — HOSPITAL ENCOUNTER (OUTPATIENT)
Dept: LAB | Facility: MEDICAL CENTER | Age: 78
End: 2018-08-16
Attending: FAMILY MEDICINE
Payer: MEDICARE

## 2018-08-16 DIAGNOSIS — E11.40 TYPE 2 DIABETES, CONTROLLED, WITH NEUROPATHY (HCC): ICD-10-CM

## 2018-08-16 PROCEDURE — 80061 LIPID PANEL: CPT

## 2018-08-16 PROCEDURE — 80053 COMPREHEN METABOLIC PANEL: CPT

## 2018-08-16 PROCEDURE — 83036 HEMOGLOBIN GLYCOSYLATED A1C: CPT | Mod: GA

## 2018-08-16 PROCEDURE — 36415 COLL VENOUS BLD VENIPUNCTURE: CPT

## 2018-08-16 PROCEDURE — 82043 UR ALBUMIN QUANTITATIVE: CPT

## 2018-08-16 PROCEDURE — 82570 ASSAY OF URINE CREATININE: CPT

## 2018-08-17 LAB
ALBUMIN SERPL BCP-MCNC: 4.1 G/DL (ref 3.2–4.9)
ALBUMIN/GLOB SERPL: 1.3 G/DL
ALP SERPL-CCNC: 61 U/L (ref 30–99)
ALT SERPL-CCNC: 12 U/L (ref 2–50)
ANION GAP SERPL CALC-SCNC: 8 MMOL/L (ref 0–11.9)
AST SERPL-CCNC: 13 U/L (ref 12–45)
BILIRUB SERPL-MCNC: 0.4 MG/DL (ref 0.1–1.5)
BUN SERPL-MCNC: 16 MG/DL (ref 8–22)
CALCIUM SERPL-MCNC: 9.4 MG/DL (ref 8.5–10.5)
CHLORIDE SERPL-SCNC: 101 MMOL/L (ref 96–112)
CHOLEST SERPL-MCNC: 141 MG/DL (ref 100–199)
CO2 SERPL-SCNC: 30 MMOL/L (ref 20–33)
CREAT SERPL-MCNC: 1.13 MG/DL (ref 0.5–1.4)
CREAT UR-MCNC: 110.7 MG/DL
EST. AVERAGE GLUCOSE BLD GHB EST-MCNC: 171 MG/DL
GLOBULIN SER CALC-MCNC: 3.2 G/DL (ref 1.9–3.5)
GLUCOSE SERPL-MCNC: 146 MG/DL (ref 65–99)
HBA1C MFR BLD: 7.6 % (ref 0–5.6)
HDLC SERPL-MCNC: 38 MG/DL
LDLC SERPL CALC-MCNC: 53 MG/DL
MICROALBUMIN UR-MCNC: 22.8 MG/DL
MICROALBUMIN/CREAT UR: 206 MG/G (ref 0–30)
POTASSIUM SERPL-SCNC: 3.9 MMOL/L (ref 3.6–5.5)
PROT SERPL-MCNC: 7.3 G/DL (ref 6–8.2)
SODIUM SERPL-SCNC: 139 MMOL/L (ref 135–145)
TRIGL SERPL-MCNC: 252 MG/DL (ref 0–149)

## 2018-08-20 ENCOUNTER — OFFICE VISIT (OUTPATIENT)
Dept: MEDICAL GROUP | Facility: MEDICAL CENTER | Age: 78
End: 2018-08-20
Payer: MEDICARE

## 2018-08-20 VITALS
SYSTOLIC BLOOD PRESSURE: 144 MMHG | WEIGHT: 231 LBS | HEART RATE: 61 BPM | HEIGHT: 70 IN | BODY MASS INDEX: 33.07 KG/M2 | TEMPERATURE: 96 F | DIASTOLIC BLOOD PRESSURE: 80 MMHG | OXYGEN SATURATION: 98 %

## 2018-08-20 DIAGNOSIS — E11.40 TYPE 2 DIABETES, CONTROLLED, WITH NEUROPATHY (HCC): ICD-10-CM

## 2018-08-20 DIAGNOSIS — G60.9 PERIPHERAL NEUROPATHY, IDIOPATHIC: ICD-10-CM

## 2018-08-20 DIAGNOSIS — I10 ESSENTIAL HYPERTENSION: ICD-10-CM

## 2018-08-20 DIAGNOSIS — E78.5 DYSLIPIDEMIA: ICD-10-CM

## 2018-08-20 DIAGNOSIS — E66.9 OBESITY (BMI 30-39.9): ICD-10-CM

## 2018-08-20 PROCEDURE — 99214 OFFICE O/P EST MOD 30 MIN: CPT | Performed by: FAMILY MEDICINE

## 2018-08-20 ASSESSMENT — PATIENT HEALTH QUESTIONNAIRE - PHQ9: CLINICAL INTERPRETATION OF PHQ2 SCORE: 0

## 2018-08-20 NOTE — ASSESSMENT & PLAN NOTE
Patient is complaining of bilateral neuropathy in distal feet, worse left than right.  He is requesting referral to podiatry.  He previously was receiving acupuncture treatment which was helping, since he discontinued acupuncture, his neuropathy has increased.  He states it feels like he is walking on sponges.

## 2018-08-20 NOTE — ASSESSMENT & PLAN NOTE
Patient is tolerating lovastatin 20 mg daily.  He admits that his diet was poor and his exercise has been decreased over the last 5 weeks, because he has been in [place] visiting family.

## 2018-08-20 NOTE — ASSESSMENT & PLAN NOTE
Patient is tolerating atenolol 50 mg twice daily, hydrochlorothiazide 25 mg daily, lisinopril 20 mg daily, Cartia  mg daily.

## 2018-08-20 NOTE — PROGRESS NOTES
Subjective:   Mercedez Ca is a 77 y.o. male here today for diabetes    Dyslipidemia  Patient is tolerating lovastatin 20 mg daily.  He admits that his diet was poor and his exercise has been decreased over the last 5 weeks, because he has been in [place] visiting family.    HTN (hypertension)  Patient is tolerating atenolol 50 mg twice daily, hydrochlorothiazide 25 mg daily, lisinopril 20 mg daily, Cartia  mg daily.    Peripheral neuropathy, idiopathic  Patient is complaining of bilateral neuropathy in distal feet, worse left than right.  He is requesting referral to podiatry.  He previously was receiving acupuncture treatment which was helping, since he discontinued acupuncture, his neuropathy has increased.  He states it feels like he is walking on sponges.    Type 2 diabetes, controlled, with neuropathy  A1c has increased over the last 6 months because diet and exercise have been poor recently.  He is currently on Janumet  milligrams twice daily.       Results for MERCEDEZ CA (MRN [id number]) as of 8/20/2018 10:41   Ref. Range 8/16/2018 08:02   Sodium Latest Ref Range: 135 - 145 mmol/L 139   Potassium Latest Ref Range: 3.6 - 5.5 mmol/L 3.9   Chloride Latest Ref Range: 96 - 112 mmol/L 101   Co2 Latest Ref Range: 20 - 33 mmol/L 30   Anion Gap Latest Ref Range: 0.0 - 11.9  8.0   Glucose Latest Ref Range: 65 - 99 mg/dL 146 (H)   Bun Latest Ref Range: 8 - 22 mg/dL 16   Creatinine Latest Ref Range: 0.50 - 1.40 mg/dL 1.13   GFR If  Latest Ref Range: >60 mL/min/1.73 m 2 >60   GFR If Non  Latest Ref Range: >60 mL/min/1.73 m 2 >60   Calcium Latest Ref Range: 8.5 - 10.5 mg/dL 9.4   AST(SGOT) Latest Ref Range: 12 - 45 U/L 13   ALT(SGPT) Latest Ref Range: 2 - 50 U/L 12   Alkaline Phosphatase Latest Ref Range: 30 - 99 U/L 61   Total Bilirubin Latest Ref Range: 0.1 - 1.5 mg/dL 0.4   Albumin Latest Ref Range: 3.2 - 4.9 g/dL 4.1   Total Protein Latest Ref Range: 6.0 -  8.2 g/dL 7.3   Globulin Latest Ref Range: 1.9 - 3.5 g/dL 3.2   A-G Ratio Latest Units: g/dL 1.3   Glycohemoglobin Latest Ref Range: 0.0 - 5.6 % 7.6 (H)   Estim. Avg Glu Latest Units: mg/dL 171   Cholesterol,Tot Latest Ref Range: 100 - 199 mg/dL 141   Triglycerides Latest Ref Range: 0 - 149 mg/dL 252 (H)   HDL Latest Ref Range: >=40 mg/dL 38 (A)   LDL Latest Ref Range: <100 mg/dL 53   Micro Alb Creat Ratio Latest Ref Range: 0 - 30 mg/g 206 (H)   Creatinine, Urine Latest Units: mg/dL 110.70   Microalbumin, Urine Random Latest Units: mg/dL 22.8       Current medicines (including changes today)  Current Outpatient Prescriptions   Medication Sig Dispense Refill   • CARTIA  MG CAPSULE SR 24 HR TAKE 1 CAPSULE BY MOUTH EVERY DAY 90 Cap 3   • hydroCHLOROthiazide (HYDRODIURIL) 25 MG Tab TAKE 1 TABLET BY MOUTH EVERY DAY 90 Tab 3   • indomethacin (INDOCIN) 50 MG Cap TAKE 1 CAPSULE BY MOUTH THREE TIMES DAILY 90 Cap 5   • lisinopril (PRINIVIL) 20 MG Tab TAKE 1 TABLET BY MOUTH TWICE DAILY 180 Tab 3   • JANUMET  MG per tablet TAKE 1 TABLET TWICE DAILY  WITH MEALS 180 Tab 3   • fluticasone (FLONASE) 50 MCG/ACT nasal spray SHAKE LIQUID AND USE 2 SPRAYS IN EACH NOSTRIL EVERY DAY 3 Bottle 3   • atenolol (TENORMIN) 50 MG Tab Take 1 Tab by mouth 2 times a day. 180 Tab 3   • Blood Glucose Monitoring Suppl Supplies Misc Test strips order: Test strips for One Touch Ultra 2 meter. Sig: use once daily. Dx: E11.40 100 Each 3   • lovastatin (MEVACOR) 20 MG Tab TAKE 3 TABLETS BY MOUTH EVERY  Tab 3   • POTASSIUM PO Take  by mouth.     • Cholecalciferol (VITAMIN D) 2000 UNITS Cap Take 2,000 Units by mouth every day.     • aspirin EC (ECOTRIN) 81 MG Tablet Delayed Response Take 81 mg by mouth every evening.     • Alpha-Lipoic Acid 600 MG CAPS Take 1 Cap by mouth every day.     • cyanocobalamin (VITAMIN B-12) 500 MCG TABS Take 500 mcg by mouth every day.     • docosahexanoic acid (FISH OIL) 1000 MG CAPS Take 1,000 mg by mouth  "every day.       No current facility-administered medications for this visit.      He  has a past medical history of Diabetes; Dyslipidemia; GERD (gastroesophageal reflux disease); Gout; Hypertension; Obesity; Sleep apnea; Tuberculosis; and Vertigo. He also has no past medical history of Encounter for long-term (current) use of other medications.    ROS   No chest pain, no shortness of breath       Objective:     Blood pressure 144/80, pulse 61, temperature (!) 35.6 °C (96 °F), height 1.778 m (5' 10\"), weight 104.8 kg (231 lb), SpO2 98 %. Body mass index is 33.15 kg/m².   Physical Exam:  Constitutional: Alert, no distress.  Skin: Warm, dry, good turgor, no rashes in visible areas.  Eye: Equal, round and reactive, conjunctiva clear, lids normal.  Psych: Alert and oriented x3, normal affect and mood.    Monofilament testing with a 10 gram force: sensation intact: decreased bilaterally  Visual Inspection: Feet without maceration, ulcers, fissures.  Pedal pulses: intact bilaterally  Left lower extremity with trace edema, right lower extremity with no edema.    Assessment and Plan:   The following treatment plan was discussed    1. Type 2 diabetes, controlled, with neuropathy (HCC)  Elevated compared to 6 months ago, but less than 8, which is goal for his age.  Continue current medication.  Advised patient on diet and exercise.  Follow-up in 3 months with labs per his request.  - REFERRAL TO PODIATRY  - COMP METABOLIC PANEL; Future  - HEMOGLOBIN A1C; Future  - MICROALBUMIN CREAT RATIO URINE; Future  - LIPID PROFILE; Future  - Diabetic Monofilament Lower Extremity Exam    2. Essential hypertension  Controlled.  Continue current medications.  - CBC WITH DIFFERENTIAL; Future    3. Dyslipidemia  LDL controlled.  Continue lovastatin.    4. Peripheral neuropathy, idiopathic  Referral to podiatry per patient's request.  - REFERRAL TO PODIATRY    5. Obesity (BMI 30-39.9)  - Patient identified as having weight management issue.  " Appropriate orders and counseling given.      Followup: Return in about 3 months (around 11/20/2018) for Diabetes.

## 2018-08-20 NOTE — ASSESSMENT & PLAN NOTE
A1c has increased over the last 6 months because diet and exercise have been poor recently.  He is currently on Janumet  milligrams twice daily.

## 2018-09-04 ENCOUNTER — OFFICE VISIT (OUTPATIENT)
Dept: MEDICAL GROUP | Facility: MEDICAL CENTER | Age: 78
End: 2018-09-04
Payer: MEDICARE

## 2018-09-04 VITALS
BODY MASS INDEX: 32.78 KG/M2 | HEIGHT: 70 IN | OXYGEN SATURATION: 97 % | HEART RATE: 66 BPM | WEIGHT: 229 LBS | SYSTOLIC BLOOD PRESSURE: 154 MMHG | DIASTOLIC BLOOD PRESSURE: 82 MMHG | TEMPERATURE: 96.9 F

## 2018-09-04 DIAGNOSIS — F41.8 ANXIETY ABOUT HEALTH: ICD-10-CM

## 2018-09-04 DIAGNOSIS — I10 ESSENTIAL HYPERTENSION: ICD-10-CM

## 2018-09-04 PROCEDURE — 99214 OFFICE O/P EST MOD 30 MIN: CPT | Performed by: FAMILY MEDICINE

## 2018-09-04 RX ORDER — ESCITALOPRAM OXALATE 5 MG/1
5 TABLET ORAL DAILY
Qty: 30 TAB | Refills: 1 | Status: SHIPPED | OUTPATIENT
Start: 2018-09-04

## 2018-09-04 NOTE — ASSESSMENT & PLAN NOTE
Per week he has probably a couple episodes of anxiety. He gets anxious about travel and everyday things. Long time ago he was on Librium.

## 2018-09-04 NOTE — PROGRESS NOTES
Subjective:   Primo Ca is a 78 y.o. male here today for HTN    HTN (hypertension)  Patient was traveling to [place] and was undergoing stress during travel. He was feeling tense in his head. Blood pressure has been 145-185/ over the last 1 week. Taking HCTZ 25 mg daily, lisinopril 20 mg twice daily, cartia  mg daily, atenolol 50 mg twice daily. Patient was seen in the ER in [place] because of fatigue and diagnosed with uncontrolled HTN.  He has tried Cartia  mg daily but it has caused fatigue.  Not using his indomethacin regularly.    Anxiety about health  Per week he has probably a couple episodes of anxiety. He gets anxious about travel and everyday things. Long time ago he was on Librium.         Current medicines (including changes today)  Current Outpatient Prescriptions   Medication Sig Dispense Refill   • escitalopram (LEXAPRO) 5 MG tablet Take 1 Tab by mouth every day. 30 Tab 1   • CARTIA  MG CAPSULE SR 24 HR TAKE 1 CAPSULE BY MOUTH EVERY DAY 90 Cap 3   • hydroCHLOROthiazide (HYDRODIURIL) 25 MG Tab TAKE 1 TABLET BY MOUTH EVERY DAY 90 Tab 3   • indomethacin (INDOCIN) 50 MG Cap TAKE 1 CAPSULE BY MOUTH THREE TIMES DAILY 90 Cap 5   • lisinopril (PRINIVIL) 20 MG Tab TAKE 1 TABLET BY MOUTH TWICE DAILY 180 Tab 3   • JANUMET  MG per tablet TAKE 1 TABLET TWICE DAILY  WITH MEALS 180 Tab 3   • fluticasone (FLONASE) 50 MCG/ACT nasal spray SHAKE LIQUID AND USE 2 SPRAYS IN EACH NOSTRIL EVERY DAY 3 Bottle 3   • atenolol (TENORMIN) 50 MG Tab Take 1 Tab by mouth 2 times a day. 180 Tab 3   • Blood Glucose Monitoring Suppl Supplies Misc Test strips order: Test strips for One Touch Ultra 2 meter. Sig: use once daily. Dx: E11.40 100 Each 3   • lovastatin (MEVACOR) 20 MG Tab TAKE 3 TABLETS BY MOUTH EVERY  Tab 3   • POTASSIUM PO Take  by mouth.     • Cholecalciferol (VITAMIN D) 2000 UNITS Cap Take 2,000 Units by mouth every day.     • aspirin EC (ECOTRIN) 81 MG Tablet Delayed  "Response Take 81 mg by mouth every evening.     • Alpha-Lipoic Acid 600 MG CAPS Take 1 Cap by mouth every day.     • cyanocobalamin (VITAMIN B-12) 500 MCG TABS Take 500 mcg by mouth every day.     • docosahexanoic acid (FISH OIL) 1000 MG CAPS Take 1,000 mg by mouth every day.       No current facility-administered medications for this visit.      He  has a past medical history of Diabetes; Dyslipidemia; GERD (gastroesophageal reflux disease); Gout; Hypertension; Obesity; Sleep apnea; Tuberculosis; and Vertigo. He also has no past medical history of Encounter for long-term (current) use of other medications.    ROS   No chest pain, no shortness of breath       Objective:     Blood pressure 154/82, pulse 66, temperature 36.1 °C (96.9 °F), height 1.778 m (5' 10\"), weight 103.9 kg (229 lb), SpO2 97 %. Body mass index is 32.86 kg/m².   Physical Exam:  Constitutional: Alert, no distress.  Skin: Warm, dry, good turgor, no rashes in visible areas.  Eye: Equal, round and reactive, conjunctiva clear, lids normal.  Psych: Alert and oriented x3, normal affect and mood.        Assessment and Plan:   The following treatment plan was discussed    1. Essential hypertension  Uncontrolled on 4 medications. Referral to HTN clinic.  - REFERRAL TO VASCULAR MEDICINE Reason for Referral? Resistant Hypertension    2. Anxiety about health  New diagnosis. Prescription for Lexapro 5 mg daily, follow up in 1-2 months.  - escitalopram (LEXAPRO) 5 MG tablet; Take 1 Tab by mouth every day.  Dispense: 30 Tab; Refill: 1      Followup: Return in about 2 months (around 11/4/2018) for Diabetes, anxiety.         "

## 2018-09-04 NOTE — ASSESSMENT & PLAN NOTE
Patient was traveling to [place] and was undergoing stress during travel. He was feeling tense in his head. Blood pressure has been 145-185/ over the last 1 week. Taking HCTZ 25 mg daily, lisinopril 20 mg twice daily, cartia  mg daily, atenolol 50 mg twice daily. Patient was seen in the ER in [place] because of fatigue and diagnosed with uncontrolled HTN.  He has tried Cartia  mg daily but it has caused fatigue.  Not using his indomethacin regularly.

## 2018-09-10 ENCOUNTER — PATIENT MESSAGE (OUTPATIENT)
Dept: MEDICAL GROUP | Facility: MEDICAL CENTER | Age: 78
End: 2018-09-10

## 2018-09-10 RX ORDER — INDOMETHACIN 50 MG/1
50 CAPSULE ORAL 3 TIMES DAILY
Qty: 90 CAP | Refills: 5 | Status: SHIPPED | OUTPATIENT
Start: 2018-09-10 | End: 2019-09-09 | Stop reason: SDUPTHER

## 2018-09-10 NOTE — TELEPHONE ENCOUNTER
From: Primo Ca  To: Jefferson Garcia M.D.  Sent: 9/10/2018 9:46 AM PDT  Subject: Prescription Question    Since I saw you last Tuesday I am having a gout attack and could use relief with Indomethacine I have. Can I use this while taking Escitalopram you put me on last week ? Also, Bpressure is normalizing , this /76 P52, 9 Pm last night 147/75 P60. Avg for week was 153/80. Also experiencing some symptoms associated with new med, but minor in nature and too soon to assume they will linger on. If they worsen will get back for advice. OK ? Alvarado

## 2018-09-17 ENCOUNTER — PATIENT MESSAGE (OUTPATIENT)
Dept: MEDICAL GROUP | Facility: MEDICAL CENTER | Age: 78
End: 2018-09-17

## 2018-09-20 RX ORDER — LOVASTATIN 20 MG/1
TABLET ORAL
Qty: 270 TAB | Refills: 3 | Status: SHIPPED | OUTPATIENT
Start: 2018-09-20 | End: 2018-10-08

## 2018-09-25 ENCOUNTER — SLEEP CENTER VISIT (OUTPATIENT)
Dept: SLEEP MEDICINE | Facility: MEDICAL CENTER | Age: 78
End: 2018-09-25
Payer: MEDICARE

## 2018-09-25 VITALS
HEART RATE: 55 BPM | HEIGHT: 70 IN | SYSTOLIC BLOOD PRESSURE: 152 MMHG | WEIGHT: 226 LBS | DIASTOLIC BLOOD PRESSURE: 82 MMHG | BODY MASS INDEX: 32.35 KG/M2 | OXYGEN SATURATION: 94 % | RESPIRATION RATE: 15 BRPM

## 2018-09-25 DIAGNOSIS — G47.33 OSA ON CPAP: ICD-10-CM

## 2018-09-25 DIAGNOSIS — I10 ESSENTIAL HYPERTENSION: ICD-10-CM

## 2018-09-25 PROCEDURE — 99213 OFFICE O/P EST LOW 20 MIN: CPT | Performed by: FAMILY MEDICINE

## 2018-09-25 NOTE — PROGRESS NOTES
Subjective:      Primo Ca is a 78 y.o. male who presents with Follow-Up (6 month fv )               [facility] Follow Up Note     Date: 9/25/2018 / Time: 8:36 AM    Patient ID:   Name:             Primo Ca   YOB: 1940  Age:                 78 y.o.  male   MRN:               [id number]      Thank you for requesting a sleep medicine consultation on Primo Ca at the sleep center. He presents today with the chief complaints of ALEX follow up.     HISTORY OF PRESENT ILLNESS:       Pt is currently on CPAP 10 cm. He goes to sleep around 10-11 pm and wakes up around 7-8 am. He is getting about 8 hrs of sleep on a good night and about 6 hr of sleep on a bad night. The bad nights are rare. He is using CPAP most days of the week. Pt reports 8 hrs of average nightly use of CPAP. Pt denies snoring, gasping,choking.Pt also denies significant mask leak that is interfering with sleep.      The 30 day compliance was downloaded which shows adequate compliance with more that 4 hr usage about 97%. The AHI is has improved to 1.7/hr. The mask leak is normal. The symptoms of excessive daytime, snoring and gasping has improved.       PMH:  has a past medical history of Diabetes; Dyslipidemia; GERD (gastroesophageal reflux disease); Gout; Hypertension; Obesity; Sleep apnea; Tuberculosis; and Vertigo. He also has no past medical history of Encounter for long-term (current) use of other medications.  MEDS: Current Outpatient Prescriptions: •  lovastatin (MEVACOR) 20 MG Tab, TAKE 3 TABLETS BY MOUTH EVERY DAY, Disp: 270 Tab, Rfl: 3•  CARTIA  MG CAPSULE SR 24 HR, TAKE 1 CAPSULE BY MOUTH EVERY DAY, Disp: 90 Cap, Rfl: 3•  hydroCHLOROthiazide (HYDRODIURIL) 25 MG Tab, TAKE 1 TABLET BY MOUTH EVERY DAY, Disp: 90 Tab, Rfl: 3•  lisinopril (PRINIVIL) 20 MG Tab, TAKE 1 TABLET BY MOUTH TWICE DAILY, Disp: 180 Tab, Rfl: 3•  JANUMET  MG per tablet, TAKE 1 TABLET TWICE DAILY  WITH  MEALS, Disp: 180 Tab, Rfl: 3•  fluticasone (FLONASE) 50 MCG/ACT nasal spray, SHAKE LIQUID AND USE 2 SPRAYS IN EACH NOSTRIL EVERY DAY, Disp: 3 Bottle, Rfl: 3•  atenolol (TENORMIN) 50 MG Tab, Take 1 Tab by mouth 2 times a day., Disp: 180 Tab, Rfl: 3•  POTASSIUM PO, Take  by mouth., Disp: , Rfl: •  Cholecalciferol (VITAMIN D) 2000 UNITS Cap, Take 2,000 Units by mouth every day., Disp: , Rfl: •  aspirin EC (ECOTRIN) 81 MG Tablet Delayed Response, Take 81 mg by mouth every evening., Disp: , Rfl: •  cyanocobalamin (VITAMIN B-12) 500 MCG TABS, Take 500 mcg by mouth every day., Disp: , Rfl: •  docosahexanoic acid (FISH OIL) 1000 MG CAPS, Take 1,000 mg by mouth every day., Disp: , Rfl: •  indomethacin (INDOCIN) 50 MG Cap, Take 1 Cap by mouth 3 times a day., Disp: 90 Cap, Rfl: 5•  escitalopram (LEXAPRO) 5 MG tablet, Take 1 Tab by mouth every day., Disp: 30 Tab, Rfl: 1•  Blood Glucose Monitoring Suppl Supplies Misc, Test strips order: Test strips for One Touch Ultra 2 meter. Sig: use once daily. Dx: E11.40, Disp: 100 Each, Rfl: 3•  Alpha-Lipoic Acid 600 MG CAPS, Take 1 Cap by mouth every day., Disp: , Rfl:   ALLERGIES:  -- Spironolactone     --  diarrhea  SURGHX: Past Surgical History:  No date: CHOLECYSTECTOMY  SOCHX:  reports that he quit smoking about 37 years ago. His smoking use included Pipe and Cigars. He started smoking about 43 years ago. He quit after 22.00 years of use. He has never used smokeless tobacco. He reports that he does not drink alcohol or use drugs..  FH: Review of patient's family history indicates:  Problem: Stroke      Relation: Mother       Age of Onset: (Not Specified)   Problem: Hypertension      Relation: Mother       Age of Onset: (Not Specified)   Problem: Cancer      Relation: Father       Age of Onset: (Not Specified)       Comment: colon cancer;liver  Problem: Stroke      Relation: Brother       Age of Onset: (Not Specified)   Problem: Hypertension      Relation: Brother       Age of Onset:  "(Not Specified)                           ROS  Constitutional: Denies fevers, Denies weight changes  Eyes: Denies changes in vision, no eye pain  Ears/Nose/Throat/Mouth: Denies nasal congestion or sore throat   Cardiovascular: Denies chest pain or palpitations   Respiratory: Denies shortness of breath , Denies cough  Gastrointestinal/Hepatic: Denies abdominal pain, nausea, vomiting, diarrhea, constipation or GI bleeding   Genitourinary: Denies bladder dysfunction, dysuria or frequency  Skin/Breast: Denies rash,   Neurological: Denies headache, confusion, memory loss or focal weakness/parasthesias  Sleep: + EDS and snoring      Objective:     /82 (BP Location: Right arm, Patient Position: Sitting, BP Cuff Size: Adult)   Pulse (!) 55   Resp 15   Ht 1.778 m (5' 10\")   Wt 102.5 kg (226 lb)   SpO2 94%   BMI 32.43 kg/m²      Physical Exam            1. Head: Atraumatic, normocephalic.   2. Ears: Normal tympanic membrane and no discharge  3. Nose: No inferior turbinate hypertophy, no septal deviation, no polyp.   4. Throat: Oropharynx appears crowded in that the palate is overhanging   5. Neck: Supple. No thyromegaly  6. Chest: Trachea central, no spine deformity   7. Lungs auscultation: B/L good air entry, vesicular breath sounds, no adventitious sounds  8. Heart auscultation: 1st and 2nd heart sounds normal, regular rhythm. No appreciable murmur.  9.  Extremities: no clubbing, no pedal edema.  10. Skin: No rash  11. NEUROLOGICAL EXAMINATION: On neurological exam, the patient was alert and oriented x3. speech was clear and fluent without dysarthria.  Assessment/Plan:         ASSESSMENT AND PLAN     1. Sleep Apnea (ALEX).      The pathophysiology of sleep anea and the increased risk of cardiovascular morbidity from untreated sleep apnea is discussed in detail with the patient.    He is urged to avoid supine sleep, weight gain and alcoholic beverages since all of these can worsen sleep apnea. He is cautioned " against drowsy driving. If He feels sleepy while driving, He must pull over for a break/nap, rather than persist on the road, in the interest of He own safety and that of others on the road.   Plan   - Continue CPAP at 10 cm with FFM    - compliance download was reviewed and discussed with the pt   - compliance was reinforced     2.. Regarding treatment of other past medical problems and general health maintenance,  He is urged to follow up with PCP.    Pt was counseled on flu vaccine. Pt deffered flu vaccine. Pt will be getting vaccine through PCP

## 2018-09-26 ENCOUNTER — APPOINTMENT (RX ONLY)
Dept: URBAN - METROPOLITAN AREA CLINIC 4 | Facility: CLINIC | Age: 78
Setting detail: DERMATOLOGY
End: 2018-09-26

## 2018-09-26 DIAGNOSIS — Z85.828 PERSONAL HISTORY OF OTHER MALIGNANT NEOPLASM OF SKIN: ICD-10-CM

## 2018-09-26 DIAGNOSIS — L82.0 INFLAMED SEBORRHEIC KERATOSIS: ICD-10-CM

## 2018-09-26 DIAGNOSIS — L82.1 OTHER SEBORRHEIC KERATOSIS: ICD-10-CM

## 2018-09-26 DIAGNOSIS — D22 MELANOCYTIC NEVI: ICD-10-CM

## 2018-09-26 DIAGNOSIS — L57.0 ACTINIC KERATOSIS: ICD-10-CM

## 2018-09-26 DIAGNOSIS — D18.0 HEMANGIOMA: ICD-10-CM

## 2018-09-26 DIAGNOSIS — D485 NEOPLASM OF UNCERTAIN BEHAVIOR OF SKIN: ICD-10-CM

## 2018-09-26 DIAGNOSIS — L81.4 OTHER MELANIN HYPERPIGMENTATION: ICD-10-CM

## 2018-09-26 DIAGNOSIS — L57.8 OTHER SKIN CHANGES DUE TO CHRONIC EXPOSURE TO NONIONIZING RADIATION: ICD-10-CM

## 2018-09-26 PROBLEM — D22.5 MELANOCYTIC NEVI OF TRUNK: Status: ACTIVE | Noted: 2018-09-26

## 2018-09-26 PROBLEM — D18.01 HEMANGIOMA OF SKIN AND SUBCUTANEOUS TISSUE: Status: ACTIVE | Noted: 2018-09-26

## 2018-09-26 PROBLEM — D48.5 NEOPLASM OF UNCERTAIN BEHAVIOR OF SKIN: Status: ACTIVE | Noted: 2018-09-26

## 2018-09-26 PROCEDURE — 11100: CPT | Mod: 59

## 2018-09-26 PROCEDURE — 99213 OFFICE O/P EST LOW 20 MIN: CPT | Mod: 25

## 2018-09-26 PROCEDURE — ? COUNSELING

## 2018-09-26 PROCEDURE — 11101: CPT

## 2018-09-26 PROCEDURE — 17004 DESTROY PREMAL LESIONS 15/>: CPT

## 2018-09-26 PROCEDURE — ? BIOPSY BY SHAVE METHOD

## 2018-09-26 PROCEDURE — ? PRESCRIPTION

## 2018-09-26 PROCEDURE — 17110 DESTRUCTION B9 LES UP TO 14: CPT | Mod: 59

## 2018-09-26 PROCEDURE — ? LIQUID NITROGEN

## 2018-09-26 RX ORDER — INGENOL MEBUTATE 150 UG/G
GEL TOPICAL
Qty: 1 | Refills: 3 | Status: ERX | COMMUNITY
Start: 2018-09-26

## 2018-09-26 RX ADMIN — INGENOL MEBUTATE: 150 GEL TOPICAL at 17:09

## 2018-09-26 ASSESSMENT — LOCATION DETAILED DESCRIPTION DERM
LOCATION DETAILED: RIGHT ULNAR DORSAL HAND
LOCATION DETAILED: LEFT DISTAL DORSAL FOREARM
LOCATION DETAILED: RIGHT FOREHEAD
LOCATION DETAILED: RIGHT INFERIOR ANTERIOR NECK
LOCATION DETAILED: RIGHT PROXIMAL RADIAL DORSAL FOREARM
LOCATION DETAILED: RIGHT MID TEMPLE
LOCATION DETAILED: RIGHT INFERIOR MEDIAL FOREHEAD
LOCATION DETAILED: RIGHT SUPERIOR FOREHEAD
LOCATION DETAILED: LEFT LATERAL FOREHEAD
LOCATION DETAILED: RIGHT SUPERIOR MEDIAL FOREHEAD
LOCATION DETAILED: LEFT PROXIMAL ULNAR DORSAL FOREARM
LOCATION DETAILED: LEFT PROXIMAL RADIAL DORSAL FOREARM
LOCATION DETAILED: RIGHT DISTAL LATERAL POSTERIOR UPPER ARM
LOCATION DETAILED: RIGHT MEDIAL DISTAL PRETIBIAL REGION
LOCATION DETAILED: RIGHT SUPERIOR UPPER BACK
LOCATION DETAILED: RIGHT CLAVICULAR SKIN
LOCATION DETAILED: RIGHT DISTAL DORSAL FOREARM
LOCATION DETAILED: RIGHT SUPERIOR HELIX
LOCATION DETAILED: LEFT DISTAL ULNAR DORSAL FOREARM
LOCATION DETAILED: RIGHT DORSAL WRIST
LOCATION DETAILED: LEFT ULNAR DORSAL HAND
LOCATION DETAILED: LEFT PROXIMAL DORSAL FOREARM
LOCATION DETAILED: LEFT SUPERIOR HELIX
LOCATION DETAILED: LEFT INFERIOR FOREHEAD
LOCATION DETAILED: RIGHT RADIAL DORSAL HAND
LOCATION DETAILED: LEFT SUPERIOR UPPER BACK
LOCATION DETAILED: RIGHT INFERIOR MEDIAL UPPER BACK
LOCATION DETAILED: LEFT MEDIAL FRONTAL SCALP
LOCATION DETAILED: LEFT DORSAL INDEX METACARPOPHALANGEAL JOINT
LOCATION DETAILED: RIGHT INFERIOR CENTRAL MALAR CHEEK
LOCATION DETAILED: RIGHT PROXIMAL DORSAL FOREARM
LOCATION DETAILED: LEFT RADIAL DORSAL HAND
LOCATION DETAILED: RIGHT INFERIOR LATERAL FOREHEAD

## 2018-09-26 ASSESSMENT — LOCATION ZONE DERM
LOCATION ZONE: SCALP
LOCATION ZONE: EAR
LOCATION ZONE: TRUNK
LOCATION ZONE: FACE
LOCATION ZONE: LEG
LOCATION ZONE: HAND
LOCATION ZONE: ARM
LOCATION ZONE: NECK

## 2018-09-26 ASSESSMENT — LOCATION SIMPLE DESCRIPTION DERM
LOCATION SIMPLE: RIGHT EAR
LOCATION SIMPLE: RIGHT PRETIBIAL REGION
LOCATION SIMPLE: RIGHT CLAVICULAR SKIN
LOCATION SIMPLE: LEFT FOREHEAD
LOCATION SIMPLE: RIGHT TEMPLE
LOCATION SIMPLE: LEFT EAR
LOCATION SIMPLE: RIGHT FOREHEAD
LOCATION SIMPLE: RIGHT UPPER ARM
LOCATION SIMPLE: LEFT HAND
LOCATION SIMPLE: RIGHT FOREARM
LOCATION SIMPLE: RIGHT HAND
LOCATION SIMPLE: LEFT SCALP
LOCATION SIMPLE: RIGHT UPPER BACK
LOCATION SIMPLE: RIGHT CHEEK
LOCATION SIMPLE: RIGHT WRIST
LOCATION SIMPLE: RIGHT ANTERIOR NECK
LOCATION SIMPLE: LEFT FOREARM
LOCATION SIMPLE: LEFT UPPER BACK

## 2018-09-26 NOTE — PROCEDURE: LIQUID NITROGEN
Duration Of Freeze Thaw-Cycle (Seconds): 3
Render Post-Care Instructions In Note?: no
Detail Level: Simple
Number Of Freeze-Thaw Cycles: 2 freeze-thaw cycles
Aperture Size (Optional): C
Post-Care Instructions: I reviewed with the patient in detail post-care instructions. Patient is to wear sunprotection, and avoid picking at any of the treated lesions. Pt may apply Vaseline to crusted or scabbing areas.
Consent: The patient's consent was obtained including but not limited to risks of crusting, scabbing, blistering, scarring, darker or lighter pigmentary change, recurrence, incomplete removal and infection.
Number Of Freeze-Thaw Cycles: 1 freeze-thaw cycle
Medical Necessity Information: It is in your best interest to select a reason for this procedure from the list below. All of these items fulfill various CMS LCD requirements except the new and changing color options.
Detail Level: Detailed
Medical Necessity Clause: This procedure was medically necessary because the lesions that were treated were:

## 2018-09-26 NOTE — PROCEDURE: BIOPSY BY SHAVE METHOD
Wound Care: Bacitracin
Lab Facility: 
Was A Bandage Applied: Yes
Destruction After The Procedure: No
Notification Instructions: Patient will be notified of biopsy results. However, patient instructed to call the office if not contacted within 2 weeks.
Lab: [id number]
Additional Anesthesia Volume In Cc (Will Not Render If 0): 0
Depth Of Biopsy: dermis
Silver Nitrate Text: The wound bed was treated with silver nitrate after the biopsy was performed.
Detail Level: Detailed
Electrodesiccation And Curettage Text: The wound bed was treated with electrodesiccation and curettage after the biopsy was performed.
Electrodesiccation Text: The wound bed was treated with electrodesiccation after the biopsy was performed.
Biopsy Method: Dermablade
Dressing: bandage
Anesthesia Type: 1% lidocaine with epinephrine
Billing Type: Third-Party Bill
Post-Care Instructions: I reviewed with the patient in detail post-care instructions. Patient is to keep the biopsy site dry overnight, and then apply bacitracin twice daily until healed. Patient may apply hydrogen peroxide soaks to remove any crusting.
Curettage Text: The wound bed was treated with curettage after the biopsy was performed.
Biopsy Type: H and E
Anesthesia Volume In Cc: 0.5
Hemostasis: Drysol
Body Location Override (Optional - Billing Will Still Be Based On Selected Body Map Location If Applicable): right superior medial pretibial region
Consent: Written consent was obtained and risks were reviewed including but not limited to scarring, infection, bleeding, scabbing, incomplete removal, nerve damage and allergy to anesthesia.
Type Of Destruction Used: Electrodesiccation
Cryotherapy Text: The wound bed was treated with cryotherapy after the biopsy was performed.
Body Location Override (Optional - Billing Will Still Be Based On Selected Body Map Location If Applicable): right inferior medial pretibial region

## 2018-09-27 ENCOUNTER — TELEPHONE (OUTPATIENT)
Dept: MEDICAL GROUP | Facility: MEDICAL CENTER | Age: 78
End: 2018-09-27

## 2018-09-27 ENCOUNTER — NON-PROVIDER VISIT (OUTPATIENT)
Dept: MEDICAL GROUP | Facility: MEDICAL CENTER | Age: 78
End: 2018-09-27
Payer: MEDICARE

## 2018-09-27 NOTE — NON-PROVIDER
Primo Ca is a 78 y.o. male here for a non-provider visit for BP Check 190/100    There were no vitals filed for this visit.  If abnormal, was an in office provider notified today? Yes  Routed to PCP? Yes

## 2018-09-27 NOTE — TELEPHONE ENCOUNTER
Patient has associated anxiety today in clinic, home readings have not been as high as what we recorded in clinic today (see scanned in BP log).  He has an appointment with blood pressure specialist October 8.    Dr. Garcia

## 2018-10-08 ENCOUNTER — OFFICE VISIT (OUTPATIENT)
Dept: VASCULAR LAB | Facility: MEDICAL CENTER | Age: 78
End: 2018-10-08
Attending: INTERNAL MEDICINE
Payer: MEDICARE

## 2018-10-08 VITALS
HEART RATE: 71 BPM | BODY MASS INDEX: 32.64 KG/M2 | DIASTOLIC BLOOD PRESSURE: 80 MMHG | WEIGHT: 228 LBS | HEIGHT: 70 IN | SYSTOLIC BLOOD PRESSURE: 182 MMHG

## 2018-10-08 DIAGNOSIS — E78.5 DYSLIPIDEMIA: ICD-10-CM

## 2018-10-08 DIAGNOSIS — I10 ESSENTIAL HYPERTENSION: ICD-10-CM

## 2018-10-08 DIAGNOSIS — R60.0 LEG EDEMA: ICD-10-CM

## 2018-10-08 DIAGNOSIS — I50.810 RIGHT-SIDED HEART FAILURE, UNSPECIFIED HF CHRONICITY (HCC): ICD-10-CM

## 2018-10-08 PROCEDURE — 99212 OFFICE O/P EST SF 10 MIN: CPT | Performed by: INTERNAL MEDICINE

## 2018-10-08 PROCEDURE — 99204 OFFICE O/P NEW MOD 45 MIN: CPT | Performed by: INTERNAL MEDICINE

## 2018-10-08 RX ORDER — HYDROCHLOROTHIAZIDE 25 MG/1
12.5 TABLET ORAL DAILY
Qty: 90 TAB | Refills: 3
Start: 2018-10-08 | End: 2019-04-11

## 2018-10-08 RX ORDER — PRAVASTATIN SODIUM 40 MG
40 TABLET ORAL DAILY
Qty: 90 TAB | Refills: 3 | Status: SHIPPED | OUTPATIENT
Start: 2018-10-08 | End: 2019-07-15 | Stop reason: SDUPTHER

## 2018-10-08 RX ORDER — EPLERENONE 50 MG/1
50 TABLET, FILM COATED ORAL DAILY
Qty: 90 TAB | Refills: 3 | Status: SHIPPED | OUTPATIENT
Start: 2018-10-08 | End: 2019-04-11

## 2018-10-08 RX ORDER — DOXAZOSIN 2 MG/1
2 TABLET ORAL DAILY
Qty: 90 TAB | Refills: 3 | Status: SHIPPED | OUTPATIENT
Start: 2018-10-08 | End: 2019-07-15 | Stop reason: SDUPTHER

## 2018-10-08 RX ORDER — LISINOPRIL 40 MG/1
40 TABLET ORAL DAILY
Qty: 90 TAB | Refills: 3 | Status: SHIPPED | OUTPATIENT
Start: 2018-10-08 | End: 2019-07-15 | Stop reason: SDUPTHER

## 2018-10-08 NOTE — PROGRESS NOTES
Resistant hypertension Clinic - Initial Visit  Oct 8, 2018    Assessment / Plan:   1. Blood Pressure Control:  Acc/aha bp goal <130/80  Long standing resistant htn  Does have some degree of white coat effect, but home BPs still well above goal  Not great candidate for TRIO study as baseline leg swelling precludes amlodipine, but would be willing to consider CALM study if his BP remains poorly controlled next visit    2. Work up of Secondary Causes of Resistant Hypertension:   Renovascular HTN: Not Yet Assessed  Primary Aldosteronism: Not Yet Assessed  Thyroid Function: Not Yet Assessed  Obstructive Sleep Apnea: Present - continue cpap  Pheochromocytoma: Not Yet Assessed  Intrinsic renal disease - not yet assessed  Recommendations At This Visit  - check renal artery duplex  - check pra, rebeca, plasma mets, tsh, urinalysys    3. Medication Use / Adherence:  Assessment: Complete  Recommendations: Instructed to Continue Taking All Medications As Prescribed         4. End Organ Damage:   Left Ventricular Hypertrophy: Not Assessed - check echo  Albuminuria: Microalbuminuria (A1) in 2018  Renal Function: GFR >60  Established Cardiovascular Disease: possible right (or left) HF given leg swelling - check echo    5. Lifestyle Recommendations:  - decrease sodium  - decrease nsaids  - decrease simple sugars  - continue slow steady weight loss    6. Standard HTN Pharmacotherapy:  ACEI/ARB: continue lisinopril, but change to 40 mg daily to decrease pill count  Thiazide Type Diuretic: decrease hctz to 12.5 mg daily given hypok and gout  Calcium Channel Blocker (CCB): continue cartia at current dose; avoid dhp given leg swellling    7. Additional Agents:  Aldosterone Antagonist: ? allergy to spironolactone; try eplerenone 50 mg daily - hold K supplement   Beta blocker - taper off atenolol per my instructoins  Peripheral alpha blocker - start doxazosin 2 mg each night with extra as needed for 'surges' - increase as needed or  tolerated     8. Other CV Risk Factors:   Lipids - potential interaction with diltiazem and lovastatin; change to prava 40 mg daily; may need nmr in future  DM - continue janumet for now; consider addition of sglt-2 antagonist if a1c remains >7  Antiplatelet - continue low dose asa given ascvd risk    9. Other Issues:  Gout - decrease hctz as above; limit nsaids; otherwise defer management to pcp  ALEX - continue cpap    Studies Ordered At This Visit: echo and renal artery duplex  Blood Work to Be Obtained Prior to Next Visit: per below prior to next visit  Disposition: RTC in 6 weeks    Diagnosis:  1. Dyslipidemia  COMP METABOLIC PANEL   2. Essential hypertension  EC-ECHOCARDIOGRAM COMPLETE W/O CONT    US-RENAL ARTERY DUPLEX COMP    TSH    URINALYSIS    METANEPHRINES PLASMA    ALDOSTERONE    RENIN ACTIVITY    BTYPE NATRIURETIC PEPTIDE    COMP METABOLIC PANEL   3. Right-sided heart failure, unspecified HF chronicity (HCC)  EC-ECHOCARDIOGRAM COMPLETE W/O CONT    BTYPE NATRIURETIC PEPTIDE    COMP METABOLIC PANEL   4. Leg edema  COMP METABOLIC PANEL        History of Present Illness:   Primo Ca is a 78 y.o. male who was seen 10/8/2018 for evaluation of resistant HTN and management. Primo Ca is referred by: No ref. provider found.    Key HTN History:  Age at diagnosis: 35  Symptoms of High Blood Pressure: None Currently  Prior Medications Used for Hypertension Treatment:   Atenolol for a long time.  Other meds for unclear how long  Does not recall taking amlodipine  Didn't feel well on higher doses of cartia, but does tolerate this dose  Exacerbating Medications or Agents: indomethecin for gout  Current Adherence to BP Medications: Complete  Number of HTN crisis / hospitalizations in last year: 0  Home BP Monitoring Results: 140s-150s/70s-80s - spikes at times  24 Hour Ambulatory BP results: Not Performed   No heart or vascular disease  No problems with kidneys  On lovastatin for cholesterol  On  janumet for diabetes  FS usually around 160s  On asa  Does have stress and anxiety - raises BP a bit  ALEX - on cpap feels great  Does have h/o Gout - fairly infrequent   Spironolactone listed as an allergy, but patient does not recall taking it.  Looks like low dose amlodipine was tried at one point, but patient does not recall taking it.     Problem List:     Patient Active Problem List    Diagnosis Date Noted   • Anxiety about health 03/06/2018   • Obesity (BMI 30-39.9) 09/13/2017   • Elevated PSA, less than 10 ng/ml 03/24/2017   • Decreased hearing 03/24/2017   • Absolute anemia 03/15/2016   • Primary osteoarthritis of both wrists 03/01/2016   • Cervical radiculopathy 10/08/2015   • Peripheral neuropathy, idiopathic 10/08/2015   • ALEX on CPAP 04/29/2015   • BPH (benign prostatic hyperplasia) 04/02/2015   • Neck pain on left side 10/30/2014   • Diverticulosis of colon 08/22/2014   • Diabetic neuropathy, type II diabetes mellitus (HCC) 03/20/2014   • Varicose veins of legs 03/20/2014   • Actinic keratosis 03/20/2014   • History of squamous cell carcinoma of skin 03/20/2014   • Cerebral microvascular disease 03/04/2014   • Seasonal allergies 02/10/2014   • Leg pain 01/16/2013   • Type 2 diabetes, controlled, with neuropathy (HCC) 10/19/2009   • HTN (hypertension) 10/19/2009   • Dyslipidemia 10/19/2009   • Chronic gout 10/19/2009   • GERD (gastroesophageal reflux disease) 10/19/2009        Surgical History:     Past Surgical History:   Procedure Laterality Date   • CHOLECYSTECTOMY          Social History:     Social History     Social History   • Marital status:      Spouse name: Evelyn   • Number of children: N/A   • Years of education: N/A     Social History Main Topics   • Smoking status: Former Smoker     Years: 22.00     Types: Pipe, Cigars     Start date: 4/1/1975     Quit date: 8/2/1981   • Smokeless tobacco: Never Used   • Alcohol use No   • Drug use: No   • Sexual activity: No      Comment:  ", 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, retired government official     Other Topics Concern   • Not on file     Social History Narrative   • No narrative on file        Family History:     Family History   Problem Relation Age of Onset   • Stroke Mother    • Hypertension Mother    • Cancer Father         colon cancer;liver   • Stroke Brother    • Hypertension Brother    Mother - stroke at age 80     Review of Systems:   ROS - as per my intake form which I reviewed with him and signed     Objective:   Allergies:  Spironolactone    Vitals:    10/08/18 1116 10/08/18 1122   BP: (!) 207/86 (!) 182/80   BP Location: Left arm Left arm   Patient Position: Sitting Sitting   BP Cuff Size: Adult Adult   Pulse: 71 71   Weight: 103.4 kg (228 lb)    Height: 1.778 m (5' 10\")      Body mass index is 32.71 kg/m².    Outpatient Encounter Prescriptions as of 10/8/2018   Medication Sig Dispense Refill   • lisinopril (PRINIVIL, ZESTRIL) 40 MG tablet Take 1 Tab by mouth every day. 90 Tab 3   • hydroCHLOROthiazide (HYDRODIURIL) 25 MG Tab Take 0.5 Tabs by mouth every day. 90 Tab 3   • pravastatin (PRAVACHOL) 40 MG tablet Take 1 Tab by mouth every day. 90 Tab 3   • Eplerenone 50 MG Tab Take 1 Tab by mouth every day. 90 Tab 3   • doxazosin (CARDURA) 2 MG Tab Take 1 Tab by mouth every day. 90 Tab 3   • indomethacin (INDOCIN) 50 MG Cap Take 1 Cap by mouth 3 times a day. 90 Cap 5   • Alpha-Lipoic Acid 600 MG CAPS Take 1 Cap by mouth every day.     • [DISCONTINUED] lovastatin (MEVACOR) 20 MG Tab TAKE 3 TABLETS BY MOUTH EVERY  Tab 3   • escitalopram (LEXAPRO) 5 MG tablet Take 1 Tab by mouth every day. 30 Tab 1   • CARTIA  MG CAPSULE SR 24 HR TAKE 1 CAPSULE BY MOUTH EVERY DAY 90 Cap 3   • [DISCONTINUED] hydroCHLOROthiazide (HYDRODIURIL) 25 MG Tab TAKE 1 TABLET BY MOUTH EVERY DAY 90 Tab 3   • JANUMET  MG per tablet TAKE 1 TABLET TWICE DAILY  WITH MEALS 180 Tab 3   • [DISCONTINUED] lisinopril (PRINIVIL) 20 MG Tab TAKE 1 TABLET BY MOUTH TWICE DAILY " 180 Tab 3   • fluticasone (FLONASE) 50 MCG/ACT nasal spray SHAKE LIQUID AND USE 2 SPRAYS IN EACH NOSTRIL EVERY DAY 3 Bottle 3   • [DISCONTINUED] atenolol (TENORMIN) 50 MG Tab Take 1 Tab by mouth 2 times a day. 180 Tab 3   • Blood Glucose Monitoring Suppl Supplies Misc Test strips order: Test strips for One Touch Ultra 2 meter. Sig: use once daily. Dx: E11.40 100 Each 3   • [DISCONTINUED] POTASSIUM PO Take  by mouth.     • Cholecalciferol (VITAMIN D) 2000 UNITS Cap Take 2,000 Units by mouth every day.     • aspirin EC (ECOTRIN) 81 MG Tablet Delayed Response Take 81 mg by mouth every evening.     • cyanocobalamin (VITAMIN B-12) 500 MCG TABS Take 500 mcg by mouth every day.     • docosahexanoic acid (FISH OIL) 1000 MG CAPS Take 1,000 mg by mouth every day.       No facility-administered encounter medications on file as of 10/8/2018.      Physical Exam   Constitutional: He is oriented to person, place, and time. He appears well-developed and well-nourished. No distress.   HENT:   Head: Normocephalic and atraumatic.   Eyes: Pupils are equal, round, and reactive to light. Conjunctivae and EOM are normal. No scleral icterus.   Neck: Normal range of motion. Neck supple. No JVD present. No thyromegaly present.   Cardiovascular: Normal rate, regular rhythm, normal heart sounds and intact distal pulses.  Exam reveals no gallop and no friction rub.    No murmur heard.  Pulmonary/Chest: Breath sounds normal. No respiratory distress. He has no wheezes. He has no rales.   Abdominal: Soft. Bowel sounds are normal. He exhibits no distension and no mass. There is no tenderness. There is no rebound.   Musculoskeletal: Normal range of motion. He exhibits edema. He exhibits no tenderness.   1+ edema bilat   Neurological: He is alert and oriented to person, place, and time. No cranial nerve deficit. Coordination normal.   Skin: Skin is warm and dry. No rash noted. He is not diaphoretic.   Psychiatric: He has a normal mood and affect. His  behavior is normal.   Vitals reviewed.       Accessory Clinical Findings:   Echocardiogram:  Results for orders placed or performed during the hospital encounter of 14   ECHOCARDIOGRAM COMP W/O CONT   Result Value Ref Range    Eject.Frac. MOD BP 72.03     Eject.Frac. MOD 4C 71.06     Eject.Frac. MOD 2C 60.9       Lab Results   Component Value Date    CHOLSTRLTOT 141 2018    LDL 53 2018    HDL 38 (A) 2018    TRIGLYCERIDE 252 (H) 2018      Lab Results   Component Value Date    PROTHROMBTM 12.2 2017    INR 0.92 2017       Lab Results   Component Value Date    HBA1C 7.6 (H) 2018      Lab Results   Component Value Date    SODIUM 139 2018    POTASSIUM 3.9 2018    CHLORIDE 101 2018    CO2 30 2018    GLUCOSE 146 (H) 2018    BUN 16 2018    CREATININE 1.13 2018        Lab Results   Component Value Date    URCREAT 110.70 2018    MICROALBUR 22.8 2018    MALBCRT 206 (H) 2018     Lab Results   Component Value Date    STMTRPT  2017     [facility] Emergency Dept.    Test Date:  2017  Pt Name:    MERCEDEZ MCDONALD               Department: [facility]  MRN:        [id number]                      [facility]  Gender:     M                            Technician: GAEL  :        [id number]                   Requested By:PIPE SALAS  Order #:    [id number]                    Reading MD: Pipe Salas MD    Measurements  Intervals                                Axis  Rate:       78                           P:          32  WA:         208                          QRS:        13  QRSD:       118                          T:          -12  QT:         420  QTc:        479    Interpretive Statements  SINUS RHYTHM  INCOMPLETE RIGHT BUNDLE BRANCH BLOCK  1st deg AV block  no stemi, strain, or dysrhythmia    Compared to ECG 2017 15:41:20  No significant change  Electronically Signed On  9-9-2017 18:48:30 PDT by Graham Ingalsbe, MD Michael J Bloch, M.D.     Cc:  CHAD Garcia

## 2018-10-08 NOTE — PATIENT INSTRUCTIONS
Please scheduling to arrange for   - echocardiogram  - kidney artery ultrasound    Additional blood work in november as attached.    Decrease salt intake  Decrease indomethecin use if possible.    CHOLESTEROL:  - stop lovastatin  - start pravastatin 40 mg once daily    DIABETES:  - continue with janumet for now    BLOOD PRESSURE  - taper off atenolol - go to 50 mg once daily in am for 3 weeks.  Then 1/2 tab daily for 3 weeks, then stop  - change lisinopril to 40 mg (new prescription) once daily  - continue cartia XT at current dose  - decrease hydrochlorothiazide to 1/2 pill daily  - START eplerenone 50 mg daily   - START doxazosin 2 mg each night - can take extra 1/2 tab as needed for high BP    STOP extra Potassium    Follow Up:  Dec 13 at 1120    Michael Bloch, MD  [facility]  [phone]

## 2018-10-22 DIAGNOSIS — E11.40 TYPE 2 DIABETES, CONTROLLED, WITH NEUROPATHY (HCC): ICD-10-CM

## 2018-11-14 ENCOUNTER — APPOINTMENT (RX ONLY)
Dept: URBAN - METROPOLITAN AREA CLINIC 4 | Facility: CLINIC | Age: 78
Setting detail: DERMATOLOGY
End: 2018-11-14

## 2018-11-14 DIAGNOSIS — L56.5 DISSEMINATED SUPERFICIAL ACTINIC POROKERATOSIS (DSAP): ICD-10-CM

## 2018-11-14 PROBLEM — D04.71 CARCINOMA IN SITU OF SKIN OF RIGHT LOWER LIMB, INCLUDING HIP: Status: ACTIVE | Noted: 2018-11-14

## 2018-11-14 PROCEDURE — ? ADDITIONAL NOTES

## 2018-11-14 PROCEDURE — ? EXCISION

## 2018-11-14 PROCEDURE — 99212 OFFICE O/P EST SF 10 MIN: CPT | Mod: 25

## 2018-11-14 PROCEDURE — 11603 EXC TR-EXT MAL+MARG 2.1-3 CM: CPT

## 2018-11-14 ASSESSMENT — LOCATION SIMPLE DESCRIPTION DERM: LOCATION SIMPLE: RIGHT PRETIBIAL REGION

## 2018-11-14 ASSESSMENT — LOCATION DETAILED DESCRIPTION DERM: LOCATION DETAILED: RIGHT MEDIAL DISTAL PRETIBIAL REGION

## 2018-11-14 ASSESSMENT — LOCATION ZONE DERM: LOCATION ZONE: LEG

## 2018-11-14 NOTE — PROCEDURE: EXCISION
Banner Transposition Flap Text: The defect edges were debeveled with a #15 scalpel blade.  Given the location of the defect and the proximity to free margins a Banner transposition flap was deemed most appropriate.  Using a sterile surgical marker, an appropriate flap drawn around the defect. The area thus outlined was incised deep to adipose tissue with a #15 scalpel blade.  The skin margins were undermined to an appropriate distance in all directions utilizing iris scissors.
Routed to surgery schedulers to review. I do not believe there are any sooner openings for routine screening procedures.  Held for urgent cases
Fusiform Excision Additional Text (Leave Blank If You Do Not Want): The margin was drawn around the clinically apparent lesion.  A fusiform shape was then drawn on the skin incorporating the lesion and margins.  Incisions were then made along these lines to the appropriate tissue plane and the lesion was extirpated.
Show Repair Anesthesia Variables: Yes
Cheek-To-Nose Interpolation Flap Text: A decision was made to reconstruct the defect utilizing an interpolation axial flap and a staged reconstruction.  A telfa template was made of the defect.  This telfa template was then used to outline the Cheek-To-Nose Interpolation flap.  The donor area for the pedicle flap was then injected with anesthesia.  The flap was excised through the skin and subcutaneous tissue down to the layer of the underlying musculature.  The interpolation flap was carefully excised within this deep plane to maintain its blood supply.  The edges of the donor site were undermined.   The donor site was closed in a primary fashion.  The pedicle was then rotated into position and sutured.  Once the tube was sutured into place, adequate blood supply was confirmed with blanching and refill.  The pedicle was then wrapped with xeroform gauze and dressed appropriately with a telfa and gauze bandage to ensure continued blood supply and protect the attached pedicle.
O-T Advancement Flap Text: The defect edges were debeveled with a #15 scalpel blade.  Given the location of the defect, shape of the defect and the proximity to free margins an O-T advancement flap was deemed most appropriate.  Using a sterile surgical marker, an appropriate advancement flap was drawn incorporating the defect and placing the expected incisions within the relaxed skin tension lines where possible.    The area thus outlined was incised deep to adipose tissue with a #15 scalpel blade.  The skin margins were undermined to an appropriate distance in all directions utilizing iris scissors.
Partial Purse String (Simple) Text: Given the location of the defect and the characteristics of the surrounding skin a simple purse string closure was deemed most appropriate.  Undermining was performed circumferentially around the surgical defect.  A purse string suture was then placed and tightened. Wound tension of the circular defect prevented complete closure of the wound.
Interpolation Flap Text: A decision was made to reconstruct the defect utilizing an interpolation axial flap and a staged reconstruction.  A telfa template was made of the defect.  This telfa template was then used to outline the interpolation flap.  The donor area for the pedicle flap was then injected with anesthesia.  The flap was excised through the skin and subcutaneous tissue down to the layer of the underlying musculature.  The interpolation flap was carefully excised within this deep plane to maintain its blood supply.  The edges of the donor site were undermined.   The donor site was closed in a primary fashion.  The pedicle was then rotated into position and sutured.  Once the tube was sutured into place, adequate blood supply was confirmed with blanching and refill.  The pedicle was then wrapped with xeroform gauze and dressed appropriately with a telfa and gauze bandage to ensure continued blood supply and protect the attached pedicle.
Complex Repair And Single Advancement Flap Text: The defect edges were debeveled with a #15 scalpel blade.  The primary defect was closed partially with a complex linear closure.  Given the location of the remaining defect, shape of the defect and the proximity to free margins a single advancement flap was deemed most appropriate for complete closure of the defect.  Using a sterile surgical marker, an appropriate advancement flap was drawn incorporating the defect and placing the expected incisions within the relaxed skin tension lines where possible.    The area thus outlined was incised deep to adipose tissue with a #15 scalpel blade.  The skin margins were undermined to an appropriate distance in all directions utilizing iris scissors.
O-L Flap Text: The defect edges were debeveled with a #15 scalpel blade.  Given the location of the defect, shape of the defect and the proximity to free margins an O-L flap was deemed most appropriate.  Using a sterile surgical marker, an appropriate advancement flap was drawn incorporating the defect and placing the expected incisions within the relaxed skin tension lines where possible.    The area thus outlined was incised deep to adipose tissue with a #15 scalpel blade.  The skin margins were undermined to an appropriate distance in all directions utilizing iris scissors.
Complex Repair And Epidermal Autograft Text: The defect edges were debeveled with a #15 scalpel blade.  The primary defect was closed partially with a complex linear closure.  Given the location of the defect, shape of the defect and the proximity to free margins an epidermal autograft was deemed most appropriate to repair the remaining defect.  The graft was trimmed to fit the size of the remaining defect.  The graft was then placed in the primary defect, oriented appropriately, and sutured into place.
Size Of Margin In Cm: 0.2
Excision Method: Saucerization
Elliptical Excision Additional Text (Leave Blank If You Do Not Want): The margin was drawn around the clinically apparent lesion.  An elliptical shape was then drawn on the skin incorporating the lesion and margins.  Incisions were then made along these lines to the appropriate tissue plane and the lesion was extirpated.
Bilobed Flap Text: The defect edges were debeveled with a #15 scalpel blade.  Given the location of the defect and the proximity to free margins a bilobe flap was deemed most appropriate.  Using a sterile surgical marker, an appropriate bilobe flap drawn around the defect.    The area thus outlined was incised deep to adipose tissue with a #15 scalpel blade.  The skin margins were undermined to an appropriate distance in all directions utilizing iris scissors.
Dressing: dry sterile dressing
V-Y Flap Text: The defect edges were debeveled with a #15 scalpel blade.  Given the location of the defect, shape of the defect and the proximity to free margins a V-Y flap was deemed most appropriate.  Using a sterile surgical marker, an appropriate advancement flap was drawn incorporating the defect and placing the expected incisions within the relaxed skin tension lines where possible.    The area thus outlined was incised deep to adipose tissue with a #15 scalpel blade.  The skin margins were undermined to an appropriate distance in all directions utilizing iris scissors.
Complex Repair And Double Advancement Flap Text: The defect edges were debeveled with a #15 scalpel blade.  The primary defect was closed partially with a complex linear closure.  Given the location of the remaining defect, shape of the defect and the proximity to free margins a double advancement flap was deemed most appropriate for complete closure of the defect.  Using a sterile surgical marker, an appropriate advancement flap was drawn incorporating the defect and placing the expected incisions within the relaxed skin tension lines where possible.    The area thus outlined was incised deep to adipose tissue with a #15 scalpel blade.  The skin margins were undermined to an appropriate distance in all directions utilizing iris scissors.
Bilobed Transposition Flap Text: The defect edges were debeveled with a #15 scalpel blade.  Given the location of the defect and the proximity to free margins a bilobed transposition flap was deemed most appropriate.  Using a sterile surgical marker, an appropriate bilobe flap drawn around the defect.    The area thus outlined was incised deep to adipose tissue with a #15 scalpel blade.  The skin margins were undermined to an appropriate distance in all directions utilizing iris scissors.
Bill 31625 For Specimen Handling/Conveyance To Laboratory?: no
Complex Repair And Dermal Autograft Text: The defect edges were debeveled with a #15 scalpel blade.  The primary defect was closed partially with a complex linear closure.  Given the location of the defect, shape of the defect and the proximity to free margins an dermal autograft was deemed most appropriate to repair the remaining defect.  The graft was trimmed to fit the size of the remaining defect.  The graft was then placed in the primary defect, oriented appropriately, and sutured into place.
Melolabial Interpolation Flap Text: A decision was made to reconstruct the defect utilizing an interpolation axial flap and a staged reconstruction.  A telfa template was made of the defect.  This telfa template was then used to outline the melolabial interpolation flap.  The donor area for the pedicle flap was then injected with anesthesia.  The flap was excised through the skin and subcutaneous tissue down to the layer of the underlying musculature.  The pedicle flap was carefully excised within this deep plane to maintain its blood supply.  The edges of the donor site were undermined.   The donor site was closed in a primary fashion.  The pedicle was then rotated into position and sutured.  Once the tube was sutured into place, adequate blood supply was confirmed with blanching and refill.  The pedicle was then wrapped with xeroform gauze and dressed appropriately with a telfa and gauze bandage to ensure continued blood supply and protect the attached pedicle.
Complex Repair And Tissue Cultured Epidermal Autograft Text: The defect edges were debeveled with a #15 scalpel blade.  The primary defect was closed partially with a complex linear closure.  Given the location of the defect, shape of the defect and the proximity to free margins an tissue cultured epidermal autograft was deemed most appropriate to repair the remaining defect.  The graft was trimmed to fit the size of the remaining defect.  The graft was then placed in the primary defect, oriented appropriately, and sutured into place.
Saucerization Excision Additional Text (Leave Blank If You Do Not Want): The margin was drawn around the clinically apparent lesion.  Incisions were then made along these lines, in a tangential fashion, to the appropriate tissue plane and the lesion was extirpated.
Hemostasis: Electrocautery
Trilobed Flap Text: The defect edges were debeveled with a #15 scalpel blade.  Given the location of the defect and the proximity to free margins a trilobed flap was deemed most appropriate.  Using a sterile surgical marker, an appropriate trilobed flap drawn around the defect.    The area thus outlined was incised deep to adipose tissue with a #15 scalpel blade.  The skin margins were undermined to an appropriate distance in all directions utilizing iris scissors.
Mastoid Interpolation Flap Text: A decision was made to reconstruct the defect utilizing an interpolation axial flap and a staged reconstruction.  A telfa template was made of the defect.  This telfa template was then used to outline the mastoid interpolation flap.  The donor area for the pedicle flap was then injected with anesthesia.  The flap was excised through the skin and subcutaneous tissue down to the layer of the underlying musculature.  The pedicle flap was carefully excised within this deep plane to maintain its blood supply.  The edges of the donor site were undermined.   The donor site was closed in a primary fashion.  The pedicle was then rotated into position and sutured.  Once the tube was sutured into place, adequate blood supply was confirmed with blanching and refill.  The pedicle was then wrapped with xeroform gauze and dressed appropriately with a telfa and gauze bandage to ensure continued blood supply and protect the attached pedicle.
Advancement-Rotation Flap Text: The defect edges were debeveled with a #15 scalpel blade.  Given the location of the defect, shape of the defect and the proximity to free margins an advancement-rotation flap was deemed most appropriate.  Using a sterile surgical marker, an appropriate flap was drawn incorporating the defect and placing the expected incisions within the relaxed skin tension lines where possible. The area thus outlined was incised deep to adipose tissue with a #15 scalpel blade.  The skin margins were undermined to an appropriate distance in all directions utilizing iris scissors.
Complex Repair And Modified Advancement Flap Text: The defect edges were debeveled with a #15 scalpel blade.  The primary defect was closed partially with a complex linear closure.  Given the location of the remaining defect, shape of the defect and the proximity to free margins a modified advancement flap was deemed most appropriate for complete closure of the defect.  Using a sterile surgical marker, an appropriate advancement flap was drawn incorporating the defect and placing the expected incisions within the relaxed skin tension lines where possible.    The area thus outlined was incised deep to adipose tissue with a #15 scalpel blade.  The skin margins were undermined to an appropriate distance in all directions utilizing iris scissors.
Posterior Auricular Interpolation Flap Text: A decision was made to reconstruct the defect utilizing an interpolation axial flap and a staged reconstruction.  A telfa template was made of the defect.  This telfa template was then used to outline the posterior auricular interpolation flap.  The donor area for the pedicle flap was then injected with anesthesia.  The flap was excised through the skin and subcutaneous tissue down to the layer of the underlying musculature.  The pedicle flap was carefully excised within this deep plane to maintain its blood supply.  The edges of the donor site were undermined.   The donor site was closed in a primary fashion.  The pedicle was then rotated into position and sutured.  Once the tube was sutured into place, adequate blood supply was confirmed with blanching and refill.  The pedicle was then wrapped with xeroform gauze and dressed appropriately with a telfa and gauze bandage to ensure continued blood supply and protect the attached pedicle.
Mercedes Flap Text: The defect edges were debeveled with a #15 scalpel blade.  Given the location of the defect, shape of the defect and the proximity to free margins a Mercedes flap was deemed most appropriate.  Using a sterile surgical marker, an appropriate advancement flap was drawn incorporating the defect and placing the expected incisions within the relaxed skin tension lines where possible. The area thus outlined was incised deep to adipose tissue with a #15 scalpel blade.  The skin margins were undermined to an appropriate distance in all directions utilizing iris scissors.
Complex Repair And A-T Advancement Flap Text: The defect edges were debeveled with a #15 scalpel blade.  The primary defect was closed partially with a complex linear closure.  Given the location of the remaining defect, shape of the defect and the proximity to free margins an A-T advancement flap was deemed most appropriate for complete closure of the defect.  Using a sterile surgical marker, an appropriate advancement flap was drawn incorporating the defect and placing the expected incisions within the relaxed skin tension lines where possible.    The area thus outlined was incised deep to adipose tissue with a #15 scalpel blade.  The skin margins were undermined to an appropriate distance in all directions utilizing iris scissors.
Lab: [id number]
Complex Repair And Xenograft Text: The defect edges were debeveled with a #15 scalpel blade.  The primary defect was closed partially with a complex linear closure.  Given the location of the defect, shape of the defect and the proximity to free margins a xenograft was deemed most appropriate to repair the remaining defect.  The graft was trimmed to fit the size of the remaining defect.  The graft was then placed in the primary defect, oriented appropriately, and sutured into place.
Anesthesia Volume In Cc: 12
Repair Type: None (Simple)
Estimated Blood Loss (Cc): minimal
Dorsal Nasal Flap Text: The defect edges were debeveled with a #15 scalpel blade.  Given the location of the defect and the proximity to free margins a dorsal nasal flap was deemed most appropriate.  Using a sterile surgical marker, an appropriate dorsal nasal flap was drawn around the defect.    The area thus outlined was incised deep to adipose tissue with a #15 scalpel blade.  The skin margins were undermined to an appropriate distance in all directions utilizing iris scissors.
Lab Facility: 
Complex Repair And O-T Advancement Flap Text: The defect edges were debeveled with a #15 scalpel blade.  The primary defect was closed partially with a complex linear closure.  Given the location of the remaining defect, shape of the defect and the proximity to free margins an O-T advancement flap was deemed most appropriate for complete closure of the defect.  Using a sterile surgical marker, an appropriate advancement flap was drawn incorporating the defect and placing the expected incisions within the relaxed skin tension lines where possible.    The area thus outlined was incised deep to adipose tissue with a #15 scalpel blade.  The skin margins were undermined to an appropriate distance in all directions utilizing iris scissors.
Modified Advancement Flap Text: The defect edges were debeveled with a #15 scalpel blade.  Given the location of the defect, shape of the defect and the proximity to free margins a modified advancement flap was deemed most appropriate.  Using a sterile surgical marker, an appropriate advancement flap was drawn incorporating the defect and placing the expected incisions within the relaxed skin tension lines where possible.    The area thus outlined was incised deep to adipose tissue with a #15 scalpel blade.  The skin margins were undermined to an appropriate distance in all directions utilizing iris scissors.
Island Pedicle Flap Text: The defect edges were debeveled with a #15 scalpel blade.  Given the location of the defect, shape of the defect and the proximity to free margins an island pedicle advancement flap was deemed most appropriate.  Using a sterile surgical marker, an appropriate advancement flap was drawn incorporating the defect, outlining the appropriate donor tissue and placing the expected incisions within the relaxed skin tension lines where possible.    The area thus outlined was incised deep to adipose tissue with a #15 scalpel blade.  The skin margins were undermined to an appropriate distance in all directions around the primary defect and laterally outward around the island pedicle utilizing iris scissors.  There was minimal undermining beneath the pedicle flap.
Complex Repair And Skin Substitute Graft Text: The defect edges were debeveled with a #15 scalpel blade.  The primary defect was closed partially with a complex linear closure.  Given the location of the remaining defect, shape of the defect and the proximity to free margins a skin substitute graft was deemed most appropriate to repair the remaining defect.  The graft was trimmed to fit the size of the remaining defect.  The graft was then placed in the primary defect, oriented appropriately, and sutured into place.
Paramedian Forehead Flap Text: A decision was made to reconstruct the defect utilizing an interpolation axial flap and a staged reconstruction.  A telfa template was made of the defect.  This telfa template was then used to outline the paramedian forehead pedicle flap.  The donor area for the pedicle flap was then injected with anesthesia.  The flap was excised through the skin and subcutaneous tissue down to the layer of the underlying musculature.  The pedicle flap was carefully excised within this deep plane to maintain its blood supply.  The edges of the donor site were undermined.   The donor site was closed in a primary fashion.  The pedicle was then rotated into position and sutured.  Once the tube was sutured into place, adequate blood supply was confirmed with blanching and refill.  The pedicle was then wrapped with xeroform gauze and dressed appropriately with a telfa and gauze bandage to ensure continued blood supply and protect the attached pedicle.
Graft Donor Site Bandage (Optional-Leave Blank If You Don't Want In Note): Steri-strips and a pressure bandage were applied to the donor site.
Excision Depth: adipose tissue
Path Notes (To The Dermatopathologist): Please check margins.
Lip Wedge Excision Repair Text: Given the location of the defect and the proximity to free margins a full thickness wedge repair was deemed most appropriate.  Using a sterile surgical marker, the appropriate repair was drawn incorporating the defect and placing the expected incisions perpendicular to the vermilion border.  The vermilion border was also meticulously outlined to ensure appropriate reapproximation during the repair.  The area thus outlined was incised through and through with a #15 scalpel blade.  The muscularis and dermis were reaproximated with deep sutures following hemostasis. Care was taken to realign the vermilion border before proceeding with the superficial closure.  Once the vermilion was realigned the superfical and mucosal closure was finished.
Positioning (Leave Blank If You Do Not Want): The patient was placed in a comfortable position exposing the surgical site.
Body Location Override (Optional - Billing Will Still Be Based On Selected Body Map Location If Applicable): [email or web address].medial pretibial region
Island Pedicle Flap With Canthal Suspension Text: The defect edges were debeveled with a #15 scalpel blade.  Given the location of the defect, shape of the defect and the proximity to free margins an island pedicle advancement flap was deemed most appropriate.  Using a sterile surgical marker, an appropriate advancement flap was drawn incorporating the defect, outlining the appropriate donor tissue and placing the expected incisions within the relaxed skin tension lines where possible. The area thus outlined was incised deep to adipose tissue with a #15 scalpel blade.  The skin margins were undermined to an appropriate distance in all directions around the primary defect and laterally outward around the island pedicle utilizing iris scissors.  There was minimal undermining beneath the pedicle flap. A suspension suture was placed in the canthal tendon to prevent tension and prevent ectropion.
Complex Repair And O-L Flap Text: The defect edges were debeveled with a #15 scalpel blade.  The primary defect was closed partially with a complex linear closure.  Given the location of the remaining defect, shape of the defect and the proximity to free margins an O-L flap was deemed most appropriate for complete closure of the defect.  Using a sterile surgical marker, an appropriate flap was drawn incorporating the defect and placing the expected incisions within the relaxed skin tension lines where possible.    The area thus outlined was incised deep to adipose tissue with a #15 scalpel blade.  The skin margins were undermined to an appropriate distance in all directions utilizing iris scissors.
Mucosal Advancement Flap Text: Given the location of the defect, shape of the defect and the proximity to free margins a mucosal advancement flap was deemed most appropriate. Incisions were made with a 15 blade scalpel in the appropriate fashion along the cutaneous vermilion border and the mucosal lip. The remaining actinically damaged mucosal tissue was excised.  The mucosal advancement flap was then elevated to the gingival sulcus with care taken to preserve the neurovascular structures and advanced into the primary defect. Care was taken to ensure that precise realignment of the vermilion border was achieved.
Ftsg Text: The defect edges were debeveled with a #15 scalpel blade.  Given the location of the defect, shape of the defect and the proximity to free margins a full thickness skin graft was deemed most appropriate.  Using a sterile surgical marker, the primary defect shape was transferred to the donor site. The area thus outlined was incised deep to adipose tissue with a #15 scalpel blade.  The harvested graft was then trimmed of adipose tissue until only dermis and epidermis was left.  The skin margins of the secondary defect were undermined to an appropriate distance in all directions utilizing iris scissors.  The secondary defect was closed with interrupted buried subcutaneous sutures.  The skin edges were then re-apposed with running  sutures.  The skin graft was then placed in the primary defect and oriented appropriately.
Hatchet Flap Text: The defect edges were debeveled with a #15 scalpel blade.  Given the location of the defect, shape of the defect and the proximity to free margins a hatchet flap was deemed most appropriate.  Using a sterile surgical marker, an appropriate hatchet flap was drawn incorporating the defect and placing the expected incisions within the relaxed skin tension lines where possible.    The area thus outlined was incised deep to adipose tissue with a #15 scalpel blade.  The skin margins were undermined to an appropriate distance in all directions utilizing iris scissors.
Complex Repair And Bilobe Flap Text: The defect edges were debeveled with a #15 scalpel blade.  The primary defect was closed partially with a complex linear closure.  Given the location of the remaining defect, shape of the defect and the proximity to free margins a bilobe flap was deemed most appropriate for complete closure of the defect.  Using a sterile surgical marker, an appropriate advancement flap was drawn incorporating the defect and placing the expected incisions within the relaxed skin tension lines where possible.    The area thus outlined was incised deep to adipose tissue with a #15 scalpel blade.  The skin margins were undermined to an appropriate distance in all directions utilizing iris scissors.
Intermediate / Complex Repair - Final Wound Length In Cm: 2.5
Alar Island Pedicle Flap Text: The defect edges were debeveled with a #15 scalpel blade.  Given the location of the defect, shape of the defect and the proximity to the alar rim an island pedicle advancement flap was deemed most appropriate.  Using a sterile surgical marker, an appropriate advancement flap was drawn incorporating the defect, outlining the appropriate donor tissue and placing the expected incisions within the nasal ala running parallel to the alar rim. The area thus outlined was incised with a #15 scalpel blade.  The skin margins were undermined minimally to an appropriate distance in all directions around the primary defect and laterally outward around the island pedicle utilizing iris scissors.  There was minimal undermining beneath the pedicle flap.
Pre-Excision Curettage Text (Leave Blank If You Do Not Want): Prior to drawing the surgical margin the visible lesion was removed with electrodesiccation and curettage to clearly define the lesion size.
Rotation Flap Text: The defect edges were debeveled with a #15 scalpel blade.  Given the location of the defect, shape of the defect and the proximity to free margins a rotation flap was deemed most appropriate.  Using a sterile surgical marker, an appropriate rotation flap was drawn incorporating the defect and placing the expected incisions within the relaxed skin tension lines where possible.    The area thus outlined was incised deep to adipose tissue with a #15 scalpel blade.  The skin margins were undermined to an appropriate distance in all directions utilizing iris scissors.
Complex Repair And Melolabial Flap Text: The defect edges were debeveled with a #15 scalpel blade.  The primary defect was closed partially with a complex linear closure.  Given the location of the remaining defect, shape of the defect and the proximity to free margins a melolabial flap was deemed most appropriate for complete closure of the defect.  Using a sterile surgical marker, an appropriate advancement flap was drawn incorporating the defect and placing the expected incisions within the relaxed skin tension lines where possible.    The area thus outlined was incised deep to adipose tissue with a #15 scalpel blade.  The skin margins were undermined to an appropriate distance in all directions utilizing iris scissors.
Complex Repair Preamble Text (Leave Blank If You Do Not Want): Extensive wide undermining was performed.
Double Island Pedicle Flap Text: The defect edges were debeveled with a #15 scalpel blade.  Given the location of the defect, shape of the defect and the proximity to free margins a double island pedicle advancement flap was deemed most appropriate.  Using a sterile surgical marker, an appropriate advancement flap was drawn incorporating the defect, outlining the appropriate donor tissue and placing the expected incisions within the relaxed skin tension lines where possible.    The area thus outlined was incised deep to adipose tissue with a #15 scalpel blade.  The skin margins were undermined to an appropriate distance in all directions around the primary defect and laterally outward around the island pedicle utilizing iris scissors.  There was minimal undermining beneath the pedicle flap.
Scalpel Size: 15 blade
Split-Thickness Skin Graft Text: The defect edges were debeveled with a #15 scalpel blade.  Given the location of the defect, shape of the defect and the proximity to free margins a split thickness skin graft was deemed most appropriate.  Using a sterile surgical marker, the primary defect shape was transferred to the donor site. The split thickness graft was then harvested.  The skin graft was then placed in the primary defect and oriented appropriately.
Consent was obtained from the patient. The risks and benefits to therapy were discussed in detail. Specifically, the risks of infection, scarring, bleeding, prolonged wound healing, incomplete removal, allergy to anesthesia, nerve injury and recurrence were addressed. Prior to the procedure, the treatment site was clearly identified and confirmed by the patient. All components of Universal Protocol/PAUSE Rule completed.
Detail Level: Detailed
Slit Excision Additional Text (Leave Blank If You Do Not Want): A linear line was drawn on the skin overlying the lesion. An incision was made slowly until the lesion was visualized.  Once visualized, the lesion was removed with blunt dissection.
Primary Defect Length (In Cm): 0
Surgeon (Optional): Brock
Cartilage Graft Text: The defect edges were debeveled with a #15 scalpel blade.  Given the location of the defect, shape of the defect, the fact the defect involved a full thickness cartilage defect a cartilage graft was deemed most appropriate.  An appropriate donor site was identified, cleansed, and anesthetized. The cartilage graft was then harvested and transferred to the recipient site, oriented appropriately and then sutured into place.  The secondary defect was then repaired using a primary closure.
Intermediate Repair Preamble Text (Leave Blank If You Do Not Want): Undermining was performed with blunt dissection.
Excisional Biopsy Additional Text (Leave Blank If You Do Not Want): The margin was drawn around the clinically apparent lesion. An elliptical shape was then drawn on the skin incorporating the lesion and margins.  Incisions were then made along these lines to the appropriate tissue plane and the lesion was extirpated.
Complex Repair And Rotation Flap Text: The defect edges were debeveled with a #15 scalpel blade.  The primary defect was closed partially with a complex linear closure.  Given the location of the remaining defect, shape of the defect and the proximity to free margins a rotation flap was deemed most appropriate for complete closure of the defect.  Using a sterile surgical marker, an appropriate advancement flap was drawn incorporating the defect and placing the expected incisions within the relaxed skin tension lines where possible.    The area thus outlined was incised deep to adipose tissue with a #15 scalpel blade.  The skin margins were undermined to an appropriate distance in all directions utilizing iris scissors.
Spiral Flap Text: The defect edges were debeveled with a #15 scalpel blade.  Given the location of the defect, shape of the defect and the proximity to free margins a spiral flap was deemed most appropriate.  Using a sterile surgical marker, an appropriate rotation flap was drawn incorporating the defect and placing the expected incisions within the relaxed skin tension lines where possible. The area thus outlined was incised deep to adipose tissue with a #15 scalpel blade.  The skin margins were undermined to an appropriate distance in all directions utilizing iris scissors.
Island Pedicle Flap-Requiring Vessel Identification Text: The defect edges were debeveled with a #15 scalpel blade.  Given the location of the defect, shape of the defect and the proximity to free margins an island pedicle advancement flap was deemed most appropriate.  Using a sterile surgical marker, an appropriate advancement flap was drawn, based on the axial vessel mentioned above, incorporating the defect, outlining the appropriate donor tissue and placing the expected incisions within the relaxed skin tension lines where possible.    The area thus outlined was incised deep to adipose tissue with a #15 scalpel blade.  The skin margins were undermined to an appropriate distance in all directions around the primary defect and laterally outward around the island pedicle utilizing iris scissors.  There was minimal undermining beneath the pedicle flap.
Star Wedge Flap Text: The defect edges were debeveled with a #15 scalpel blade.  Given the location of the defect, shape of the defect and the proximity to free margins a star wedge flap was deemed most appropriate.  Using a sterile surgical marker, an appropriate rotation flap was drawn incorporating the defect and placing the expected incisions within the relaxed skin tension lines where possible. The area thus outlined was incised deep to adipose tissue with a #15 scalpel blade.  The skin margins were undermined to an appropriate distance in all directions utilizing iris scissors.
Complex Repair And Rhombic Flap Text: The defect edges were debeveled with a #15 scalpel blade.  The primary defect was closed partially with a complex linear closure.  Given the location of the remaining defect, shape of the defect and the proximity to free margins a rhombic flap was deemed most appropriate for complete closure of the defect.  Using a sterile surgical marker, an appropriate advancement flap was drawn incorporating the defect and placing the expected incisions within the relaxed skin tension lines where possible.    The area thus outlined was incised deep to adipose tissue with a #15 scalpel blade.  The skin margins were undermined to an appropriate distance in all directions utilizing iris scissors.
Keystone Flap Text: The defect edges were debeveled with a #15 scalpel blade.  Given the location of the defect, shape of the defect a keystone flap was deemed most appropriate.  Using a sterile surgical marker, an appropriate keystone flap was drawn incorporating the defect, outlining the appropriate donor tissue and placing the expected incisions within the relaxed skin tension lines where possible. The area thus outlined was incised deep to adipose tissue with a #15 scalpel blade.  The skin margins were undermined to an appropriate distance in all directions around the primary defect and laterally outward around the flap utilizing iris scissors.
Perilesional Excision Additional Text (Leave Blank If You Do Not Want): The margin was drawn around the clinically apparent lesion. Incisions were then made along these lines to the appropriate tissue plane and the lesion was extirpated.
Composite Graft Text: The defect edges were debeveled with a #15 scalpel blade.  Given the location of the defect, shape of the defect, the proximity to free margins and the fact the defect was full thickness a composite graft was deemed most appropriate.  The defect was outline and then transferred to the donor site.  A full thickness graft was then excised from the donor site. The graft was then placed in the primary defect, oriented appropriately and then sutured into place.  The secondary defect was then repaired using a primary closure.
Transposition Flap Text: The defect edges were debeveled with a #15 scalpel blade.  Given the location of the defect and the proximity to free margins a transposition flap was deemed most appropriate.  Using a sterile surgical marker, an appropriate transposition flap was drawn incorporating the defect.    The area thus outlined was incised deep to adipose tissue with a #15 scalpel blade.  The skin margins were undermined to an appropriate distance in all directions utilizing iris scissors.
Complex Repair And Transposition Flap Text: The defect edges were debeveled with a #15 scalpel blade.  The primary defect was closed partially with a complex linear closure.  Given the location of the remaining defect, shape of the defect and the proximity to free margins a transposition flap was deemed most appropriate for complete closure of the defect.  Using a sterile surgical marker, an appropriate advancement flap was drawn incorporating the defect and placing the expected incisions within the relaxed skin tension lines where possible.    The area thus outlined was incised deep to adipose tissue with a #15 scalpel blade.  The skin margins were undermined to an appropriate distance in all directions utilizing iris scissors.
O-T Plasty Text: The defect edges were debeveled with a #15 scalpel blade.  Given the location of the defect, shape of the defect and the proximity to free margins an O-T plasty was deemed most appropriate.  Using a sterile surgical marker, an appropriate O-T plasty was drawn incorporating the defect and placing the expected incisions within the relaxed skin tension lines where possible.    The area thus outlined was incised deep to adipose tissue with a #15 scalpel blade.  The skin margins were undermined to an appropriate distance in all directions utilizing iris scissors.
Post-Care Instructions: I reviewed with the patient in detail post-care instructions:\\n1. Apply bacitracin over the steri-strips.  \\n2. Cut non-stick pad (Telfa) to cover the steri-strips\\n3. Apply tape (hypafix) over the non-stick pad\\n4. Change once per day for 5 days\\n5. Shower with bandage on, change bandage after shower\\n\\nPatient is not to engage in any heavy lifting, exercise, hot tub, or swimming for the next 14 days. Should the patient develop any fevers, chills, bleeding, severe pain patient will contact the office immediately.
Information: Selecting Yes will display possible errors in your note based on the variables you have selected. This validation is only offered as a suggestion for you. PLEASE NOTE THAT THE VALIDATION TEXT WILL BE REMOVED WHEN YOU FINALIZE YOUR NOTE. IF YOU WANT TO FAX A PRELIMINARY NOTE YOU WILL NEED TO TOGGLE THIS TO 'NO' IF YOU DO NOT WANT IT IN YOUR FAXED NOTE.
Epidermal Autograft Text: The defect edges were debeveled with a #15 scalpel blade.  Given the location of the defect, shape of the defect and the proximity to free margins an epidermal autograft was deemed most appropriate.  Using a sterile surgical marker, the primary defect shape was transferred to the donor site. The epidermal graft was then harvested.  The skin graft was then placed in the primary defect and oriented appropriately.
Dermal Autograft Text: The defect edges were debeveled with a #15 scalpel blade.  Given the location of the defect, shape of the defect and the proximity to free margins a dermal autograft was deemed most appropriate.  Using a sterile surgical marker, the primary defect shape was transferred to the donor site. The area thus outlined was incised deep to adipose tissue with a #15 scalpel blade.  The harvested graft was then trimmed of adipose and epidermal tissue until only dermis was left.  The skin graft was then placed in the primary defect and oriented appropriately.
Repair Performed By Another Provider Text (Leave Blank If You Do Not Want): After the tissue was excised the defect was repaired by another provider.
Repair Anesthesia Method: local infiltration
Complex Repair And V-Y Plasty Text: The defect edges were debeveled with a #15 scalpel blade.  The primary defect was closed partially with a complex linear closure.  Given the location of the remaining defect, shape of the defect and the proximity to free margins a V-Y plasty was deemed most appropriate for complete closure of the defect.  Using a sterile surgical marker, an appropriate advancement flap was drawn incorporating the defect and placing the expected incisions within the relaxed skin tension lines where possible.    The area thus outlined was incised deep to adipose tissue with a #15 scalpel blade.  The skin margins were undermined to an appropriate distance in all directions utilizing iris scissors.
Muscle Hinge Flap Text: The defect edges were debeveled with a #15 scalpel blade.  Given the size, depth and location of the defect and the proximity to free margins a muscle hinge flap was deemed most appropriate.  Using a sterile surgical marker, an appropriate hinge flap was drawn incorporating the defect. The area thus outlined was incised with a #15 scalpel blade.  The skin margins were undermined to an appropriate distance in all directions utilizing iris scissors.
O-Z Plasty Text: The defect edges were debeveled with a #15 scalpel blade.  Given the location of the defect, shape of the defect and the proximity to free margins an O-Z plasty (double transposition flap) was deemed most appropriate.  Using a sterile surgical marker, the appropriate transposition flaps were drawn incorporating the defect and placing the expected incisions within the relaxed skin tension lines where possible.    The area thus outlined was incised deep to adipose tissue with a #15 scalpel blade.  The skin margins were undermined to an appropriate distance in all directions utilizing iris scissors.  Hemostasis was achieved with electrocautery.  The flaps were then transposed into place, one clockwise and the other counterclockwise, and anchored with interrupted buried subcutaneous sutures.
Home Suture Removal Text: Patient was provided a home suture removal kit and will remove their sutures at home.  If they have any questions or difficulties they will call the office.
Complex Repair And M Plasty Text: The defect edges were debeveled with a #15 scalpel blade.  The primary defect was closed partially with a complex linear closure.  Given the location of the remaining defect, shape of the defect and the proximity to free margins an M plasty was deemed most appropriate for complete closure of the defect.  Using a sterile surgical marker, an appropriate advancement flap was drawn incorporating the defect and placing the expected incisions within the relaxed skin tension lines where possible.    The area thus outlined was incised deep to adipose tissue with a #15 scalpel blade.  The skin margins were undermined to an appropriate distance in all directions utilizing iris scissors.
Melolabial Transposition Flap Text: The defect edges were debeveled with a #15 scalpel blade.  Given the location of the defect and the proximity to free margins a melolabial flap was deemed most appropriate.  Using a sterile surgical marker, an appropriate melolabial transposition flap was drawn incorporating the defect.    The area thus outlined was incised deep to adipose tissue with a #15 scalpel blade.  The skin margins were undermined to an appropriate distance in all directions utilizing iris scissors.
S Plasty Text: Given the location and shape of the defect, and the orientation of relaxed skin tension lines, an S-plasty was deemed most appropriate for repair.  Using a sterile surgical marker, the appropriate outline of the S-plasty was drawn, incorporating the defect and placing the expected incisions within the relaxed skin tension lines where possible.  The area thus outlined was incised deep to adipose tissue with a #15 scalpel blade.  The skin margins were undermined to an appropriate distance in all directions utilizing iris scissors. The skin flaps were advanced over the defect.  The opposing margins were then approximated with interrupted buried subcutaneous sutures.
No Repair - Repaired With Adjacent Surgical Defect Text (Leave Blank If You Do Not Want): After the excision the defect was repaired concurrently with another surgical defect which was in close approximation.
Skin Substitute Text: The defect edges were debeveled with a #15 scalpel blade.  Given the location of the defect, shape of the defect and the proximity to free margins a skin substitute graft was deemed most appropriate.  The graft material was trimmed to fit the size of the defect. The graft was then placed in the primary defect and oriented appropriately.
Rhombic Flap Text: The defect edges were debeveled with a #15 scalpel blade.  Given the location of the defect and the proximity to free margins a rhombic flap was deemed most appropriate.  Using a sterile surgical marker, an appropriate rhombic flap was drawn incorporating the defect.    The area thus outlined was incised deep to adipose tissue with a #15 scalpel blade.  The skin margins were undermined to an appropriate distance in all directions utilizing iris scissors.
V-Y Plasty Text: The defect edges were debeveled with a #15 scalpel blade.  Given the location of the defect, shape of the defect and the proximity to free margins an V-Y advancement flap was deemed most appropriate.  Using a sterile surgical marker, an appropriate advancement flap was drawn incorporating the defect and placing the expected incisions within the relaxed skin tension lines where possible.    The area thus outlined was incised deep to adipose tissue with a #15 scalpel blade.  The skin margins were undermined to an appropriate distance in all directions utilizing iris scissors.
Complex Repair And Double M Plasty Text: The defect edges were debeveled with a #15 scalpel blade.  The primary defect was closed partially with a complex linear closure.  Given the location of the remaining defect, shape of the defect and the proximity to free margins a double M plasty was deemed most appropriate for complete closure of the defect.  Using a sterile surgical marker, an appropriate advancement flap was drawn incorporating the defect and placing the expected incisions within the relaxed skin tension lines where possible.    The area thus outlined was incised deep to adipose tissue with a #15 scalpel blade.  The skin margins were undermined to an appropriate distance in all directions utilizing iris scissors.
Previous Accession (Optional): [id number]
Anesthesia Type: 1% lidocaine with epinephrine
Epidermal Closure Graft Donor Site (Optional): simple interrupted
Advancement Flap (Single) Text: The defect edges were debeveled with a #15 scalpel blade.  Given the location of the defect and the proximity to free margins a single advancement flap was deemed most appropriate.  Using a sterile surgical marker, an appropriate advancement flap was drawn incorporating the defect and placing the expected incisions within the relaxed skin tension lines where possible.    The area thus outlined was incised deep to adipose tissue with a #15 scalpel blade.  The skin margins were undermined to an appropriate distance in all directions utilizing iris scissors.
Tissue Cultured Epidermal Autograft Text: The defect edges were debeveled with a #15 scalpel blade.  Given the location of the defect, shape of the defect and the proximity to free margins a tissue cultured epidermal autograft was deemed most appropriate.  The graft was then trimmed to fit the size of the defect.  The graft was then placed in the primary defect and oriented appropriately.
Advancement Flap (Double) Text: The defect edges were debeveled with a #15 scalpel blade.  Given the location of the defect and the proximity to free margins a double advancement flap was deemed most appropriate.  Using a sterile surgical marker, the appropriate advancement flaps were drawn incorporating the defect and placing the expected incisions within the relaxed skin tension lines where possible.    The area thus outlined was incised deep to adipose tissue with a #15 scalpel blade.  The skin margins were undermined to an appropriate distance in all directions utilizing iris scissors.
Xenograft Text: The defect edges were debeveled with a #15 scalpel blade.  Given the location of the defect, shape of the defect and the proximity to free margins a xenograft was deemed most appropriate.  The graft was then trimmed to fit the size of the defect.  The graft was then placed in the primary defect and oriented appropriately.
Bi-Rhombic Flap Text: The defect edges were debeveled with a #15 scalpel blade.  Given the location of the defect and the proximity to free margins a bi-rhombic flap was deemed most appropriate.  Using a sterile surgical marker, an appropriate rhombic flap was drawn incorporating the defect. The area thus outlined was incised deep to adipose tissue with a #15 scalpel blade.  The skin margins were undermined to an appropriate distance in all directions utilizing iris scissors.
Complex Repair And W Plasty Text: The defect edges were debeveled with a #15 scalpel blade.  The primary defect was closed partially with a complex linear closure.  Given the location of the remaining defect, shape of the defect and the proximity to free margins a W plasty was deemed most appropriate for complete closure of the defect.  Using a sterile surgical marker, an appropriate advancement flap was drawn incorporating the defect and placing the expected incisions within the relaxed skin tension lines where possible.    The area thus outlined was incised deep to adipose tissue with a #15 scalpel blade.  The skin margins were undermined to an appropriate distance in all directions utilizing iris scissors.
H Plasty Text: Given the location of the defect, shape of the defect and the proximity to free margins a H-plasty was deemed most appropriate for repair.  Using a sterile surgical marker, the appropriate advancement arms of the H-plasty were drawn incorporating the defect and placing the expected incisions within the relaxed skin tension lines where possible. The area thus outlined was incised deep to adipose tissue with a #15 scalpel blade. The skin margins were undermined to an appropriate distance in all directions utilizing iris scissors.  The opposing advancement arms were then advanced into place in opposite direction and anchored with interrupted buried subcutaneous sutures.
Complex Repair And Z Plasty Text: The defect edges were debeveled with a #15 scalpel blade.  The primary defect was closed partially with a complex linear closure.  Given the location of the remaining defect, shape of the defect and the proximity to free margins a Z plasty was deemed most appropriate for complete closure of the defect.  Using a sterile surgical marker, an appropriate advancement flap was drawn incorporating the defect and placing the expected incisions within the relaxed skin tension lines where possible.    The area thus outlined was incised deep to adipose tissue with a #15 scalpel blade.  The skin margins were undermined to an appropriate distance in all directions utilizing iris scissors.
Date Of Previous Biopsy (Optional): 9/26/18
Helical Rim Advancement Flap Text: The defect edges were debeveled with a #15 blade scalpel.  Given the location of the defect and the proximity to free margins (helical rim) a double helical rim advancement flap was deemed most appropriate.  Using a sterile surgical marker, the appropriate advancement flaps were drawn incorporating the defect and placing the expected incisions between the helical rim and antihelix where possible.  The area thus outlined was incised through and through with a #15 scalpel blade.  With a skin hook and iris scissors, the flaps were gently and sharply undermined and freed up.
W Plasty Text: The lesion was extirpated to the level of the fat with a #15 scalpel blade.  Given the location of the defect, shape of the defect and the proximity to free margins a W-plasty was deemed most appropriate for repair.  Using a sterile surgical marker, the appropriate transposition arms of the W-plasty were drawn incorporating the defect and placing the expected incisions within the relaxed skin tension lines where possible.    The area thus outlined was incised deep to adipose tissue with a #15 scalpel blade.  The skin margins were undermined to an appropriate distance in all directions utilizing iris scissors.  The opposing transposition arms were then transposed into place in opposite direction and anchored with interrupted buried subcutaneous sutures.
Purse String (Intermediate) Text: Given the location of the defect and the characteristics of the surrounding skin a purse string intermediate closure was deemed most appropriate.  Undermining was performed circumfirentially around the surgical defect.  A purse string suture was then placed and tightened.
Burow's Advancement Flap Text: The defect edges were debeveled with a #15 scalpel blade.  Given the location of the defect and the proximity to free margins a Burow's advancement flap was deemed most appropriate.  Using a sterile surgical marker, the appropriate advancement flap was drawn incorporating the defect and placing the expected incisions within the relaxed skin tension lines where possible.    The area thus outlined was incised deep to adipose tissue with a #15 scalpel blade.  The skin margins were undermined to an appropriate distance in all directions utilizing iris scissors.
Z Plasty Text: The lesion was extirpated to the level of the fat with a #15 scalpel blade.  Given the location of the defect, shape of the defect and the proximity to free margins a Z-plasty was deemed most appropriate for repair.  Using a sterile surgical marker, the appropriate transposition arms of the Z-plasty were drawn incorporating the defect and placing the expected incisions within the relaxed skin tension lines where possible.    The area thus outlined was incised deep to adipose tissue with a #15 scalpel blade.  The skin margins were undermined to an appropriate distance in all directions utilizing iris scissors.  The opposing transposition arms were then transposed into place in opposite direction and anchored with interrupted buried subcutaneous sutures.
Purse String (Simple) Text: Given the location of the defect and the characteristics of the surrounding skin a purse string simple closure was deemed most appropriate.  Undermining was performed circumferentially around the surgical defect.  A purse string suture was then placed and tightened.
Complex Repair And Dorsal Nasal Flap Text: The defect edges were debeveled with a #15 scalpel blade.  The primary defect was closed partially with a complex linear closure.  Given the location of the remaining defect, shape of the defect and the proximity to free margins a dorsal nasal flap was deemed most appropriate for complete closure of the defect.  Using a sterile surgical marker, an appropriate flap was drawn incorporating the defect and placing the expected incisions within the relaxed skin tension lines where possible.    The area thus outlined was incised deep to adipose tissue with a #15 scalpel blade.  The skin margins were undermined to an appropriate distance in all directions utilizing iris scissors.
Size Of Lesion In Cm: 2.1
Undermining Location (Optional): in the superficial subcutaneous fat
Crescentic Advancement Flap Text: The defect edges were debeveled with a #15 scalpel blade.  Given the location of the defect and the proximity to free margins a crescentic advancement flap was deemed most appropriate.  Using a sterile surgical marker, the appropriate advancement flap was drawn incorporating the defect and placing the expected incisions within the relaxed skin tension lines where possible.    The area thus outlined was incised deep to adipose tissue with a #15 scalpel blade.  The skin margins were undermined to an appropriate distance in all directions utilizing iris scissors.
Bilateral Helical Rim Advancement Flap Text: The defect edges were debeveled with a #15 blade scalpel.  Given the location of the defect and the proximity to free margins (helical rim) a bilateral helical rim advancement flap was deemed most appropriate.  Using a sterile surgical marker, the appropriate advancement flaps were drawn incorporating the defect and placing the expected incisions between the helical rim and antihelix where possible.  The area thus outlined was incised through and through with a #15 scalpel blade.  With a skin hook and iris scissors, the flaps were gently and sharply undermined and freed up.
A-T Advancement Flap Text: The defect edges were debeveled with a #15 scalpel blade.  Given the location of the defect, shape of the defect and the proximity to free margins an A-T advancement flap was deemed most appropriate.  Using a sterile surgical marker, an appropriate advancement flap was drawn incorporating the defect and placing the expected incisions within the relaxed skin tension lines where possible.    The area thus outlined was incised deep to adipose tissue with a #15 scalpel blade.  The skin margins were undermined to an appropriate distance in all directions utilizing iris scissors.
Partial Purse String (Intermediate) Text: Given the location of the defect and the characteristics of the surrounding skin an intermediate purse string closure was deemed most appropriate.  Undermining was performed circumferentially around the surgical defect.  A purse string suture was then placed and tightened. Wound tension of the circular defect prevented complete closure of the wound.
Ear Star Wedge Flap Text: The defect edges were debeveled with a #15 blade scalpel.  Given the location of the defect and the proximity to free margins (helical rim) an ear star wedge flap was deemed most appropriate.  Using a sterile surgical marker, the appropriate flap was drawn incorporating the defect and placing the expected incisions between the helical rim and antihelix where possible.  The area thus outlined was incised through and through with a #15 scalpel blade.
Wound Care: Bacitracin
Cheek Interpolation Flap Text: A decision was made to reconstruct the defect utilizing an interpolation axial flap and a staged reconstruction.  A telfa template was made of the defect.  This telfa template was then used to outline the Cheek Interpolation flap.  The donor area for the pedicle flap was then injected with anesthesia.  The flap was excised through the skin and subcutaneous tissue down to the layer of the underlying musculature.  The interpolation flap was carefully excised within this deep plane to maintain its blood supply.  The edges of the donor site were undermined.   The donor site was closed in a primary fashion.  The pedicle was then rotated into position and sutured.  Once the tube was sutured into place, adequate blood supply was confirmed with blanching and refill.  The pedicle was then wrapped with xeroform gauze and dressed appropriately with a telfa and gauze bandage to ensure continued blood supply and protect the attached pedicle.
Complex Repair And Ftsg Text: The defect edges were debeveled with a #15 scalpel blade.  The primary defect was closed partially with a complex linear closure.  Given the location of the defect, shape of the defect and the proximity to free margins a full thickness skin graft was deemed most appropriate to repair the remaining defect.  The graft was trimmed to fit the size of the remaining defect.  The graft was then placed in the primary defect, oriented appropriately, and sutured into place.
Complex Repair And Split-Thickness Skin Graft Text: The defect edges were debeveled with a #15 scalpel blade.  The primary defect was closed partially with a complex linear closure.  Given the location of the defect, shape of the defect and the proximity to free margins a split thickness skin graft was deemed most appropriate to repair the remaining defect.  The graft was trimmed to fit the size of the remaining defect.  The graft was then placed in the primary defect, oriented appropriately, and sutured into place.
Curvilinear Excision Additional Text (Leave Blank If You Do Not Want): The margin was drawn around the clinically apparent lesion.  A curvilinear shape was then drawn on the skin incorporating the lesion and margins.  Incisions were then made along these lines to the appropriate tissue plane and the lesion was extirpated.
Billing Type: Third-Party Bill
Additional Anesthesia Volume In Cc: 6

## 2018-11-21 ENCOUNTER — HOSPITAL ENCOUNTER (OUTPATIENT)
Dept: LAB | Facility: MEDICAL CENTER | Age: 78
End: 2018-11-21
Attending: INTERNAL MEDICINE
Payer: MEDICARE

## 2018-11-21 ENCOUNTER — HOSPITAL ENCOUNTER (OUTPATIENT)
Dept: LAB | Facility: MEDICAL CENTER | Age: 78
End: 2018-11-21
Attending: FAMILY MEDICINE
Payer: MEDICARE

## 2018-11-21 DIAGNOSIS — I10 ESSENTIAL HYPERTENSION: ICD-10-CM

## 2018-11-21 DIAGNOSIS — E11.40 TYPE 2 DIABETES, CONTROLLED, WITH NEUROPATHY (HCC): ICD-10-CM

## 2018-11-21 DIAGNOSIS — I50.810 RIGHT-SIDED HEART FAILURE, UNSPECIFIED HF CHRONICITY (HCC): ICD-10-CM

## 2018-11-21 LAB
ALBUMIN SERPL BCP-MCNC: 4.1 G/DL (ref 3.2–4.9)
ALBUMIN/GLOB SERPL: 1.3 G/DL
ALP SERPL-CCNC: 95 U/L (ref 30–99)
ALT SERPL-CCNC: 10 U/L (ref 2–50)
ANION GAP SERPL CALC-SCNC: 9 MMOL/L (ref 0–11.9)
APPEARANCE UR: CLEAR
AST SERPL-CCNC: 8 U/L (ref 12–45)
BASOPHILS # BLD AUTO: 0.7 % (ref 0–1.8)
BASOPHILS # BLD: 0.06 K/UL (ref 0–0.12)
BILIRUB SERPL-MCNC: 0.3 MG/DL (ref 0.1–1.5)
BILIRUB UR QL STRIP.AUTO: NEGATIVE
BNP SERPL-MCNC: 32 PG/ML (ref 0–100)
BUN SERPL-MCNC: 23 MG/DL (ref 8–22)
CALCIUM SERPL-MCNC: 9.4 MG/DL (ref 8.5–10.5)
CHLORIDE SERPL-SCNC: 103 MMOL/L (ref 96–112)
CHOLEST SERPL-MCNC: 153 MG/DL (ref 100–199)
CO2 SERPL-SCNC: 25 MMOL/L (ref 20–33)
COLOR UR: YELLOW
CREAT SERPL-MCNC: 1.19 MG/DL (ref 0.5–1.4)
CREAT UR-MCNC: 108.6 MG/DL
EOSINOPHIL # BLD AUTO: 0.22 K/UL (ref 0–0.51)
EOSINOPHIL NFR BLD: 2.5 % (ref 0–6.9)
ERYTHROCYTE [DISTWIDTH] IN BLOOD BY AUTOMATED COUNT: 45 FL (ref 35.9–50)
EST. AVERAGE GLUCOSE BLD GHB EST-MCNC: 160 MG/DL
FASTING STATUS PATIENT QL REPORTED: NORMAL
GLOBULIN SER CALC-MCNC: 3.2 G/DL (ref 1.9–3.5)
GLUCOSE SERPL-MCNC: 131 MG/DL (ref 65–99)
GLUCOSE UR STRIP.AUTO-MCNC: NEGATIVE MG/DL
HBA1C MFR BLD: 7.2 % (ref 0–5.6)
HCT VFR BLD AUTO: 41.8 % (ref 42–52)
HDLC SERPL-MCNC: 36 MG/DL
HGB BLD-MCNC: 13.4 G/DL (ref 14–18)
IMM GRANULOCYTES # BLD AUTO: 0.05 K/UL (ref 0–0.11)
IMM GRANULOCYTES NFR BLD AUTO: 0.6 % (ref 0–0.9)
KETONES UR STRIP.AUTO-MCNC: NEGATIVE MG/DL
LDLC SERPL CALC-MCNC: 58 MG/DL
LEUKOCYTE ESTERASE UR QL STRIP.AUTO: NEGATIVE
LYMPHOCYTES # BLD AUTO: 1.6 K/UL (ref 1–4.8)
LYMPHOCYTES NFR BLD: 18 % (ref 22–41)
MCH RBC QN AUTO: 28.8 PG (ref 27–33)
MCHC RBC AUTO-ENTMCNC: 32.1 G/DL (ref 33.7–35.3)
MCV RBC AUTO: 89.9 FL (ref 81.4–97.8)
MICRO URNS: NORMAL
MICROALBUMIN UR-MCNC: 3.9 MG/DL
MICROALBUMIN/CREAT UR: 36 MG/G (ref 0–30)
MONOCYTES # BLD AUTO: 0.74 K/UL (ref 0–0.85)
MONOCYTES NFR BLD AUTO: 8.3 % (ref 0–13.4)
NEUTROPHILS # BLD AUTO: 6.21 K/UL (ref 1.82–7.42)
NEUTROPHILS NFR BLD: 69.9 % (ref 44–72)
NITRITE UR QL STRIP.AUTO: NEGATIVE
NRBC # BLD AUTO: 0 K/UL
NRBC BLD-RTO: 0 /100 WBC
PH UR STRIP.AUTO: 5 [PH]
PLATELET # BLD AUTO: 342 K/UL (ref 164–446)
PMV BLD AUTO: 9 FL (ref 9–12.9)
POTASSIUM SERPL-SCNC: 4.7 MMOL/L (ref 3.6–5.5)
PROT SERPL-MCNC: 7.3 G/DL (ref 6–8.2)
PROT UR QL STRIP: NEGATIVE MG/DL
RBC # BLD AUTO: 4.65 M/UL (ref 4.7–6.1)
RBC UR QL AUTO: NEGATIVE
SODIUM SERPL-SCNC: 137 MMOL/L (ref 135–145)
SP GR UR STRIP.AUTO: 1.02
TRIGL SERPL-MCNC: 296 MG/DL (ref 0–149)
TSH SERPL DL<=0.005 MIU/L-ACNC: 2.19 UIU/ML (ref 0.38–5.33)
UROBILINOGEN UR STRIP.AUTO-MCNC: 0.2 MG/DL
WBC # BLD AUTO: 8.9 K/UL (ref 4.8–10.8)

## 2018-11-21 PROCEDURE — 83036 HEMOGLOBIN GLYCOSYLATED A1C: CPT | Mod: GA

## 2018-11-21 PROCEDURE — 85025 COMPLETE CBC W/AUTO DIFF WBC: CPT

## 2018-11-21 PROCEDURE — 82570 ASSAY OF URINE CREATININE: CPT

## 2018-11-21 PROCEDURE — 83880 ASSAY OF NATRIURETIC PEPTIDE: CPT

## 2018-11-21 PROCEDURE — 84443 ASSAY THYROID STIM HORMONE: CPT

## 2018-11-21 PROCEDURE — 82088 ASSAY OF ALDOSTERONE: CPT

## 2018-11-21 PROCEDURE — 36415 COLL VENOUS BLD VENIPUNCTURE: CPT

## 2018-11-21 PROCEDURE — 80053 COMPREHEN METABOLIC PANEL: CPT

## 2018-11-21 PROCEDURE — 84244 ASSAY OF RENIN: CPT

## 2018-11-21 PROCEDURE — 83835 ASSAY OF METANEPHRINES: CPT

## 2018-11-21 PROCEDURE — 81003 URINALYSIS AUTO W/O SCOPE: CPT

## 2018-11-21 PROCEDURE — 80061 LIPID PANEL: CPT

## 2018-11-21 PROCEDURE — 82043 UR ALBUMIN QUANTITATIVE: CPT

## 2018-11-24 LAB
ALDOST SERPL-MCNC: 29.3 NG/DL
RENIN PLAS-CCNC: 17.6 NG/ML/HR

## 2018-11-26 LAB
METANEPHS SERPL-SCNC: 0.22 NMOL/L (ref 0–0.49)
NORMETANEPHRINE SERPL-SCNC: 0.83 NMOL/L (ref 0–0.89)

## 2018-11-27 ENCOUNTER — OFFICE VISIT (OUTPATIENT)
Dept: MEDICAL GROUP | Facility: MEDICAL CENTER | Age: 78
End: 2018-11-27
Payer: MEDICARE

## 2018-11-27 VITALS
TEMPERATURE: 96.7 F | BODY MASS INDEX: 31.78 KG/M2 | HEIGHT: 70 IN | WEIGHT: 222 LBS | SYSTOLIC BLOOD PRESSURE: 130 MMHG | OXYGEN SATURATION: 98 % | HEART RATE: 77 BPM | DIASTOLIC BLOOD PRESSURE: 76 MMHG

## 2018-11-27 DIAGNOSIS — I10 ESSENTIAL HYPERTENSION: ICD-10-CM

## 2018-11-27 DIAGNOSIS — E11.40 TYPE 2 DIABETES MELLITUS WITH DIABETIC NEUROPATHY, WITHOUT LONG-TERM CURRENT USE OF INSULIN (HCC): ICD-10-CM

## 2018-11-27 DIAGNOSIS — E78.5 DYSLIPIDEMIA: ICD-10-CM

## 2018-11-27 PROCEDURE — 99214 OFFICE O/P EST MOD 30 MIN: CPT | Performed by: FAMILY MEDICINE

## 2018-11-27 NOTE — ASSESSMENT & PLAN NOTE
Patient was seen by hypertension specialist and now is on Cartia  mg daily, doxazosin 2 mg daily, eplerenone 50 mg daily, hydrochlorothiazide 25 mg daily and lisinopril 40 mg daily.  Overall he is feeling better, no more anxiety.  Has been walking to keep blood pressure down.

## 2018-11-27 NOTE — PROGRESS NOTES
Subjective:   Mercedez Ca is a 78 y.o. male here today for diabetes    Diabetic neuropathy, type II diabetes mellitus  Blood sugars have been 120-170. He takes his medication as prescribed (Janumet  mg twice daily) and eats whatever he wants. Trying to exercise, because his blood pressure goes down with exercise.    Results for MERCEDEZ CA (MRN [id number]) as of 11/27/2018 10:26   Ref. Range 8/16/2018 08:02 11/21/2018 07:53   Glycohemoglobin Latest Ref Range: 0.0 - 5.6 % 7.6 (H) 7.2 (H)       Dyslipidemia  Patient is on pravastatin 40 mg daily.  Patient recently took a trip to [place] where he was drinking more alcohol than usual.  Results for MERCEDEZ CA (MRN [id number]) as of 11/27/2018 10:26   Ref. Range 11/21/2018 07:53   Cholesterol,Tot Latest Ref Range: 100 - 199 mg/dL 153   Triglycerides Latest Ref Range: 0 - 149 mg/dL 296 (H)   HDL Latest Ref Range: >=40 mg/dL 36 (A)   LDL Latest Ref Range: <100 mg/dL 58       HTN (hypertension)  Patient was seen by hypertension specialist and now is on Cartia  mg daily, doxazosin 2 mg daily, eplerenone 50 mg daily, hydrochlorothiazide 25 mg daily and lisinopril 40 mg daily.  Overall he is feeling better, no more anxiety.  Has been walking to keep blood pressure down.         Current medicines (including changes today)  Current Outpatient Prescriptions   Medication Sig Dispense Refill   • Blood Glucose Monitoring Suppl Supplies Misc Test strips order: Test strips for One Touch Ultra 2 meter. Sig: use once daily. Dx: E11.40 100 Each 3   • lisinopril (PRINIVIL, ZESTRIL) 40 MG tablet Take 1 Tab by mouth every day. 90 Tab 3   • hydroCHLOROthiazide (HYDRODIURIL) 25 MG Tab Take 0.5 Tabs by mouth every day. 90 Tab 3   • pravastatin (PRAVACHOL) 40 MG tablet Take 1 Tab by mouth every day. 90 Tab 3   • Eplerenone 50 MG Tab Take 1 Tab by mouth every day. 90 Tab 3   • doxazosin (CARDURA) 2 MG Tab Take 1 Tab by mouth every day. 90 Tab 3   •  "indomethacin (INDOCIN) 50 MG Cap Take 1 Cap by mouth 3 times a day. 90 Cap 5   • escitalopram (LEXAPRO) 5 MG tablet Take 1 Tab by mouth every day. 30 Tab 1   • CARTIA  MG CAPSULE SR 24 HR TAKE 1 CAPSULE BY MOUTH EVERY DAY 90 Cap 3   • JANUMET  MG per tablet TAKE 1 TABLET TWICE DAILY  WITH MEALS 180 Tab 3   • fluticasone (FLONASE) 50 MCG/ACT nasal spray SHAKE LIQUID AND USE 2 SPRAYS IN EACH NOSTRIL EVERY DAY 3 Bottle 3   • Cholecalciferol (VITAMIN D) 2000 UNITS Cap Take 2,000 Units by mouth every day.     • aspirin EC (ECOTRIN) 81 MG Tablet Delayed Response Take 81 mg by mouth every evening.     • Alpha-Lipoic Acid 600 MG CAPS Take 1 Cap by mouth every day.     • cyanocobalamin (VITAMIN B-12) 500 MCG TABS Take 500 mcg by mouth every day.     • docosahexanoic acid (FISH OIL) 1000 MG CAPS Take 1,000 mg by mouth every day.       No current facility-administered medications for this visit.      He  has a past medical history of Diabetes; Dyslipidemia; GERD (gastroesophageal reflux disease); Gout; Hypertension; Obesity; Sleep apnea; Tuberculosis; and Vertigo. He also has no past medical history of Encounter for long-term (current) use of other medications.    ROS   No chest pain, no shortness of breath       Objective:     Blood pressure 130/76, pulse 77, temperature 35.9 °C (96.7 °F), height 1.778 m (5' 10\"), weight 100.7 kg (222 lb), SpO2 98 %. Body mass index is 31.85 kg/m².   Physical Exam:  Constitutional: Alert, no distress.  Skin: Warm, dry, good turgor, no rashes in visible areas.  Eye: Equal, round and reactive, conjunctiva clear, lids normal.  Psych: Alert and oriented x3, normal affect and mood.        Assessment and Plan:   The following treatment plan was discussed    1. Type 2 diabetes mellitus with diabetic neuropathy, without long-term current use of insulin (HCC)  Controlled for his age.  Continue current medication.  Follow-up with labs in 4 months.  - COMP METABOLIC PANEL; Future  - " HEMOGLOBIN A1C; Future  - MICROALBUMIN CREAT RATIO URINE; Future  - TSH WITH REFLEX TO FT4; Future  - Lipid Profile; Future    2. Dyslipidemia  LDL controlled.  Advised patient to modified diet, alcohol intake and exercise to improve triglycerides.  Follow-up with labs in 4 months.  - COMP METABOLIC PANEL; Future  - TSH WITH REFLEX TO FT4; Future  - Lipid Profile; Future    3. Essential hypertension  Controlled.  Continue current medications and following with hypertension specialist.  - CBC WITH DIFFERENTIAL; Future  - COMP METABOLIC PANEL; Future  - TSH WITH REFLEX TO FT4; Future  - Lipid Profile; Future      Followup: Return in about 4 months (around 3/27/2019) for Diabetes.

## 2018-11-27 NOTE — ASSESSMENT & PLAN NOTE
Blood sugars have been 120-170. He takes his medication as prescribed (Janumet  mg twice daily) and eats whatever he wants. Trying to exercise, because his blood pressure goes down with exercise.    Results for TAMARA MERCEDEZ ESCALANTE (MRN [id number]) as of 11/27/2018 10:26   Ref. Range 8/16/2018 08:02 11/21/2018 07:53   Glycohemoglobin Latest Ref Range: 0.0 - 5.6 % 7.6 (H) 7.2 (H)

## 2018-11-27 NOTE — ASSESSMENT & PLAN NOTE
Patient is on pravastatin 40 mg daily.  Patient recently took a trip to [place] where he was drinking more alcohol than usual.  Results for MERCEDEZ MCDONALD (MRN [id number]) as of 11/27/2018 10:26   Ref. Range 11/21/2018 07:53   Cholesterol,Tot Latest Ref Range: 100 - 199 mg/dL 153   Triglycerides Latest Ref Range: 0 - 149 mg/dL 296 (H)   HDL Latest Ref Range: >=40 mg/dL 36 (A)   LDL Latest Ref Range: <100 mg/dL 58

## 2018-12-04 ENCOUNTER — HOSPITAL ENCOUNTER (OUTPATIENT)
Dept: RADIOLOGY | Facility: MEDICAL CENTER | Age: 78
End: 2018-12-04
Attending: INTERNAL MEDICINE
Payer: MEDICARE

## 2018-12-04 ENCOUNTER — HOSPITAL ENCOUNTER (OUTPATIENT)
Dept: CARDIOLOGY | Facility: MEDICAL CENTER | Age: 78
End: 2018-12-04
Attending: INTERNAL MEDICINE
Payer: MEDICARE

## 2018-12-04 DIAGNOSIS — I10 ESSENTIAL (PRIMARY) HYPERTENSION: ICD-10-CM

## 2018-12-04 DIAGNOSIS — I10 ESSENTIAL HYPERTENSION: ICD-10-CM

## 2018-12-04 DIAGNOSIS — I50.810 RIGHT-SIDED HEART FAILURE, UNSPECIFIED HF CHRONICITY (HCC): ICD-10-CM

## 2018-12-04 LAB
LV EJECT FRACT  99904: 65
LV EJECT FRACT MOD 2C 99903: 67.04
LV EJECT FRACT MOD 4C 99902: 65.3
LV EJECT FRACT MOD BP 99901: 66.56

## 2018-12-04 PROCEDURE — 93975 VASCULAR STUDY: CPT | Mod: 26 | Performed by: SURGERY

## 2018-12-04 PROCEDURE — 93975 VASCULAR STUDY: CPT

## 2018-12-04 PROCEDURE — 93306 TTE W/DOPPLER COMPLETE: CPT | Mod: 26 | Performed by: INTERNAL MEDICINE

## 2018-12-04 PROCEDURE — 93306 TTE W/DOPPLER COMPLETE: CPT

## 2018-12-13 ENCOUNTER — OFFICE VISIT (OUTPATIENT)
Dept: VASCULAR LAB | Facility: MEDICAL CENTER | Age: 78
End: 2018-12-13
Attending: INTERNAL MEDICINE
Payer: MEDICARE

## 2018-12-13 VITALS
WEIGHT: 223 LBS | SYSTOLIC BLOOD PRESSURE: 125 MMHG | HEIGHT: 70 IN | DIASTOLIC BLOOD PRESSURE: 58 MMHG | HEART RATE: 98 BPM | BODY MASS INDEX: 31.92 KG/M2

## 2018-12-13 DIAGNOSIS — E78.5 DYSLIPIDEMIA: ICD-10-CM

## 2018-12-13 DIAGNOSIS — I10 ESSENTIAL HYPERTENSION: ICD-10-CM

## 2018-12-13 DIAGNOSIS — E11.9 TYPE 2 DIABETES MELLITUS WITHOUT COMPLICATION, WITHOUT LONG-TERM CURRENT USE OF INSULIN (HCC): ICD-10-CM

## 2018-12-13 PROCEDURE — 99212 OFFICE O/P EST SF 10 MIN: CPT

## 2018-12-13 PROCEDURE — 99214 OFFICE O/P EST MOD 30 MIN: CPT | Performed by: INTERNAL MEDICINE

## 2018-12-13 ASSESSMENT — ENCOUNTER SYMPTOMS
LOSS OF CONSCIOUSNESS: 0
PALPITATIONS: 0
DIZZINESS: 0
NERVOUS/ANXIOUS: 0
CLAUDICATION: 0
SPEECH CHANGE: 0
COUGH: 0
HEADACHES: 0
MYALGIAS: 0
WEIGHT LOSS: 0
SHORTNESS OF BREATH: 0
FOCAL WEAKNESS: 0
DEPRESSION: 0
FALLS: 0
SENSORY CHANGE: 0

## 2018-12-13 NOTE — PROGRESS NOTES
Resistant hypertension Clinic - follow up visit  12-13-18    Assessment / Plan:   1. 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 Work up of Secondary Causes of Resistant Hypertension:   Renovascular HTN: No DAVIDSON on duplex in nov  Primary Aldosteronism: Excluded PRA 17.6 and rebeca 29.3  Thyroid Function: excluded TSH 2.1   Obstructive Sleep Apnea: Present - continue cpap  Pheochromocytoma: plasma mets normal 2018  Intrinsic renal disease - excluded GFR normal and urinalysis normal  Recommendations At This Visit  - No further w/u at this time    3. Medication Use / Adherence:  Assessment: Complete  Recommendations: Instructed to Continue Taking All Medications As Prescribed         4. End Organ Damage:   Left Ventricular Hypertrophy: LVH improving 2018  Albuminuria: Microalbuminuria (A1) in 2018  Renal Function: GFR >60  Established Cardiovascular Disease: None    5. Lifestyle Recommendations:  - decrease sodium  - decrease nsaids  - decrease simple sugars  - continue slow steady weight loss    6. Standard HTN Pharmacotherapy:  ACEI/ARB: continue lisinopril 40 mg daily  Thiazide Type Diuretic: continue hctz to 12.5 mg daily; would not increase given hypok and gout  Calcium Channel Blocker (CCB): continue cartia at current dose; avoid dhp given leg swellling    7. Additional Agents:  Aldosterone antagonist - possible allergy to spironolactone; continue eplerenone 50 mg daily  Beta blocker - tapered off atenolol per my instructoins; continue to hold  Peripheral alpha blocker - continue doxazosin 2 mg each night with extra as needed for 'surges' - increase as needed or tolerated     8. Other CV Risk Factors:   Lipids - LDL and nonHDL with decent control; continue  prava; consider fibrate if trigs worsen  DM - continue janumet for now; consider addition of sglt-2 antagonist if a1c remains >7  Antiplatelet - continue low dose asa given ascvd risk    9. Other Issues:  Gout - decrease hctz as above; limit nsaids; otherwise defer management to pcp  ALEX - continue cpap  Renal cyst - defer any w/u to pcp; consider repeat imaging in future    Studies Ordered At This Visit: none  Blood Work to Be Obtained Prior to Next Visit: per below prior to next visit  Disposition: RTC in 3 months    Diagnosis:  1. Dyslipidemia  COMP METABOLIC PANEL    Lipid Profile   2. Essential hypertension  COMP METABOLIC PANEL    MICROALBUMIN CREAT RATIO URINE   3. Type 2 diabetes mellitus without complication, without long-term current use of insulin (HCC)  MICROALBUMIN CREAT RATIO URINE    HEMOGLOBIN A1C        History of Present Illness:   '  Here for f/u of htn, dyslipidemia and dm    Key HTN History:  Age at diagnosis: 35  Symptoms of High Blood Pressure: None Currently  Has tapered off atenolol  Tolerating all other med changes  Exacerbating Medications or Agents:rare indomethecin for gout  Current Adherence to BP Medications: Complete  Number of HTN crisis / hospitalizations in last year: 0  Home BP Monitoring Results: Mostly 120s/70 and fpa026m/low 80s  24 Hour Ambulatory BP results: Not Performed   Changed to prava - no myalgias  On janumet for diabetes  FS usually around 160s-180s  On asa  ALEX - on cpap feels great  No recent gout  Decreased leg swelling     Social History:     Walks most days  No smoking  Weight stable  Common adult diet     Review of Systems:   Review of Systems   Constitutional: Negative for malaise/fatigue and weight loss.   Respiratory: Negative for cough and shortness of breath.    Cardiovascular: Negative for chest pain, palpitations, claudication and leg swelling.   Musculoskeletal: Negative for falls and myalgias.   Neurological: Negative for dizziness, sensory change,  "speech change, focal weakness, loss of consciousness and headaches.   Psychiatric/Behavioral: Negative for depression. The patient is not nervous/anxious.     - as per my intake form which I reviewed with him and signed     Objective:       Vitals:    12/13/18 1116   BP: 125/58   BP Location: Left arm   Patient Position: Sitting   BP Cuff Size: Adult   Pulse: 98   Weight: 101.2 kg (223 lb)   Height: 1.77 m (5' 9.69\")     Body mass index is 32.29 kg/m².    Physical Exam   Constitutional: He is oriented to person, place, and time. No distress.   HENT:   Head: Normocephalic and atraumatic.   Cardiovascular: Normal rate, regular rhythm, normal heart sounds and intact distal pulses.    No murmur heard.  Pulmonary/Chest: Effort normal and breath sounds normal. No respiratory distress. He has no wheezes. He has no rales.   Musculoskeletal: He exhibits no edema or tenderness.   Neurological: He is alert and oriented to person, place, and time. No cranial nerve deficit. Coordination normal.   Skin: He is not diaphoretic.   Psychiatric: He has a normal mood and affect. His behavior is normal.   Vitals reviewed.       Accessory Clinical Findings:     Lab Results   Component Value Date    CHOLSTRLTOT 153 11/21/2018    LDL 58 11/21/2018    HDL 36 (A) 11/21/2018    TRIGLYCERIDE 296 (H) 11/21/2018      Lab Results   Component Value Date    PROTHROMBTM 12.2 03/08/2017    INR 0.92 03/08/2017       Lab Results   Component Value Date    HBA1C 7.2 (H) 11/21/2018      Lab Results   Component Value Date    SODIUM 137 11/21/2018    POTASSIUM 4.7 11/21/2018    CHLORIDE 103 11/21/2018    CO2 25 11/21/2018    GLUCOSE 131 (H) 11/21/2018    BUN 23 (H) 11/21/2018    CREATININE 1.19 11/21/2018      Lab Results   Component Value Date    ALDOSTERONE 29.3 11/21/2018      Lab Results   Component Value Date    URCREAT 108.60 11/21/2018    MICROALBUR 3.9 11/21/2018    MALBCRT 36 (H) 11/21/2018    METANEPHPL 0.22 11/21/2018     Renal artery duplex dec " 2018:     1.  No evidence of mesenteric arterial stenosis.    2.  No evidence of aortic aneurysm.    3.  No evidence of bilateral renal artery stenosis    4.  Normal sized bilateral kidneys.    5.  Right renal cyst.    Echo dec 2018:  Normal left ventricular size, wall thickness, and systolic function.  No significant valve abnormalities.   Compared to the images of the study done 3/5/14 - there has been a   decrease in LV wall thickness and normalization in left atrial volume.    Michael J Bloch, M.D.     Cc:  CHAD Garcia

## 2019-01-08 ENCOUNTER — OFFICE VISIT (OUTPATIENT)
Dept: MEDICAL GROUP | Facility: MEDICAL CENTER | Age: 79
End: 2019-01-08
Payer: MEDICARE

## 2019-01-08 VITALS
BODY MASS INDEX: 31.21 KG/M2 | WEIGHT: 218 LBS | TEMPERATURE: 96.9 F | DIASTOLIC BLOOD PRESSURE: 72 MMHG | HEART RATE: 121 BPM | SYSTOLIC BLOOD PRESSURE: 148 MMHG | OXYGEN SATURATION: 95 % | HEIGHT: 70 IN

## 2019-01-08 DIAGNOSIS — I10 ESSENTIAL HYPERTENSION: ICD-10-CM

## 2019-01-08 DIAGNOSIS — E11.40 TYPE 2 DIABETES MELLITUS WITH DIABETIC NEUROPATHY, WITHOUT LONG-TERM CURRENT USE OF INSULIN (HCC): ICD-10-CM

## 2019-01-08 DIAGNOSIS — J01.00 ACUTE NON-RECURRENT MAXILLARY SINUSITIS: ICD-10-CM

## 2019-01-08 PROCEDURE — 99214 OFFICE O/P EST MOD 30 MIN: CPT | Performed by: FAMILY MEDICINE

## 2019-01-08 RX ORDER — AMOXICILLIN 875 MG/1
875 TABLET, COATED ORAL 2 TIMES DAILY
Qty: 20 TAB | Refills: 0 | Status: SHIPPED | OUTPATIENT
Start: 2019-01-08 | End: 2019-01-18

## 2019-01-08 RX ORDER — ATENOLOL 50 MG/1
50 TABLET ORAL 2 TIMES DAILY
Qty: 180 TAB | Refills: 3 | COMMUNITY
Start: 2019-01-08 | End: 2019-03-26 | Stop reason: SDUPTHER

## 2019-01-08 ASSESSMENT — PATIENT HEALTH QUESTIONNAIRE - PHQ9
SUM OF ALL RESPONSES TO PHQ QUESTIONS 1-9: 11
5. POOR APPETITE OR OVEREATING: 2 - MORE THAN HALF THE DAYS
CLINICAL INTERPRETATION OF PHQ2 SCORE: 3

## 2019-01-08 NOTE — ASSESSMENT & PLAN NOTE
On metformin 1000 mg twice daily, complains of diarrhea with eating food on occasion. Appetite is decreased.

## 2019-01-08 NOTE — PROGRESS NOTES
Subjective:   Primo Ca is a 78 y.o. male here today for hypertension    HTN (hypertension)  Taking lisinopril 40 mg daily, cartia  mg daily, doxazosin 2 mg daily, Eplerenone 50 mg daily, HCTZ 12.5 mg daily. There have been several medications changes recently. Atenolol was discontinued.  Blood pressure is 100-122/66-75.  Energy level is decreased significantly, cannot walk more than 15 minutes, usually can walk for 45 or more minutes at a time. Appetite is decreased.    Acute non-recurrent maxillary sinusitis  Present for months, but has become worse recently, lots of sinus pressure recently, using CPAP, flonase and sinus rinse. He is feeling chills.    Diabetic neuropathy, type II diabetes mellitus  On metformin 1000 mg twice daily, complains of diarrhea with eating food on occasion. Appetite is decreased.         Current medicines (including changes today)  Current Outpatient Prescriptions   Medication Sig Dispense Refill   • atenolol (TENORMIN) 50 MG Tab Take 1 Tab by mouth 2 times a day. 180 Tab 3   • amoxicillin (AMOXIL) 875 MG tablet Take 1 Tab by mouth 2 times a day for 10 days. 20 Tab 0   • Blood Glucose Monitoring Suppl Supplies Misc Test strips order: Test strips for One Touch Ultra 2 meter. Sig: use once daily. Dx: E11.40 100 Each 3   • lisinopril (PRINIVIL, ZESTRIL) 40 MG tablet Take 1 Tab by mouth every day. 90 Tab 3   • hydroCHLOROthiazide (HYDRODIURIL) 25 MG Tab Take 0.5 Tabs by mouth every day. 90 Tab 3   • pravastatin (PRAVACHOL) 40 MG tablet Take 1 Tab by mouth every day. 90 Tab 3   • Eplerenone 50 MG Tab Take 1 Tab by mouth every day. 90 Tab 3   • doxazosin (CARDURA) 2 MG Tab Take 1 Tab by mouth every day. 90 Tab 3   • indomethacin (INDOCIN) 50 MG Cap Take 1 Cap by mouth 3 times a day. 90 Cap 5   • escitalopram (LEXAPRO) 5 MG tablet Take 1 Tab by mouth every day. 30 Tab 1   • JANUMET  MG per tablet TAKE 1 TABLET TWICE DAILY  WITH MEALS 180 Tab 3   • fluticasone (FLONASE)  "50 MCG/ACT nasal spray SHAKE LIQUID AND USE 2 SPRAYS IN EACH NOSTRIL EVERY DAY 3 Bottle 3   • Cholecalciferol (VITAMIN D) 2000 UNITS Cap Take 2,000 Units by mouth every day.     • aspirin EC (ECOTRIN) 81 MG Tablet Delayed Response Take 81 mg by mouth every evening.     • Alpha-Lipoic Acid 600 MG CAPS Take 1 Cap by mouth every day.     • cyanocobalamin (VITAMIN B-12) 500 MCG TABS Take 500 mcg by mouth every day.     • docosahexanoic acid (FISH OIL) 1000 MG CAPS Take 1,000 mg by mouth every day.       No current facility-administered medications for this visit.      He  has a past medical history of Diabetes; Dyslipidemia; GERD (gastroesophageal reflux disease); Gout; Hypertension; Obesity; Sleep apnea; Tuberculosis; and Vertigo. He also has no past medical history of Encounter for long-term (current) use of other medications.    ROS   No chest pain, no shortness of breath       Objective:     Blood pressure 148/72, pulse (!) 121, temperature 36.1 °C (96.9 °F), height 1.778 m (5' 10\"), weight 98.9 kg (218 lb), SpO2 95 %. Body mass index is 31.28 kg/m².   Physical Exam:  Constitutional: Alert, no distress.  Skin: Warm, dry, good turgor, no rashes in visible areas.  Eye: Equal, round and reactive, conjunctiva clear, lids normal.  Cardiovascular: Positive sinus tachycardia  Psych: Alert and oriented x3, normal affect and mood.        Assessment and Plan:   The following treatment plan was discussed    1. Essential hypertension  Controlled but patient believes that the tachycardia is causing him to be more anxious and fatigued.  He would like to try to go back on atenolol to help with his tachycardia.  We will restart atenolol 50 mg twice daily and discontinue Cartia  mg daily because he is complaining of edema in his lower extremities.  Patient will monitor blood pressure closely and notify us if elevated, if elevated we will revert back to cardia and discontinue atenolol.  We will also order labs to evaluate " fatigue in more detail.  - atenolol (TENORMIN) 50 MG Tab; Take 1 Tab by mouth 2 times a day.  Dispense: 180 Tab; Refill: 3    2. Acute non-recurrent maxillary sinusitis  Prescription for amoxicillin for possible sinus infection.  - amoxicillin (AMOXIL) 875 MG tablet; Take 1 Tab by mouth 2 times a day for 10 days.  Dispense: 20 Tab; Refill: 0    3. Type 2 diabetes mellitus with diabetic neuropathy, without long-term current use of insulin (HCC)  Stable.  Continue to monitor occurrence of diarrhea.  Continue metformin for now.      Followup: Return in about 2 months (around 3/8/2019) for HTN.

## 2019-01-08 NOTE — ASSESSMENT & PLAN NOTE
Present for months, but has become worse recently, lots of sinus pressure recently, using CPAP, flonase and sinus rinse. He is feeling chills.

## 2019-01-08 NOTE — ASSESSMENT & PLAN NOTE
Taking lisinopril 40 mg daily, cartia  mg daily, doxazosin 2 mg daily, Eplerenone 50 mg daily, HCTZ 12.5 mg daily. There have been several medications changes recently. Atenolol was discontinued.  Blood pressure is 100-122/66-75.  Energy level is decreased significantly, cannot walk more than 15 minutes, usually can walk for 45 or more minutes at a time. Appetite is decreased.

## 2019-03-20 ENCOUNTER — HOSPITAL ENCOUNTER (OUTPATIENT)
Dept: LAB | Facility: MEDICAL CENTER | Age: 79
End: 2019-03-20
Attending: FAMILY MEDICINE
Payer: MEDICARE

## 2019-03-20 DIAGNOSIS — E11.40 TYPE 2 DIABETES MELLITUS WITH DIABETIC NEUROPATHY, WITHOUT LONG-TERM CURRENT USE OF INSULIN (HCC): ICD-10-CM

## 2019-03-20 DIAGNOSIS — E78.5 DYSLIPIDEMIA: ICD-10-CM

## 2019-03-20 DIAGNOSIS — I10 ESSENTIAL HYPERTENSION: ICD-10-CM

## 2019-03-20 LAB
ALBUMIN SERPL BCP-MCNC: 3.9 G/DL (ref 3.2–4.9)
ALBUMIN/GLOB SERPL: 1.5 G/DL
ALP SERPL-CCNC: 63 U/L (ref 30–99)
ALT SERPL-CCNC: 6 U/L (ref 2–50)
ANION GAP SERPL CALC-SCNC: 11 MMOL/L (ref 0–11.9)
AST SERPL-CCNC: 9 U/L (ref 12–45)
BASOPHILS # BLD AUTO: 0.8 % (ref 0–1.8)
BASOPHILS # BLD: 0.07 K/UL (ref 0–0.12)
BILIRUB SERPL-MCNC: 0.5 MG/DL (ref 0.1–1.5)
BUN SERPL-MCNC: 15 MG/DL (ref 8–22)
CALCIUM SERPL-MCNC: 8.8 MG/DL (ref 8.5–10.5)
CHLORIDE SERPL-SCNC: 102 MMOL/L (ref 96–112)
CHOLEST SERPL-MCNC: 133 MG/DL (ref 100–199)
CO2 SERPL-SCNC: 25 MMOL/L (ref 20–33)
CREAT SERPL-MCNC: 1.05 MG/DL (ref 0.5–1.4)
CREAT UR-MCNC: 120.7 MG/DL
EOSINOPHIL # BLD AUTO: 0.22 K/UL (ref 0–0.51)
EOSINOPHIL NFR BLD: 2.6 % (ref 0–6.9)
ERYTHROCYTE [DISTWIDTH] IN BLOOD BY AUTOMATED COUNT: 48.3 FL (ref 35.9–50)
FASTING STATUS PATIENT QL REPORTED: NORMAL
GLOBULIN SER CALC-MCNC: 2.6 G/DL (ref 1.9–3.5)
GLUCOSE SERPL-MCNC: 130 MG/DL (ref 65–99)
HCT VFR BLD AUTO: 39.3 % (ref 42–52)
HDLC SERPL-MCNC: 37 MG/DL
HGB BLD-MCNC: 12.5 G/DL (ref 14–18)
IMM GRANULOCYTES # BLD AUTO: 0.03 K/UL (ref 0–0.11)
IMM GRANULOCYTES NFR BLD AUTO: 0.4 % (ref 0–0.9)
LDLC SERPL CALC-MCNC: 67 MG/DL
LYMPHOCYTES # BLD AUTO: 1.6 K/UL (ref 1–4.8)
LYMPHOCYTES NFR BLD: 18.9 % (ref 22–41)
MCH RBC QN AUTO: 28.7 PG (ref 27–33)
MCHC RBC AUTO-ENTMCNC: 31.8 G/DL (ref 33.7–35.3)
MCV RBC AUTO: 90.3 FL (ref 81.4–97.8)
MICROALBUMIN UR-MCNC: 6.5 MG/DL
MICROALBUMIN/CREAT UR: 54 MG/G (ref 0–30)
MONOCYTES # BLD AUTO: 0.95 K/UL (ref 0–0.85)
MONOCYTES NFR BLD AUTO: 11.2 % (ref 0–13.4)
NEUTROPHILS # BLD AUTO: 5.61 K/UL (ref 1.82–7.42)
NEUTROPHILS NFR BLD: 66.1 % (ref 44–72)
NRBC # BLD AUTO: 0 K/UL
NRBC BLD-RTO: 0 /100 WBC
PLATELET # BLD AUTO: 324 K/UL (ref 164–446)
PMV BLD AUTO: 9 FL (ref 9–12.9)
POTASSIUM SERPL-SCNC: 4.5 MMOL/L (ref 3.6–5.5)
PROT SERPL-MCNC: 6.5 G/DL (ref 6–8.2)
RBC # BLD AUTO: 4.35 M/UL (ref 4.7–6.1)
SODIUM SERPL-SCNC: 138 MMOL/L (ref 135–145)
TRIGL SERPL-MCNC: 145 MG/DL (ref 0–149)
TSH SERPL DL<=0.005 MIU/L-ACNC: 1.15 UIU/ML (ref 0.38–5.33)
WBC # BLD AUTO: 8.5 K/UL (ref 4.8–10.8)

## 2019-03-20 PROCEDURE — 82570 ASSAY OF URINE CREATININE: CPT

## 2019-03-20 PROCEDURE — 84443 ASSAY THYROID STIM HORMONE: CPT

## 2019-03-20 PROCEDURE — 83036 HEMOGLOBIN GLYCOSYLATED A1C: CPT | Mod: GA

## 2019-03-20 PROCEDURE — 80061 LIPID PANEL: CPT

## 2019-03-20 PROCEDURE — 82043 UR ALBUMIN QUANTITATIVE: CPT

## 2019-03-20 PROCEDURE — 85025 COMPLETE CBC W/AUTO DIFF WBC: CPT

## 2019-03-20 PROCEDURE — 36415 COLL VENOUS BLD VENIPUNCTURE: CPT

## 2019-03-20 PROCEDURE — 80053 COMPREHEN METABOLIC PANEL: CPT

## 2019-03-21 LAB
EST. AVERAGE GLUCOSE BLD GHB EST-MCNC: 143 MG/DL
HBA1C MFR BLD: 6.6 % (ref 0–5.6)

## 2019-03-26 ENCOUNTER — OFFICE VISIT (OUTPATIENT)
Dept: MEDICAL GROUP | Facility: MEDICAL CENTER | Age: 79
End: 2019-03-26
Payer: MEDICARE

## 2019-03-26 VITALS
SYSTOLIC BLOOD PRESSURE: 130 MMHG | WEIGHT: 225 LBS | DIASTOLIC BLOOD PRESSURE: 76 MMHG | HEIGHT: 70 IN | BODY MASS INDEX: 32.21 KG/M2 | OXYGEN SATURATION: 95 % | HEART RATE: 75 BPM | TEMPERATURE: 97.7 F

## 2019-03-26 DIAGNOSIS — E78.5 DYSLIPIDEMIA: ICD-10-CM

## 2019-03-26 DIAGNOSIS — I10 ESSENTIAL HYPERTENSION: ICD-10-CM

## 2019-03-26 DIAGNOSIS — E11.40 TYPE 2 DIABETES MELLITUS WITH DIABETIC NEUROPATHY, WITHOUT LONG-TERM CURRENT USE OF INSULIN (HCC): ICD-10-CM

## 2019-03-26 PROBLEM — J01.00 ACUTE NON-RECURRENT MAXILLARY SINUSITIS: Status: RESOLVED | Noted: 2019-01-08 | Resolved: 2019-03-26

## 2019-03-26 PROCEDURE — 99214 OFFICE O/P EST MOD 30 MIN: CPT | Performed by: FAMILY MEDICINE

## 2019-03-26 RX ORDER — ATENOLOL 25 MG/1
25 TABLET ORAL 2 TIMES DAILY
Qty: 180 TAB | Refills: 3 | Status: SHIPPED | OUTPATIENT
Start: 2019-03-26 | End: 2019-11-12 | Stop reason: SDUPTHER

## 2019-03-26 NOTE — ASSESSMENT & PLAN NOTE
Patient is tolerating Janumet  mg twice daily.  He has an episode of diarrhea about 3 times per month, not affecting his life.  Patient's A1c improved from 7.2 down to 6.6.  He denies any changes in his neuropathy.

## 2019-03-26 NOTE — ASSESSMENT & PLAN NOTE
Patient is tolerating pravastatin 40 mg daily.  We reviewed his lab work which shows a great improvement in his triglycerides.  Results for MERCEDEZ MCDONALD (MRN [id number]) as of 3/26/2019 10:17   Ref. Range 11/21/2018 07:53 3/20/2019 07:33   Cholesterol,Tot Latest Ref Range: 100 - 199 mg/dL 153 133   Triglycerides Latest Ref Range: 0 - 149 mg/dL 296 (H) 145   HDL Latest Ref Range: >=40 mg/dL 36 (A) 37 (A)   LDL Latest Ref Range: <100 mg/dL 58 67

## 2019-03-26 NOTE — PROGRESS NOTES
Subjective:   Mercedez Ca is a 78 y.o. male here today for diabetes    Diabetic neuropathy, type II diabetes mellitus  Patient is tolerating Janumet  mg twice daily.  He has an episode of diarrhea about 3 times per month, not affecting his life.  Patient's A1c improved from 7.2 down to 6.6.  He denies any changes in his neuropathy.    Dyslipidemia  Patient is tolerating pravastatin 40 mg daily.  We reviewed his lab work which shows a great improvement in his triglycerides.  Results for MERCEDEZ CA (MRN [id number]) as of 3/26/2019 10:17   Ref. Range 11/21/2018 07:53 3/20/2019 07:33   Cholesterol,Tot Latest Ref Range: 100 - 199 mg/dL 153 133   Triglycerides Latest Ref Range: 0 - 149 mg/dL 296 (H) 145   HDL Latest Ref Range: >=40 mg/dL 36 (A) 37 (A)   LDL Latest Ref Range: <100 mg/dL 58 67       HTN (hypertension)  We discontinued cardia at his last visit, he no longer has edema.  We started atenolol at his last visit and he has no longer had a heart palpitations or racing heart.  Patient has been tracking his blood pressure and his numbers have been controlled over the last 3 months.         Current medicines (including changes today)  Current Outpatient Prescriptions   Medication Sig Dispense Refill   • atenolol (TENORMIN) 25 MG Tab Take 1 Tab by mouth 2 times a day. 180 Tab 3   • Blood Glucose Monitoring Suppl Supplies Misc Test strips order: Test strips for One Touch Ultra 2 meter. Sig: use once daily. Dx: E11.40 100 Each 3   • lisinopril (PRINIVIL, ZESTRIL) 40 MG tablet Take 1 Tab by mouth every day. 90 Tab 3   • hydroCHLOROthiazide (HYDRODIURIL) 25 MG Tab Take 0.5 Tabs by mouth every day. 90 Tab 3   • pravastatin (PRAVACHOL) 40 MG tablet Take 1 Tab by mouth every day. 90 Tab 3   • Eplerenone 50 MG Tab Take 1 Tab by mouth every day. 90 Tab 3   • doxazosin (CARDURA) 2 MG Tab Take 1 Tab by mouth every day. 90 Tab 3   • indomethacin (INDOCIN) 50 MG Cap Take 1 Cap by mouth 3 times a day. 90  "Cap 5   • escitalopram (LEXAPRO) 5 MG tablet Take 1 Tab by mouth every day. 30 Tab 1   • JANUMET  MG per tablet TAKE 1 TABLET TWICE DAILY  WITH MEALS 180 Tab 3   • fluticasone (FLONASE) 50 MCG/ACT nasal spray SHAKE LIQUID AND USE 2 SPRAYS IN EACH NOSTRIL EVERY DAY 3 Bottle 3   • Cholecalciferol (VITAMIN D) 2000 UNITS Cap Take 2,000 Units by mouth every day.     • aspirin EC (ECOTRIN) 81 MG Tablet Delayed Response Take 81 mg by mouth every evening.     • Alpha-Lipoic Acid 600 MG CAPS Take 1 Cap by mouth every day.     • cyanocobalamin (VITAMIN B-12) 500 MCG TABS Take 500 mcg by mouth every day.     • docosahexanoic acid (FISH OIL) 1000 MG CAPS Take 1,000 mg by mouth every day.       No current facility-administered medications for this visit.      He  has a past medical history of Diabetes; Dyslipidemia; GERD (gastroesophageal reflux disease); Gout; Hypertension; Obesity; Sleep apnea; Tuberculosis; and Vertigo. He also has no past medical history of Encounter for long-term (current) use of other medications.    ROS   No chest pain, no shortness of breath       Objective:     Blood pressure 130/76, pulse 75, temperature 36.5 °C (97.7 °F), height 1.778 m (5' 10\"), weight 102.1 kg (225 lb), SpO2 95 %. Body mass index is 32.28 kg/m².   Physical Exam:  Constitutional: Alert, no distress.  Skin: Warm, dry, good turgor, no rashes in visible areas.  Eye: Equal, round and reactive, conjunctiva clear, lids normal.  Respiratory: Unlabored respiratory effort, lungs clear to auscultation, no wheezes, no ronchi.  Cardiovascular: Normal S1, S2, no murmur, no edema.  Psych: Alert and oriented x3, normal affect and mood.        Assessment and Plan:   The following treatment plan was discussed    1. Type 2 diabetes mellitus with diabetic neuropathy, without long-term current use of insulin (HCC)  Controlled.  Continue Janumet.  Follow-up with labs in 3 months.  - HEMOGLOBIN A1C; Future  - Comp Metabolic Panel; Future    2. " Dyslipidemia  Controlled.  Continue pravastatin.  Follow-up with labs in 3 months.  - Comp Metabolic Panel; Future  - Lipid Profile; Future    3. Essential hypertension  Controlled.  Continue atenolol 25 mg twice daily, hydrochlorothiazide 12.5 mg daily, eplerenone 50 mg daily and lisinopril 40 mg daily.  Follow-up in 3 months with blood pressure log.  Message with any concerns.  - CBC WITH DIFFERENTIAL; Future  - atenolol (TENORMIN) 25 MG Tab; Take 1 Tab by mouth 2 times a day.  Dispense: 180 Tab; Refill: 3      Followup: Return in about 3 months (around 6/26/2019) for Labs, Diabetes, HTN.

## 2019-03-26 NOTE — ASSESSMENT & PLAN NOTE
We discontinued cardia at his last visit, he no longer has edema.  We started atenolol at his last visit and he has no longer had a heart palpitations or racing heart.  Patient has been tracking his blood pressure and his numbers have been controlled over the last 3 months.

## 2019-04-04 ENCOUNTER — PATIENT MESSAGE (OUTPATIENT)
Dept: MEDICAL GROUP | Facility: MEDICAL CENTER | Age: 79
End: 2019-04-04

## 2019-04-04 DIAGNOSIS — H91.91 DECREASED HEARING OF RIGHT EAR: ICD-10-CM

## 2019-04-05 NOTE — TELEPHONE ENCOUNTER
From: Primo Ca  To: Jefferson Garcia M.D.  Sent: 4/4/2019 6:31 PM PDT  Subject: Non-Urgent Medical Question    Can you please refer me for a hearing appointment with Dr Levine. It's been a while and I might need to replace the one I have. Thanks.

## 2019-04-11 ENCOUNTER — OFFICE VISIT (OUTPATIENT)
Dept: VASCULAR LAB | Facility: MEDICAL CENTER | Age: 79
End: 2019-04-11
Attending: INTERNAL MEDICINE
Payer: MEDICARE

## 2019-04-11 VITALS
DIASTOLIC BLOOD PRESSURE: 81 MMHG | HEART RATE: 66 BPM | WEIGHT: 224 LBS | BODY MASS INDEX: 32.07 KG/M2 | SYSTOLIC BLOOD PRESSURE: 178 MMHG | HEIGHT: 70 IN

## 2019-04-11 DIAGNOSIS — R03.0 WHITE COAT SYNDROME WITH HIGH BLOOD PRESSURE BUT WITHOUT HYPERTENSION: ICD-10-CM

## 2019-04-11 DIAGNOSIS — E11.9 TYPE 2 DIABETES MELLITUS WITHOUT COMPLICATION, WITHOUT LONG-TERM CURRENT USE OF INSULIN (HCC): ICD-10-CM

## 2019-04-11 DIAGNOSIS — G47.33 OSA ON CPAP: ICD-10-CM

## 2019-04-11 DIAGNOSIS — E78.5 DYSLIPIDEMIA: ICD-10-CM

## 2019-04-11 PROCEDURE — 99212 OFFICE O/P EST SF 10 MIN: CPT

## 2019-04-11 PROCEDURE — 99214 OFFICE O/P EST MOD 30 MIN: CPT | Performed by: INTERNAL MEDICINE

## 2019-04-11 RX ORDER — EPLERENONE 50 MG/1
50 TABLET, FILM COATED ORAL 2 TIMES DAILY
Qty: 60 TAB | Refills: 11 | Status: SHIPPED | OUTPATIENT
Start: 2019-04-11 | End: 2019-07-15 | Stop reason: SDUPTHER

## 2019-04-11 ASSESSMENT — ENCOUNTER SYMPTOMS
SENSORY CHANGE: 0
DIZZINESS: 0
MYALGIAS: 0
SPEECH CHANGE: 0
DEPRESSION: 0
PALPITATIONS: 0
LOSS OF CONSCIOUSNESS: 0
NERVOUS/ANXIOUS: 0
COUGH: 0
CLAUDICATION: 0
FALLS: 0
FOCAL WEAKNESS: 0
WEIGHT LOSS: 0
HEADACHES: 0
SHORTNESS OF BREATH: 0

## 2019-04-11 NOTE — PROGRESS NOTES
Resistant hypertension Clinic - follow up visit  4-11-19    Assessment / Plan:   1. 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 Work up of Secondary Causes of Resistant Hypertension:   Renovascular HTN: No DAVIDSON on duplex in nov  Primary Aldosteronism: Excluded PRA 17.6 and rebeca 29.3  Thyroid Function: excluded TSH 2.1   Obstructive Sleep Apnea: Present - continue cpap  Pheochromocytoma: plasma mets normal 2018  Intrinsic renal disease - excluded GFR normal and urinalysis normal  Recommendations At This Visit  - No further w/u at this time  - consider repeat rebeca/PRA in future    3. Medication Use / Adherence:  Assessment: Complete  Recommendations: Instructed to Continue Taking All Medications As Prescribed         4. End Organ Damage:   Left Ventricular Hypertrophy: LVH improving 2018  Albuminuria: Microalbuminuria (A1) in 2018  Renal Function: GFR >60  Established Cardiovascular Disease: None    5. Lifestyle Recommendations:  - decrease sodium  - decrease nsaids  - decrease simple sugars  - continue slow steady weight loss    6. Standard HTN Pharmacotherapy:  ACEI/ARB: continue lisinopril 40 mg daily  Thiazide Type Diuretic: stop HCT given gout recurrence even on low dose  Calcium Channel Blocker (CCB): avoid dhp given leg swellling; avoid nonDHP while on beta blocker    7. Additional Agents:  Aldosterone antagonist - possible allergy to spironolactone; increase eplerenone to 50 bid.   Beta blocker - unable to taper off due to rebound HR; continue atenolol 25 bid as prescribed by pcp  Peripheral alpha blocker - continue doxazosin 2 mg each night with extra as needed for 'surges' -  increase as needed or tolerated     8. Other CV Risk Factors:   Lipids - LDL and nonHDL with decent control; continue prava; consider fibrate if trigs worsen  DM - reasonable contro; continue janumet for now; consider addition of sglt-2 antagonist if a1c becomes >7  Antiplatelet - continue low dose asa given ascvd risk    9. Other Issues:  Gout - stop hctz as above; limit nsaids; otherwise defer management to pcp  ALEX - continue cpap  Renal cyst - defer any w/u to pcp; consider repeat imaging in future    Studies Ordered At This Visit: abpm  Blood Work to Be Obtained Prior to Next Visit: none  Disposition: RTC in 2-3 months    Diagnosis:  1. Dyslipidemia     2. Type 2 diabetes mellitus without complication, without long-term current use of insulin (HCC)     3. White coat syndrome with high blood pressure but without hypertension  REFERRAL TO 24-HOUR BLOOD PRESSURE MONITORING   4. ALEX on CPAP          History of Present Illness:   '  Here for f/u of htn, dyslipidemia and dm    Key HTN History:  Age at diagnosis: 35  Symptoms of High Blood Pressure: None Currently   PCP increased hctz to 25 mg daily and restarted atenolol 25 mg bid due to more leg swelling and higher bp, and higher HR  Has taken indomethecin 3x since increasing hct  Tolerating all other med changes  Exacerbating Medications or Agents:rare indomethecin for gout  Current Adherence to BP Medications: Complete  Number of HTN crisis / hospitalizations in last year: 0  Home BP Monitoring Results: Mostly 120s/70 and pte950l/60s-70s  24 Hour Ambulatory BP results: Not Performed   Changed to prava - no myalgias  On janumet for diabetes  FS usually around 140-170  On asa  ALEX - on cpap feels great     Social History:     Walks most days  No smoking  Weight stable  Common adult diet     Review of Systems:   Review of Systems   Constitutional: Negative for malaise/fatigue and weight loss.   Respiratory: Negative for cough and shortness of breath.   "  Cardiovascular: Negative for chest pain, palpitations, claudication and leg swelling.   Musculoskeletal: Negative for falls and myalgias.   Neurological: Negative for dizziness, sensory change, speech change, focal weakness, loss of consciousness and headaches.   Psychiatric/Behavioral: Negative for depression. The patient is not nervous/anxious.       Objective:       Vitals:    04/11/19 1114 04/11/19 1118   BP: (!) 180/80 (!) 178/81   BP Location: Left arm Left arm   Patient Position: Sitting Sitting   BP Cuff Size: Adult Adult   Pulse: 64 66   Weight: 101.6 kg (224 lb)    Height: 1.77 m (5' 9.69\")      Body mass index is 32.43 kg/m².    Physical Exam   Constitutional: He is oriented to person, place, and time. No distress.   HENT:   Head: Normocephalic and atraumatic.   Cardiovascular: Normal rate, regular rhythm, normal heart sounds and intact distal pulses.    No murmur heard.  Pulmonary/Chest: Effort normal and breath sounds normal. No respiratory distress. He has no wheezes. He has no rales.   Musculoskeletal: He exhibits no edema or tenderness.   Neurological: He is alert and oriented to person, place, and time. No cranial nerve deficit. Coordination normal.   Skin: He is not diaphoretic.   Psychiatric: He has a normal mood and affect. His behavior is normal.   Vitals reviewed.       Accessory Clinical Findings:     Lab Results   Component Value Date    CHOLSTRLTOT 133 03/20/2019    LDL 67 03/20/2019    HDL 37 (A) 03/20/2019    TRIGLYCERIDE 145 03/20/2019           Lab Results   Component Value Date    HBA1C 6.6 (H) 03/20/2019      Lab Results   Component Value Date    SODIUM 138 03/20/2019    POTASSIUM 4.5 03/20/2019    CHLORIDE 102 03/20/2019    CO2 25 03/20/2019    GLUCOSE 130 (H) 03/20/2019    BUN 15 03/20/2019    CREATININE 1.05 03/20/2019      Lab Results   Component Value Date    ALDOSTERONE 29.3 11/21/2018      Lab Results   Component Value Date    URCREAT 120.70 03/20/2019    MICROALBUR 6.5 " 03/20/2019    [facility]    METANEPHPL 0.22 11/21/2018     Renal artery duplex dec 2018:     1.  No evidence of mesenteric arterial stenosis.    2.  No evidence of aortic aneurysm.    3.  No evidence of bilateral renal artery stenosis    4.  Normal sized bilateral kidneys.    5.  Right renal cyst.    Echo dec 2018:  Normal left ventricular size, wall thickness, and systolic function.  No significant valve abnormalities.   Compared to the images of the study done 3/5/14 - there has been a   decrease in LV wall thickness and normalization in left atrial volume.    Michael J Bloch, M.D.     Cc:  CHAD Garcia

## 2019-04-11 NOTE — PATIENT INSTRUCTIONS
Increase eplerenone to 50 mg 2x daily    Stop hydrochlorothiazide    We will call you about setting up 24 hour BP monitor    Follow Up:  Adriane 10 at 420    Michael Bloch

## 2019-04-19 ENCOUNTER — TELEPHONE (OUTPATIENT)
Dept: VASCULAR LAB | Facility: MEDICAL CENTER | Age: 79
End: 2019-04-19

## 2019-04-19 NOTE — TELEPHONE ENCOUNTER
Patient called in stating that he has been feeling dizzy and has had diarrhea on and off since changing the dose of Eplerenone and d/cing hctz. Patient also states the Dr. Bloch wanted him to try and stop taking Indomethacine, but has taken a couple in the last week and felt relief.   Spoke with James Lomeli PharmD, patient is to go back to regular dose of Eplerenone (1 po q day), stay off hctz, and only use Indomethacine prn.  Patient states understanding and is in agreement with plan. Patient will call us back if there are any other issue.

## 2019-05-15 ENCOUNTER — APPOINTMENT (RX ONLY)
Dept: URBAN - METROPOLITAN AREA CLINIC 4 | Facility: CLINIC | Age: 79
Setting detail: DERMATOLOGY
End: 2019-05-15

## 2019-05-15 ENCOUNTER — NON-PROVIDER VISIT (OUTPATIENT)
Dept: VASCULAR LAB | Facility: MEDICAL CENTER | Age: 79
End: 2019-05-15
Attending: INTERNAL MEDICINE
Payer: MEDICARE

## 2019-05-15 DIAGNOSIS — Z85.828 PERSONAL HISTORY OF OTHER MALIGNANT NEOPLASM OF SKIN: ICD-10-CM

## 2019-05-15 DIAGNOSIS — L82.1 OTHER SEBORRHEIC KERATOSIS: ICD-10-CM

## 2019-05-15 DIAGNOSIS — L57.0 ACTINIC KERATOSIS: ICD-10-CM

## 2019-05-15 DIAGNOSIS — I10 ESSENTIAL HYPERTENSION: ICD-10-CM

## 2019-05-15 DIAGNOSIS — D22 MELANOCYTIC NEVI: ICD-10-CM

## 2019-05-15 DIAGNOSIS — L82.0 INFLAMED SEBORRHEIC KERATOSIS: ICD-10-CM

## 2019-05-15 DIAGNOSIS — D18.0 HEMANGIOMA: ICD-10-CM

## 2019-05-15 DIAGNOSIS — L57.8 OTHER SKIN CHANGES DUE TO CHRONIC EXPOSURE TO NONIONIZING RADIATION: ICD-10-CM

## 2019-05-15 DIAGNOSIS — L81.4 OTHER MELANIN HYPERPIGMENTATION: ICD-10-CM

## 2019-05-15 PROBLEM — D48.5 NEOPLASM OF UNCERTAIN BEHAVIOR OF SKIN: Status: ACTIVE | Noted: 2019-05-15

## 2019-05-15 PROBLEM — D18.01 HEMANGIOMA OF SKIN AND SUBCUTANEOUS TISSUE: Status: ACTIVE | Noted: 2019-05-15

## 2019-05-15 PROBLEM — D22.5 MELANOCYTIC NEVI OF TRUNK: Status: ACTIVE | Noted: 2019-05-15

## 2019-05-15 PROCEDURE — 93790 AMBL BP MNTR W/SW I&R: CPT | Performed by: INTERNAL MEDICINE

## 2019-05-15 PROCEDURE — ? COUNSELING

## 2019-05-15 PROCEDURE — ? PRESCRIPTION

## 2019-05-15 PROCEDURE — 69100 BIOPSY OF EXTERNAL EAR: CPT

## 2019-05-15 PROCEDURE — 93788 AMBL BP MNTR W/SW A/R: CPT

## 2019-05-15 PROCEDURE — 99214 OFFICE O/P EST MOD 30 MIN: CPT | Mod: 25

## 2019-05-15 PROCEDURE — 93786 AMBL BP MNTR W/SW REC ONLY: CPT

## 2019-05-15 PROCEDURE — ? BIOPSY BY SHAVE METHOD

## 2019-05-15 PROCEDURE — 17110 DESTRUCTION B9 LES UP TO 14: CPT | Mod: 59

## 2019-05-15 PROCEDURE — 17004 DESTROY PREMAL LESIONS 15/>: CPT

## 2019-05-15 PROCEDURE — ? LIQUID NITROGEN

## 2019-05-15 RX ORDER — IMIQUIMOD 50 MG/G
CREAM TOPICAL
Qty: 1 | Refills: 3 | Status: ERX | COMMUNITY
Start: 2019-05-15

## 2019-05-15 RX ADMIN — IMIQUIMOD: 50 CREAM TOPICAL at 16:12

## 2019-05-15 ASSESSMENT — LOCATION DETAILED DESCRIPTION DERM
LOCATION DETAILED: RIGHT INFERIOR FOREHEAD
LOCATION DETAILED: LEFT CLAVICULAR NECK
LOCATION DETAILED: RIGHT SUPERIOR CENTRAL MALAR CHEEK
LOCATION DETAILED: LEFT INFERIOR LATERAL FOREHEAD
LOCATION DETAILED: LEFT SUPERIOR LATERAL MALAR CHEEK
LOCATION DETAILED: LEFT SUPERIOR UPPER BACK
LOCATION DETAILED: INFERIOR THORACIC SPINE
LOCATION DETAILED: LEFT PROXIMAL DORSAL FOREARM
LOCATION DETAILED: RIGHT RADIAL DORSAL HAND
LOCATION DETAILED: RIGHT SUPERIOR UPPER BACK
LOCATION DETAILED: RIGHT DORSAL WRIST
LOCATION DETAILED: LEFT LATERAL FOREHEAD
LOCATION DETAILED: LEFT SUPERIOR LATERAL FOREHEAD
LOCATION DETAILED: LEFT SUPERIOR HELIX
LOCATION DETAILED: SUPERIOR MID FOREHEAD
LOCATION DETAILED: LEFT MID-UPPER BACK
LOCATION DETAILED: LEFT LATERAL MALAR CHEEK
LOCATION DETAILED: RIGHT ULNAR DORSAL HAND
LOCATION DETAILED: RIGHT INFERIOR ANTERIOR NECK
LOCATION DETAILED: RIGHT INFERIOR TEMPLE
LOCATION DETAILED: LEFT FOREHEAD
LOCATION DETAILED: RIGHT MID TEMPLE
LOCATION DETAILED: LEFT DISTAL DORSAL FOREARM
LOCATION DETAILED: LEFT INFERIOR MEDIAL MIDBACK
LOCATION DETAILED: RIGHT LATERAL FOREHEAD
LOCATION DETAILED: RIGHT INFERIOR MEDIAL UPPER BACK
LOCATION DETAILED: LEFT RADIAL DORSAL HAND
LOCATION DETAILED: LEFT CENTRAL FRONTAL SCALP
LOCATION DETAILED: RIGHT INFERIOR CENTRAL MALAR CHEEK

## 2019-05-15 ASSESSMENT — LOCATION SIMPLE DESCRIPTION DERM
LOCATION SIMPLE: RIGHT FOREHEAD
LOCATION SIMPLE: LEFT FOREARM
LOCATION SIMPLE: RIGHT ANTERIOR NECK
LOCATION SIMPLE: LEFT FOREHEAD
LOCATION SIMPLE: LEFT ANTERIOR NECK
LOCATION SIMPLE: SUPERIOR FOREHEAD
LOCATION SIMPLE: UPPER BACK
LOCATION SIMPLE: RIGHT WRIST
LOCATION SIMPLE: LEFT UPPER BACK
LOCATION SIMPLE: LEFT LOWER BACK
LOCATION SIMPLE: LEFT SCALP
LOCATION SIMPLE: LEFT CHEEK
LOCATION SIMPLE: RIGHT HAND
LOCATION SIMPLE: LEFT HAND
LOCATION SIMPLE: RIGHT CHEEK
LOCATION SIMPLE: RIGHT TEMPLE
LOCATION SIMPLE: RIGHT UPPER BACK
LOCATION SIMPLE: LEFT EAR

## 2019-05-15 ASSESSMENT — LOCATION ZONE DERM
LOCATION ZONE: FACE
LOCATION ZONE: SCALP
LOCATION ZONE: NECK
LOCATION ZONE: ARM
LOCATION ZONE: HAND
LOCATION ZONE: TRUNK
LOCATION ZONE: EAR

## 2019-05-15 NOTE — PROCEDURE: BIOPSY BY SHAVE METHOD
X Size Of Lesion In Cm: 0
Bill [id number] For Specimen Handling/Conveyance To Laboratory?: no
Biopsy Type: H and E
Electrodesiccation Text: The wound bed was treated with electrodesiccation after the biopsy was performed.
Depth Of Biopsy: dermis
Electrodesiccation And Curettage Text: The wound bed was treated with electrodesiccation and curettage after the biopsy was performed.
Type Of Destruction Used: Electrodesiccation
Billing Type: Third-Party Bill
Dressing: bandage
Anesthesia Volume In Cc: 0.5
Was A Bandage Applied: Yes
Post-Care Instructions: I reviewed with the patient in detail post-care instructions. Patient is to keep the biopsy site dry overnight, and then apply bacitracin twice daily until healed. Patient may apply hydrogen peroxide soaks to remove any crusting.
Silver Nitrate Text: The wound bed was treated with silver nitrate after the biopsy was performed.
Lab: 253
Detail Level: Detailed
Biopsy Method: Dermablade
Curettage Text: The wound bed was treated with curettage after the biopsy was performed.
Notification Instructions: Patient will be notified of biopsy results. However, patient instructed to call the office if not contacted within 2 weeks.
Hemostasis: Drysol
Consent: Written consent was obtained and risks were reviewed including but not limited to scarring, infection, bleeding, scabbing, incomplete removal, nerve damage and allergy to anesthesia.
Anesthesia Type: 1% lidocaine with epinephrine
Lab Facility: 
Cryotherapy Text: The wound bed was treated with cryotherapy after the biopsy was performed.
Wound Care: Bacitracin

## 2019-05-15 NOTE — PROCEDURE: LIQUID NITROGEN
Aperture Size (Optional): C
Render Note In Bullet Format When Appropriate: No
Duration Of Freeze Thaw-Cycle (Seconds): 3
Post-Care Instructions: I reviewed with the patient in detail post-care instructions. Patient is to wear sunprotection, and avoid picking at any of the treated lesions. Pt may apply Vaseline to crusted or scabbing areas.
Number Of Freeze-Thaw Cycles: 2 freeze-thaw cycles
Detail Level: Simple
Consent: The patient's consent was obtained including but not limited to risks of crusting, scabbing, blistering, scarring, darker or lighter pigmentary change, recurrence, incomplete removal and infection.
Number Of Freeze-Thaw Cycles: 1 freeze-thaw cycle
Medical Necessity Information: It is in your best interest to select a reason for this procedure from the list below. All of these items fulfill various CMS LCD requirements except the new and changing color options.
Medical Necessity Clause: This procedure was medically necessary because the lesions that were treated were:
Detail Level: Detailed

## 2019-05-16 ENCOUNTER — APPOINTMENT (OUTPATIENT)
Dept: VASCULAR LAB | Facility: MEDICAL CENTER | Age: 79
End: 2019-05-16
Attending: INTERNAL MEDICINE
Payer: MEDICARE

## 2019-05-16 NOTE — PROGRESS NOTES
Ambulatory blood pressure monitor results reviewed  Full report under media tab  Reasonable data acquisition    Mean daytime: 147/72    Nocturnal dip: Present    Clinical correlation needed.  We will discuss options at follow-up visit    Michael Bloch, MD  [facility]

## 2019-05-20 ENCOUNTER — TELEPHONE (OUTPATIENT)
Dept: VASCULAR LAB | Facility: MEDICAL CENTER | Age: 79
End: 2019-05-20

## 2019-05-20 NOTE — TELEPHONE ENCOUNTER
Called patient to inform of ABPM results. Explained that average was 147/72 which is a bit higher than Dr. Bloch would like. Instructed patient to continue with current medications and options would be discussed during follow-up on 7/22.    William Whitmore. [facility]

## 2019-05-21 ENCOUNTER — APPOINTMENT (RX ONLY)
Dept: URBAN - METROPOLITAN AREA CLINIC 4 | Facility: CLINIC | Age: 79
Setting detail: DERMATOLOGY
End: 2019-05-21

## 2019-05-21 PROBLEM — C44.222 SQUAMOUS CELL CARCINOMA OF SKIN OF RIGHT EAR AND EXTERNAL AURICULAR CANAL: Status: ACTIVE | Noted: 2019-05-21

## 2019-05-21 PROCEDURE — ? MOHS SURGERY PHONE CONSULTATION

## 2019-05-21 NOTE — PROCEDURE: MOHS SURGERY PHONE CONSULTATION
Date Of Mohs Surgery: 06/06/2019
Detail Level: Simple
Does The Patient Take Antibiotics Prior To Dental Procedures?: No
Office Location Of Mohs Surgery: [address]
Which Antibiotic Do They Take For Surgical Prophylaxis?: Amoxicillin (2 grams)
Pathology Accession #: [id number]
Does The Patient Take Blood Thinners?: Yes
If Yes- What Blood Thinners Do They Take?: 81mg
Referring Provider: Brock
Time Of Mohs Surgery: 8:15am
Patient Reported Location: [facility]
Patient's Insurance:

## 2019-05-30 ENCOUNTER — PATIENT MESSAGE (OUTPATIENT)
Dept: MEDICAL GROUP | Facility: MEDICAL CENTER | Age: 79
End: 2019-05-30

## 2019-05-30 DIAGNOSIS — R97.20 ELEVATED PSA, LESS THAN 10 NG/ML: ICD-10-CM

## 2019-05-30 DIAGNOSIS — M1A.0790 CHRONIC IDIOPATHIC GOUT INVOLVING TOE WITHOUT TOPHUS, UNSPECIFIED LATERALITY: ICD-10-CM

## 2019-05-31 NOTE — TELEPHONE ENCOUNTER
From: Primo Ca  To: Jefferson Garcia M.D.  Sent: 5/30/2019 9:48 PM PDT  Subject: Procedure Question    If I will be doing blood work for upcoming visit and would like to add Euric Acid level, and also Prostrate I think is due maybe. Can you add to tests at [facility] site I will be taking with Renounce. Thanks.Alvarado

## 2019-06-03 RX ORDER — SITAGLIPTIN AND METFORMIN HYDROCHLORIDE 1000; 50 MG/1; MG/1
TABLET, FILM COATED ORAL
Qty: 180 TAB | Refills: 3 | Status: SHIPPED | OUTPATIENT
Start: 2019-06-03

## 2019-06-06 ENCOUNTER — APPOINTMENT (RX ONLY)
Dept: URBAN - METROPOLITAN AREA CLINIC 36 | Facility: CLINIC | Age: 79
Setting detail: DERMATOLOGY
End: 2019-06-06

## 2019-06-06 VITALS — DIASTOLIC BLOOD PRESSURE: 93 MMHG | SYSTOLIC BLOOD PRESSURE: 165 MMHG

## 2019-06-06 DIAGNOSIS — L57.8 OTHER SKIN CHANGES DUE TO CHRONIC EXPOSURE TO NONIONIZING RADIATION: ICD-10-CM

## 2019-06-06 DIAGNOSIS — L91.8 OTHER HYPERTROPHIC DISORDERS OF THE SKIN: ICD-10-CM

## 2019-06-06 PROBLEM — C44.222 SQUAMOUS CELL CARCINOMA OF SKIN OF RIGHT EAR AND EXTERNAL AURICULAR CANAL: Status: ACTIVE | Noted: 2019-06-06

## 2019-06-06 PROCEDURE — ? LIQUID NITROGEN

## 2019-06-06 PROCEDURE — ? MOHS SURGERY

## 2019-06-06 PROCEDURE — 17311 MOHS 1 STAGE H/N/HF/G: CPT

## 2019-06-06 PROCEDURE — 17312 MOHS ADDL STAGE: CPT

## 2019-06-06 PROCEDURE — ? COUNSELING

## 2019-06-06 PROCEDURE — ? PRESCRIPTION

## 2019-06-06 PROCEDURE — 17110 DESTRUCTION B9 LES UP TO 14: CPT

## 2019-06-06 PROCEDURE — 14060 TIS TRNFR E/N/E/L 10 SQ CM/<: CPT

## 2019-06-06 RX ORDER — DOXYCYCLINE 100 MG/1
CAPSULE ORAL
Qty: 20 | Refills: 0 | Status: ERX | COMMUNITY
Start: 2019-06-06

## 2019-06-06 RX ADMIN — DOXYCYCLINE: 100 CAPSULE ORAL at 19:35

## 2019-06-06 ASSESSMENT — LOCATION ZONE DERM: LOCATION ZONE: NECK

## 2019-06-06 ASSESSMENT — LOCATION SIMPLE DESCRIPTION DERM: LOCATION SIMPLE: NECK

## 2019-06-06 ASSESSMENT — LOCATION DETAILED DESCRIPTION DERM: LOCATION DETAILED: RIGHT CENTRAL ANTERIOR NECK

## 2019-06-06 NOTE — PROCEDURE: LIQUID NITROGEN
Include Z78.9 (Other Specified Conditions Influencing Health Status) As An Associated Diagnosis?: No
Detail Level: Detailed
Consent: The patient's consent was obtained including but not limited to risks of crusting, scabbing, blistering, scarring, darker or lighter pigmentary change, recurrence, incomplete removal and infection.
Medical Necessity Clause: This procedure was medically necessary because the lesions that were treated were:
Medical Necessity Information: It is in your best interest to select a reason for this procedure from the list below. All of these items fulfill various CMS LCD requirements except the new and changing color options.
Post-Care Instructions: I reviewed with the patient in detail post-care instructions. Patient is to wear sunprotection, and avoid picking at any of the treated lesions. Pt may apply Vaseline to crusted or scabbing areas.
Render Post-Care Instructions In Note?: yes

## 2019-06-06 NOTE — HPI: PROCEDURE (MOHS)
Has The Growth Been Previously Biopsied?: has been previously biopsied
Additional History: Biopsy done by Dr. Gutiérrez.

## 2019-06-09 ENCOUNTER — PATIENT MESSAGE (OUTPATIENT)
Dept: MEDICAL GROUP | Facility: MEDICAL CENTER | Age: 79
End: 2019-06-09

## 2019-06-11 ENCOUNTER — HOSPITAL ENCOUNTER (OUTPATIENT)
Dept: LAB | Facility: MEDICAL CENTER | Age: 79
End: 2019-06-11
Attending: FAMILY MEDICINE
Payer: MEDICARE

## 2019-06-11 ENCOUNTER — HOSPITAL ENCOUNTER (OUTPATIENT)
Facility: MEDICAL CENTER | Age: 79
End: 2019-06-11
Attending: INTERNAL MEDICINE
Payer: MEDICARE

## 2019-06-11 DIAGNOSIS — E78.5 DYSLIPIDEMIA: ICD-10-CM

## 2019-06-11 DIAGNOSIS — E11.40 TYPE 2 DIABETES MELLITUS WITH DIABETIC NEUROPATHY, WITHOUT LONG-TERM CURRENT USE OF INSULIN (HCC): ICD-10-CM

## 2019-06-11 DIAGNOSIS — R97.20 ELEVATED PSA, LESS THAN 10 NG/ML: ICD-10-CM

## 2019-06-11 DIAGNOSIS — M1A.0790 CHRONIC IDIOPATHIC GOUT INVOLVING TOE WITHOUT TOPHUS, UNSPECIFIED LATERALITY: ICD-10-CM

## 2019-06-11 DIAGNOSIS — I10 ESSENTIAL HYPERTENSION: ICD-10-CM

## 2019-06-11 DIAGNOSIS — E11.9 TYPE 2 DIABETES MELLITUS WITHOUT COMPLICATION, WITHOUT LONG-TERM CURRENT USE OF INSULIN (HCC): ICD-10-CM

## 2019-06-11 LAB
ALBUMIN SERPL BCP-MCNC: 3.9 G/DL (ref 3.2–4.9)
ALBUMIN/GLOB SERPL: 1.1 G/DL
ALP SERPL-CCNC: 59 U/L (ref 30–99)
ALT SERPL-CCNC: 7 U/L (ref 2–50)
ANION GAP SERPL CALC-SCNC: 13 MMOL/L (ref 0–11.9)
AST SERPL-CCNC: 10 U/L (ref 12–45)
BASOPHILS # BLD AUTO: 0.5 % (ref 0–1.8)
BASOPHILS # BLD: 0.04 K/UL (ref 0–0.12)
BILIRUB SERPL-MCNC: 0.5 MG/DL (ref 0.1–1.5)
BUN SERPL-MCNC: 28 MG/DL (ref 8–22)
CALCIUM SERPL-MCNC: 9 MG/DL (ref 8.5–10.5)
CHLORIDE SERPL-SCNC: 106 MMOL/L (ref 96–112)
CHOLEST SERPL-MCNC: 133 MG/DL (ref 100–199)
CO2 SERPL-SCNC: 24 MMOL/L (ref 20–33)
CREAT SERPL-MCNC: 1.43 MG/DL (ref 0.5–1.4)
CREAT UR-MCNC: 213.1 MG/DL
EOSINOPHIL # BLD AUTO: 0.18 K/UL (ref 0–0.51)
EOSINOPHIL NFR BLD: 2.5 % (ref 0–6.9)
ERYTHROCYTE [DISTWIDTH] IN BLOOD BY AUTOMATED COUNT: 46.8 FL (ref 35.9–50)
EST. AVERAGE GLUCOSE BLD GHB EST-MCNC: 140 MG/DL
FASTING STATUS PATIENT QL REPORTED: NORMAL
GLOBULIN SER CALC-MCNC: 3.4 G/DL (ref 1.9–3.5)
GLUCOSE SERPL-MCNC: 128 MG/DL (ref 65–99)
HBA1C MFR BLD: 6.5 % (ref 0–5.6)
HCT VFR BLD AUTO: 40.9 % (ref 42–52)
HDLC SERPL-MCNC: 31 MG/DL
HGB BLD-MCNC: 13.2 G/DL (ref 14–18)
IMM GRANULOCYTES # BLD AUTO: 0.03 K/UL (ref 0–0.11)
IMM GRANULOCYTES NFR BLD AUTO: 0.4 % (ref 0–0.9)
LDLC SERPL CALC-MCNC: 61 MG/DL
LYMPHOCYTES # BLD AUTO: 1.4 K/UL (ref 1–4.8)
LYMPHOCYTES NFR BLD: 19.1 % (ref 22–41)
MCH RBC QN AUTO: 28.8 PG (ref 27–33)
MCHC RBC AUTO-ENTMCNC: 32.3 G/DL (ref 33.7–35.3)
MCV RBC AUTO: 89.3 FL (ref 81.4–97.8)
MICROALBUMIN UR-MCNC: 18.4 MG/DL
MICROALBUMIN/CREAT UR: 86 MG/G (ref 0–30)
MONOCYTES # BLD AUTO: 0.74 K/UL (ref 0–0.85)
MONOCYTES NFR BLD AUTO: 10.1 % (ref 0–13.4)
NEUTROPHILS # BLD AUTO: 4.93 K/UL (ref 1.82–7.42)
NEUTROPHILS NFR BLD: 67.4 % (ref 44–72)
NRBC # BLD AUTO: 0 K/UL
NRBC BLD-RTO: 0 /100 WBC
PLATELET # BLD AUTO: 315 K/UL (ref 164–446)
PMV BLD AUTO: 9.4 FL (ref 9–12.9)
POTASSIUM SERPL-SCNC: 4.6 MMOL/L (ref 3.6–5.5)
PROT SERPL-MCNC: 7.3 G/DL (ref 6–8.2)
RBC # BLD AUTO: 4.58 M/UL (ref 4.7–6.1)
SODIUM SERPL-SCNC: 143 MMOL/L (ref 135–145)
TRIGL SERPL-MCNC: 207 MG/DL (ref 0–149)
URATE SERPL-MCNC: 10.6 MG/DL (ref 2.5–8.3)
WBC # BLD AUTO: 7.3 K/UL (ref 4.8–10.8)

## 2019-06-11 PROCEDURE — 82570 ASSAY OF URINE CREATININE: CPT

## 2019-06-11 PROCEDURE — 83036 HEMOGLOBIN GLYCOSYLATED A1C: CPT | Mod: GA

## 2019-06-11 PROCEDURE — 80061 LIPID PANEL: CPT

## 2019-06-11 PROCEDURE — 84550 ASSAY OF BLOOD/URIC ACID: CPT

## 2019-06-11 PROCEDURE — 82043 UR ALBUMIN QUANTITATIVE: CPT

## 2019-06-11 PROCEDURE — 85025 COMPLETE CBC W/AUTO DIFF WBC: CPT

## 2019-06-11 PROCEDURE — 36415 COLL VENOUS BLD VENIPUNCTURE: CPT | Mod: GA

## 2019-06-11 PROCEDURE — 80053 COMPREHEN METABOLIC PANEL: CPT

## 2019-06-11 PROCEDURE — 84153 ASSAY OF PSA TOTAL: CPT

## 2019-06-12 LAB — PSA SERPL-MCNC: 5.27 NG/ML (ref 0–4)

## 2019-06-24 ENCOUNTER — APPOINTMENT (RX ONLY)
Dept: URBAN - METROPOLITAN AREA CLINIC 36 | Facility: CLINIC | Age: 79
Setting detail: DERMATOLOGY
End: 2019-06-24

## 2019-06-24 DIAGNOSIS — Z48.02 ENCOUNTER FOR REMOVAL OF SUTURES: ICD-10-CM

## 2019-06-24 PROCEDURE — 99024 POSTOP FOLLOW-UP VISIT: CPT

## 2019-06-24 PROCEDURE — ? SUTURE REMOVAL (GLOBAL PERIOD)

## 2019-06-24 ASSESSMENT — LOCATION ZONE DERM: LOCATION ZONE: EAR

## 2019-06-24 ASSESSMENT — LOCATION SIMPLE DESCRIPTION DERM: LOCATION SIMPLE: RIGHT EAR

## 2019-06-24 ASSESSMENT — LOCATION DETAILED DESCRIPTION DERM: LOCATION DETAILED: RIGHT SCAPHA

## 2019-06-24 NOTE — PROCEDURE: SUTURE REMOVAL (GLOBAL PERIOD)
Detail Level: Detailed
Add 60751 Cpt? (Important Note: In 2017 The Use Of 83933 Is Being Tracked By Cms To Determine Future Global Period Reimbursement For Global Periods): yes

## 2019-06-25 ENCOUNTER — OFFICE VISIT (OUTPATIENT)
Dept: MEDICAL GROUP | Facility: MEDICAL CENTER | Age: 79
End: 2019-06-25
Payer: MEDICARE

## 2019-06-25 VITALS
OXYGEN SATURATION: 100 % | DIASTOLIC BLOOD PRESSURE: 76 MMHG | WEIGHT: 226 LBS | TEMPERATURE: 97.2 F | HEIGHT: 69 IN | HEART RATE: 76 BPM | BODY MASS INDEX: 33.47 KG/M2 | SYSTOLIC BLOOD PRESSURE: 140 MMHG

## 2019-06-25 DIAGNOSIS — R97.20 ELEVATED PSA, LESS THAN 10 NG/ML: ICD-10-CM

## 2019-06-25 DIAGNOSIS — N18.30 CKD (CHRONIC KIDNEY DISEASE) STAGE 3, GFR 30-59 ML/MIN (HCC): ICD-10-CM

## 2019-06-25 DIAGNOSIS — I10 ESSENTIAL HYPERTENSION: ICD-10-CM

## 2019-06-25 DIAGNOSIS — E11.40 TYPE 2 DIABETES, CONTROLLED, WITH NEUROPATHY (HCC): ICD-10-CM

## 2019-06-25 DIAGNOSIS — M1A.0790 CHRONIC IDIOPATHIC GOUT INVOLVING TOE WITHOUT TOPHUS, UNSPECIFIED LATERALITY: ICD-10-CM

## 2019-06-25 PROCEDURE — 99214 OFFICE O/P EST MOD 30 MIN: CPT | Performed by: FAMILY MEDICINE

## 2019-06-25 RX ORDER — ALLOPURINOL 300 MG/1
300 TABLET ORAL DAILY
Qty: 90 TAB | Refills: 3 | Status: SHIPPED | OUTPATIENT
Start: 2019-06-25

## 2019-06-25 NOTE — PROGRESS NOTES
Subjective:   Mercedez Ca is a 78 y.o. male here today for diabetes    Patient is moving to [place] in 1 month.    Chronic gout  Patient has been eating more shellfish and not watching his diet for gout.  He is complaining of increased joint pain.  Uric acid level is 10.6.  Patient has been using indomethacin on an as-needed basis.  Previously he was on allopurinol but wanted discontinued because he was not having any problems with gout, however count problems have increased.    HTN (hypertension)  Patient is tolerating atenolol 25 mg twice daily, doxazosin 2 mg daily, eplerenone 50 mg twice daily, lisinopril 40 mg daily.    Type 2 diabetes, controlled, with neuropathy  Patient is currently on Janumet  mg twice daily and pravastatin 40 mg daily as well as lisinopril 40 mg daily.    Results for MERCEDEZ CA (MRN [id number]) as of 6/25/2019 10:23   Ref. Range 3/20/2019 07:33 6/11/2019 09:23   Glycohemoglobin Latest Ref Range: 0.0 - 5.6 % 6.6 (H) 6.5 (H)       Elevated PSA, less than 10 ng/ml  Reviewed labs with patient.  Results for MERCEDEZ CA (MRN [id number]) as of 6/25/2019 10:23   Ref. Range 7/5/2017 07:31 5/11/2018 08:56 6/11/2019 09:23   Prostatic Specific Antigen Tot Latest Ref Range: 0.00 - 4.00 ng/mL 6.17 (H)  5.27 (H)   PSA Total Latest Ref Range: 0.0 - 4.0 ng/mL  5.5 (H)           Current medicines (including changes today)  Current Outpatient Prescriptions   Medication Sig Dispense Refill   • allopurinol (ZYLOPRIM) 300 MG Tab Take 1 Tab by mouth every day. 90 Tab 3   • JANUMET  MG per tablet TAKE 1 TABLET TWICE DAILY  WITH MEALS 180 Tab 3   • Eplerenone 50 MG Tab Take 1 Tab by mouth 2 Times a Day. 60 Tab 11   • atenolol (TENORMIN) 25 MG Tab Take 1 Tab by mouth 2 times a day. 180 Tab 3   • Blood Glucose Monitoring Suppl Supplies Misc Test strips order: Test strips for One Touch Ultra 2 meter. Sig: use once daily. Dx: E11.40 100 Each 3   • lisinopril (PRINIVIL, ZESTRIL)  "40 MG tablet Take 1 Tab by mouth every day. 90 Tab 3   • pravastatin (PRAVACHOL) 40 MG tablet Take 1 Tab by mouth every day. 90 Tab 3   • doxazosin (CARDURA) 2 MG Tab Take 1 Tab by mouth every day. 90 Tab 3   • indomethacin (INDOCIN) 50 MG Cap Take 1 Cap by mouth 3 times a day. 90 Cap 5   • escitalopram (LEXAPRO) 5 MG tablet Take 1 Tab by mouth every day. 30 Tab 1   • fluticasone (FLONASE) 50 MCG/ACT nasal spray SHAKE LIQUID AND USE 2 SPRAYS IN EACH NOSTRIL EVERY DAY 3 Bottle 3   • Cholecalciferol (VITAMIN D) 2000 UNITS Cap Take 2,000 Units by mouth every day.     • aspirin EC (ECOTRIN) 81 MG Tablet Delayed Response Take 81 mg by mouth every evening.     • Alpha-Lipoic Acid 600 MG CAPS Take 1 Cap by mouth every day.     • cyanocobalamin (VITAMIN B-12) 500 MCG TABS Take 500 mcg by mouth every day.     • docosahexanoic acid (FISH OIL) 1000 MG CAPS Take 1,000 mg by mouth every day.       No current facility-administered medications for this visit.      He  has a past medical history of Diabetes; Dyslipidemia; GERD (gastroesophageal reflux disease); Gout; Hypertension; Obesity; Sleep apnea; Tuberculosis; and Vertigo. He also has no past medical history of Encounter for long-term (current) use of other medications.    ROS   No chest pain, no shortness of breath       Objective:     /76 (BP Location: Right arm, Patient Position: Sitting)   Pulse 76   Temp 36.2 °C (97.2 °F)   Ht 1.753 m (5' 9\")   Wt 102.5 kg (226 lb)   SpO2 100%  Body mass index is 33.37 kg/m².   Physical Exam:  Constitutional: Alert, no distress.  Skin: Warm, dry, good turgor, no rashes in visible areas.  Eye: Equal, round and reactive, conjunctiva clear, lids normal.  Psych: Alert and oriented x3, normal affect and mood.        Assessment and Plan:   The following treatment plan was discussed    1. Type 2 diabetes, controlled, with neuropathy (HCC)  Controlled.  Continue current medication.  Follow-up every 3 months with labs.    2. 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  Will restart allopurinol 300 mg daily.  - allopurinol (ZYLOPRIM) 300 MG Tab; Take 1 Tab by mouth every day.  Dispense: 90 Tab; Refill: 3    3. CKD (chronic kidney disease) stage 3, GFR 30-59 ml/min ([facility])  Patient appears dehydrated on recent labs which probably caused a lower GFR.  Advised patient to hydrate well and repeat labs to see what his actual GFR is.  - Basic Metabolic Panel; Future    4. Essential hypertension  Controlled.  Continue current medications.    5. Elevated PSA, less than 10 ng/ml  Stable and normal for age.  Continue to monitor.      Followup: Return in about 3 months (around 9/25/2019), or if symptoms worsen or fail to improve, for Diabetes.

## 2019-06-25 NOTE — ASSESSMENT & PLAN NOTE
Patient is tolerating atenolol 25 mg twice daily, doxazosin 2 mg daily, eplerenone 50 mg twice daily, lisinopril 40 mg daily.

## 2019-06-25 NOTE — ASSESSMENT & PLAN NOTE
Patient is currently on Janumet  mg twice daily and pravastatin 40 mg daily as well as lisinopril 40 mg daily.    Results for MERCEDEZ MCDONALD (MRN [id number]) as of 6/25/2019 10:23   Ref. Range 3/20/2019 07:33 6/11/2019 09:23   Glycohemoglobin Latest Ref Range: 0.0 - 5.6 % 6.6 (H) 6.5 (H)

## 2019-06-25 NOTE — ASSESSMENT & PLAN NOTE
Reviewed labs with patient.  Results for MERCEDEZ MCDONALD (MRN [id number]) as of 6/25/2019 10:23   Ref. Range 7/5/2017 07:31 5/11/2018 08:56 6/11/2019 09:23   Prostatic Specific Antigen Tot Latest Ref Range: 0.00 - 4.00 ng/mL 6.17 (H)  5.27 (H)   PSA Total Latest Ref Range: 0.0 - 4.0 ng/mL  5.5 (H)

## 2019-06-25 NOTE — ASSESSMENT & PLAN NOTE
Patient has been eating more shellfish and not watching his diet for gout.  He is complaining of increased joint pain.  Uric acid level is 10.6.  Patient has been using indomethacin on an as-needed basis.  Previously he was on allopurinol but wanted discontinued because he was not having any problems with gout, however count problems have increased.

## 2019-07-08 ENCOUNTER — HOSPITAL ENCOUNTER (OUTPATIENT)
Dept: LAB | Facility: MEDICAL CENTER | Age: 79
End: 2019-07-08
Attending: FAMILY MEDICINE
Payer: MEDICARE

## 2019-07-08 DIAGNOSIS — N18.30 CKD (CHRONIC KIDNEY DISEASE) STAGE 3, GFR 30-59 ML/MIN (HCC): ICD-10-CM

## 2019-07-08 LAB
ANION GAP SERPL CALC-SCNC: 10 MMOL/L (ref 0–11.9)
BUN SERPL-MCNC: 18 MG/DL (ref 8–22)
CALCIUM SERPL-MCNC: 9.7 MG/DL (ref 8.5–10.5)
CHLORIDE SERPL-SCNC: 108 MMOL/L (ref 96–112)
CO2 SERPL-SCNC: 24 MMOL/L (ref 20–33)
CREAT SERPL-MCNC: 1.34 MG/DL (ref 0.5–1.4)
GLUCOSE SERPL-MCNC: 153 MG/DL (ref 65–99)
POTASSIUM SERPL-SCNC: 4.7 MMOL/L (ref 3.6–5.5)
SODIUM SERPL-SCNC: 142 MMOL/L (ref 135–145)

## 2019-07-08 PROCEDURE — 80048 BASIC METABOLIC PNL TOTAL CA: CPT

## 2019-07-08 PROCEDURE — 36415 COLL VENOUS BLD VENIPUNCTURE: CPT

## 2019-07-09 ENCOUNTER — TELEPHONE (OUTPATIENT)
Dept: MEDICAL GROUP | Facility: MEDICAL CENTER | Age: 79
End: 2019-07-09

## 2019-07-09 NOTE — TELEPHONE ENCOUNTER
----- Message from Jefferson Garcia M.D. sent at 7/9/2019  7:19 AM PDT -----  Please notify patient that kidney function has improved from 4 weeks ago.  We will need to keep an eye on his kidney function every 3-6 months when we check his diabetes levels.  Jefferson Garcia M.D.

## 2019-07-10 NOTE — PROGRESS NOTES
1) Decrease Kdur to 1 tablet in the morning  2) Start spironolactone 25 mg daily  3) Get bloodwork 1 week after starting spironolactone [facility] Progress Note  [address] [place] [address]  Dept: [phone]      Patient Name: Primo Ca   MRN: [id number]  YOB: 1940  PCP: Jefferson Garcia M.D.  Date of Service: 6/14/2017 10:00 AM    CC Alvarado - SANDY  peripheral neuropathy, OA wrists R>L (lateral aspect), shoulder pain L      HPI L anterior shoulder is painful but Heat and ice both help the shoulder    R lateral wrist painful as well -     Bilateral foot neuropathy stable, but he'd like work on this.     Has the granddaughter for the week.        ROS     walks an hour 4x per week-   Seen by chiropractic in the past - states that this didn't help.   Favorite color - Red - Dark.  Just likes it. Wears brown and light colored clothes.    Favorite Season - likes the color of the fall .  Too hot in the summer.  90s is too hot.    For hobby / fun - travel.  In the states and overseas.  Likes to meet people, but really likes the history. Genealogy.   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    Retired at 55.    Didn't like the management.    4 years with the [employer].    Used to work in [place], [place]  Born in [place], [place] ? Early in the 2 AM.  Unknown.   R handed   PMH Past Medical History   Diagnosis Date   • Hypertension    • Dyslipidemia    • Gout    • Vertigo    • Obesity    • Diabetes    • GERD (gastroesophageal reflux disease)      Past Surgical History   Procedure Laterality Date   • Cholecystectomy         Social History     Social History   • Marital Status:      Spouse Name: Evelyn   • Number of Children: N/A   • Years of Education: N/A     Social History Main Topics   • Smoking status: Former Smoker -- 22 years     Types: Pipe     Start date: 04/01/1975     Quit date: 08/02/1981   • Smokeless tobacco: Never Used   • Alcohol Use: No   • Drug Use: No   • Sexual Activity: No      Comment: , 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 90 Cap 3   • indomethacin (INDOCIN) 50 MG Cap Take 50 mg by mouth 3 times a day.     • diazepam (VALIUM) 2 MG Tab Take 1 Tab by mouth 1 time daily as needed for Anxiety. 30 Tab 1   • fluticasone (FLONASE) 50 MCG/ACT nasal spray Spray 2 Sprays in nose every day. 16 g 3   • Cholecalciferol (VITAMIN D) 2000 UNITS Cap Take 2,000 Units by mouth every day.     • aspirin EC (ECOTRIN) 81 MG Tablet Delayed Response Take 81 mg by mouth every evening.     • atenolol (TENORMIN) 50 MG Tab TAKE 1 TABLET BY MOUTH TWICE DAILY 180 Tab 3   • lovastatin (MEVACOR) 20 MG Tab TAKE 3 TABLETS BY MOUTH EVERY  Tab 3   • hydrochlorothiazide (HYDRODIURIL) 25 MG Tab TAKE 1 TABLET BY MOUTH EVERY DAY 90 Tab 3   • Alpha-Lipoic Acid 600 MG CAPS Take 1 Cap by mouth every day.     • cyanocobalamin (VITAMIN B-12) 500 MCG TABS Take 500 mcg by mouth every day.     • docosahexanoic acid (FISH OIL) 1000 MG CAPS Take 1,000 mg by mouth every day.       No current facility-administered medications on file prior to visit.      ALLERGIES Allergies   Allergen Reactions   • Spironolactone      diarrhea      PE Pulses - not examined today  Tongue - not examined today     Assesment Eastern Stagnation YAMILA/ SI, Yin Qi  BaZi [place] Water ([place]), strength indeterminate (strong per preferences) need time of birth    Western Encounter Diagnoses   Name Primary?   • Chronic left shoulder pain Yes   • Primary osteoarthritis of both wrists    • Idiopathic peripheral neuropathy                   Plan Treatment matrix = Alternating  Set 1: Bon FMS  Set 2: LUE LU7-->LU2  Set 3: RUE  Sj5-->Sj4 / Si7-->SI5  Set 4: RLE Yin Neuropathy Tx without Electricity  Set 5: LLE Richard Neuropathy Tx without electricity  Set 5: Ba Kai bilaterally     >15 min spent face to face, not including procedure.  >50% of the face to face time was spent in counseling and coordination. Topics discussed included:  Patholgical process of Peripheral neuropathy to the feet, time frame for healing of peripheral neuropathy, and reasons for the extended duration time of healing. Role of acupuncture in treating osteoarthrosis of the wrist.  Discussed patient's political views.   Total acupuncture treatment time = 45 minutes    Kal Huizar M.D.

## 2019-07-15 ENCOUNTER — PATIENT MESSAGE (OUTPATIENT)
Dept: MEDICAL GROUP | Facility: MEDICAL CENTER | Age: 79
End: 2019-07-15

## 2019-07-15 RX ORDER — PRAVASTATIN SODIUM 40 MG
40 TABLET ORAL DAILY
Qty: 90 TAB | Refills: 3 | Status: SHIPPED | OUTPATIENT
Start: 2019-07-15 | End: 2019-08-16 | Stop reason: SDUPTHER

## 2019-07-15 RX ORDER — EPLERENONE 50 MG/1
50 TABLET, FILM COATED ORAL 2 TIMES DAILY
Qty: 180 TAB | Refills: 3 | Status: SHIPPED | OUTPATIENT
Start: 2019-07-15 | End: 2019-08-16 | Stop reason: SDUPTHER

## 2019-07-15 RX ORDER — LISINOPRIL 40 MG/1
40 TABLET ORAL DAILY
Qty: 90 TAB | Refills: 3 | Status: SHIPPED | OUTPATIENT
Start: 2019-07-15

## 2019-07-15 RX ORDER — DOXAZOSIN 2 MG/1
2 TABLET ORAL DAILY
Qty: 90 TAB | Refills: 3 | Status: SHIPPED | OUTPATIENT
Start: 2019-07-15 | End: 2019-08-16 | Stop reason: SDUPTHER

## 2019-07-15 NOTE — TELEPHONE ENCOUNTER
----- Message from Primo Ca sent at 7/15/2019 12:48 PM PDT -----  Regarding: Prescription Question  Contact: [phone]  Moving 25[facility].  Just checked and 90 day refills of meds are covered except (no refills remain) for Lisinopril 40 that I  take in AM, Provistatin 40 that I take at night, Eplerenone 50 that I take in AM, and Doxazosin 2 that I take in PM (and if pressure might spike,  which it has not I year).  These 4 meds have been in use since seeing Dr Bloch and there are no refills left.  Can you get me 2 more refills at [facility].  I will be using [facility] in [place] and have records transferred between [facility].  Can you order 2 more refills for each, or am I going to have to call Dr Bloch who is not on my chart.  Also, I will have blood work done in 3 months to look at kidney function, and hopefully find a Doc as good as you to guide me thru old age.  Thanks.

## 2019-08-16 RX ORDER — DOXAZOSIN 2 MG/1
2 TABLET ORAL DAILY
Qty: 90 TAB | Refills: 3 | Status: SHIPPED | OUTPATIENT
Start: 2019-08-16

## 2019-08-16 RX ORDER — PRAVASTATIN SODIUM 40 MG
40 TABLET ORAL DAILY
Qty: 90 TAB | Refills: 3 | Status: SHIPPED | OUTPATIENT
Start: 2019-08-16 | End: 2020-09-28

## 2019-08-16 RX ORDER — EPLERENONE 50 MG/1
50 TABLET, FILM COATED ORAL 2 TIMES DAILY
Qty: 180 TAB | Refills: 3 | Status: SHIPPED | OUTPATIENT
Start: 2019-08-16

## 2019-08-16 NOTE — TELEPHONE ENCOUNTER
----- Message from Primo Ca sent at 8/15/2019  1:42 PM PDT -----  Regarding: Non-Urgent Medical Question  Contact: [phone]  I am being computer sacked.  Did you get the message I typed you 3 times, only to have it disappears just before sending.  Can you have your assistant call me [phone].  Please tell me they were received by some [employer] miricle.  I need refills you sent to [facility] about 7 weeks ago (they won't transfer them because I never used them) called into [facility] pharmacy here in [place], [place] at [phone].  Eplerenone 50mg, Provastatin 40mg, and Doxazosin 2mg.  They  transferred 3 other prescriptions.   Thanks

## 2019-09-09 RX ORDER — INDOMETHACIN 50 MG/1
50 CAPSULE ORAL 3 TIMES DAILY
Qty: 90 CAP | Refills: 2 | Status: SHIPPED | OUTPATIENT
Start: 2019-09-09

## 2019-11-12 DIAGNOSIS — I10 ESSENTIAL HYPERTENSION: ICD-10-CM

## 2019-11-12 RX ORDER — ATENOLOL 25 MG/1
TABLET ORAL
Qty: 180 TAB | Refills: 3 | Status: SHIPPED | OUTPATIENT
Start: 2019-11-12 | End: 2020-03-14

## 2019-12-18 ENCOUNTER — TELEPHONE (OUTPATIENT)
Dept: VASCULAR LAB | Facility: MEDICAL CENTER | Age: 79
End: 2019-12-18

## 2019-12-19 NOTE — TELEPHONE ENCOUNTER
Unable to lvm / call goes straight to a busy tone / attempted multiple to make multiple times. Schedule next avail f/u with APRN

## 2020-01-30 ENCOUNTER — TELEPHONE (OUTPATIENT)
Dept: VASCULAR LAB | Facility: MEDICAL CENTER | Age: 80
End: 2020-01-30

## 2020-03-14 DIAGNOSIS — I10 ESSENTIAL HYPERTENSION: ICD-10-CM

## 2020-03-14 RX ORDER — ATENOLOL 25 MG/1
TABLET ORAL
Qty: 180 TAB | Refills: 1 | Status: SHIPPED | OUTPATIENT
Start: 2020-03-14

## 2020-04-07 ENCOUNTER — TELEPHONE (OUTPATIENT)
Dept: VASCULAR LAB | Facility: MEDICAL CENTER | Age: 80
End: 2020-04-07

## 2020-04-07 NOTE — TELEPHONE ENCOUNTER
Patient overdue for follow-up appt with vascular care.  Medical assistant has been unable to reach and reschedule  Multiple messages have been left  Await further patient contact.  Pending further contact, will defer all vascular care, including management of cardiac vascular risk factors, to PCP    Michael J. Bloch, MD  Vascular Care    Cc:  CHAD Garcia

## 2021-01-14 DIAGNOSIS — Z23 NEED FOR VACCINATION: ICD-10-CM

## 2022-11-09 NOTE — ED AVS SNAPSHOT
[employer] Access Code: Activation code not generated  Current [employer] Status: Active    [employer]  A secure, online tool to manage your health information     [employer]’s [employer]® is a secure, online tool that connects you to your personalized health information from the privacy of your home -- day or night - making it very easy for you to manage your healthcare. Once the activation process is completed, you can even access your medical information using the [employer] bradley, which is available for free in the Apple Bradley store or Google Play store.     [employer] provides the following levels of access (as shown below):   My Chart Features   [facility] Primary Care Doctor [facility]  Specialists [facility]  Urgent  Care Non-[facility]  Primary Care  Doctor   Email your healthcare team securely and privately 24/7 X X X X   Manage appointments: schedule your next appointment; view details of past/upcoming appointments X      Request prescription refills. X      View recent personal medical records, including lab and immunizations X X X X   View health record, including health history, allergies, medications X X X X   Read reports about your outpatient visits, procedures, consult and ER notes X X X X   See your discharge summary, which is a recap of your hospital and/or ER visit that includes your diagnosis, lab results, and care plan. X X       How to register for [employer]:  1. Go to  [email or web address].  2. Click on the Sign Up Now box, which takes you to the New Member Sign Up page. You will need to provide the following information:  a. Enter your [employer] Access Code exactly as it appears at the top of this page. (You will not need to use this code after you’ve completed the sign-up process. If you do not sign up before the expiration date, you must request a new code.)   b. Enter your date of birth.   c. Enter your home email address.   d. Click Submit, and follow the next screen’s instructions.  3. Create a [employer] ID. This will  be your [employer] login ID and cannot be changed, so think of one that is secure and easy to remember.  4. Create a [employer] password. You can change your password at any time.  5. Enter your Password Reset Question and Answer. This can be used at a later time if you forget your password.   6. Enter your e-mail address. This allows you to receive e-mail notifications when new information is available in [employer].  7. Click Sign Up. You can now view your health information.    For assistance activating your [employer] account, call [phone]         dizziness